# Patient Record
Sex: MALE | Race: BLACK OR AFRICAN AMERICAN | NOT HISPANIC OR LATINO | Employment: OTHER | URBAN - METROPOLITAN AREA
[De-identification: names, ages, dates, MRNs, and addresses within clinical notes are randomized per-mention and may not be internally consistent; named-entity substitution may affect disease eponyms.]

---

## 2017-03-22 ENCOUNTER — HISTORICAL (OUTPATIENT)
Dept: INTERNAL MEDICINE | Facility: CLINIC | Age: 62
End: 2017-03-22

## 2017-04-20 ENCOUNTER — HISTORICAL (OUTPATIENT)
Dept: CARDIOLOGY | Facility: CLINIC | Age: 62
End: 2017-04-20

## 2017-07-21 ENCOUNTER — HISTORICAL (OUTPATIENT)
Dept: CARDIOLOGY | Facility: CLINIC | Age: 62
End: 2017-07-21

## 2018-01-10 ENCOUNTER — HISTORICAL (OUTPATIENT)
Dept: CARDIOLOGY | Facility: CLINIC | Age: 63
End: 2018-01-10

## 2018-04-12 ENCOUNTER — HISTORICAL (OUTPATIENT)
Dept: INTERNAL MEDICINE | Facility: CLINIC | Age: 63
End: 2018-04-12

## 2018-04-12 LAB
ALBUMIN SERPL-MCNC: 3.6 GM/DL (ref 3.4–5)
ALBUMIN/GLOB SERPL: 1 RATIO (ref 1–2)
ALP SERPL-CCNC: 150 UNIT/L (ref 45–117)
ALT SERPL-CCNC: 72 UNIT/L (ref 12–78)
AST SERPL-CCNC: 28 UNIT/L (ref 15–37)
BILIRUB SERPL-MCNC: 0.7 MG/DL (ref 0.2–1)
BILIRUBIN DIRECT+TOT PNL SERPL-MCNC: 0.2 MG/DL
BILIRUBIN DIRECT+TOT PNL SERPL-MCNC: 0.5 MG/DL
BUN SERPL-MCNC: 17 MG/DL (ref 7–18)
CALCIUM SERPL-MCNC: 9.5 MG/DL (ref 8.5–10.1)
CHLORIDE SERPL-SCNC: 100 MMOL/L (ref 98–107)
CO2 SERPL-SCNC: 26 MMOL/L (ref 21–32)
CREAT SERPL-MCNC: 1.1 MG/DL (ref 0.6–1.3)
GLOBULIN SER-MCNC: 4.9 GM/ML (ref 2.3–3.5)
GLUCOSE SERPL-MCNC: 330 MG/DL (ref 74–106)
POTASSIUM SERPL-SCNC: 4 MMOL/L (ref 3.5–5.1)
PROT SERPL-MCNC: 8.5 GM/DL (ref 6.4–8.2)
SODIUM SERPL-SCNC: 135 MMOL/L (ref 136–145)

## 2018-05-21 ENCOUNTER — HISTORICAL (OUTPATIENT)
Dept: ADMINISTRATIVE | Facility: HOSPITAL | Age: 63
End: 2018-05-21

## 2018-06-12 ENCOUNTER — HISTORICAL (OUTPATIENT)
Dept: ADMINISTRATIVE | Facility: HOSPITAL | Age: 63
End: 2018-06-12

## 2018-06-25 ENCOUNTER — HISTORICAL (OUTPATIENT)
Dept: ADMINISTRATIVE | Facility: HOSPITAL | Age: 63
End: 2018-06-25

## 2018-06-25 LAB
ALBUMIN SERPL-MCNC: 3.7 GM/DL (ref 3.4–5)
ALBUMIN/GLOB SERPL: 1 RATIO (ref 1–2)
ALP SERPL-CCNC: 140 UNIT/L (ref 45–117)
ALT SERPL-CCNC: 75 UNIT/L (ref 12–78)
APTT PPP: 27.2 SECOND(S) (ref 23.3–37)
AST SERPL-CCNC: 44 UNIT/L (ref 15–37)
BILIRUB SERPL-MCNC: 1 MG/DL (ref 0.2–1)
BILIRUBIN DIRECT+TOT PNL SERPL-MCNC: 0.3 MG/DL
BILIRUBIN DIRECT+TOT PNL SERPL-MCNC: 0.7 MG/DL
BUN SERPL-MCNC: 20 MG/DL (ref 7–18)
CALCIUM SERPL-MCNC: 10 MG/DL (ref 8.5–10.1)
CHLORIDE SERPL-SCNC: 100 MMOL/L (ref 98–107)
CO2 SERPL-SCNC: 24 MMOL/L (ref 21–32)
CREAT SERPL-MCNC: 1.1 MG/DL (ref 0.6–1.3)
ERYTHROCYTE [DISTWIDTH] IN BLOOD BY AUTOMATED COUNT: 13.7 % (ref 11.5–14.5)
EST. AVERAGE GLUCOSE BLD GHB EST-MCNC: 295 MG/DL
GLOBULIN SER-MCNC: 4.7 GM/ML (ref 2.3–3.5)
GLUCOSE SERPL-MCNC: 309 MG/DL (ref 74–106)
HBA1C MFR BLD: 11.9 % (ref 4.2–6.3)
HCT VFR BLD AUTO: 47.7 % (ref 40–51)
HGB BLD-MCNC: 17.1 GM/DL (ref 13.5–17.5)
INR PPP: 1.07 (ref 0.9–1.2)
MCH RBC QN AUTO: 30.6 PG (ref 26–34)
MCHC RBC AUTO-ENTMCNC: 35.8 GM/DL (ref 31–37)
MCV RBC AUTO: 85.5 FL (ref 80–100)
PLATELET # BLD AUTO: 224 X10(3)/MCL (ref 130–400)
PMV BLD AUTO: 11.1 FL (ref 7.4–10.4)
POTASSIUM SERPL-SCNC: 4.3 MMOL/L (ref 3.5–5.1)
PROT SERPL-MCNC: 8.4 GM/DL (ref 6.4–8.2)
PROTHROMBIN TIME: 13.2 SECOND(S) (ref 11.9–14.4)
RBC # BLD AUTO: 5.58 X10(6)/MCL (ref 4.5–5.9)
SODIUM SERPL-SCNC: 133 MMOL/L (ref 136–145)
WBC # SPEC AUTO: 10.7 X10(3)/MCL (ref 4.5–11)

## 2018-06-26 ENCOUNTER — HISTORICAL (OUTPATIENT)
Dept: RADIOLOGY | Facility: HOSPITAL | Age: 63
End: 2018-06-26

## 2018-06-26 LAB
BUN SERPL-MCNC: 20 MG/DL (ref 7–18)
CREAT SERPL-MCNC: 1.2 MG/DL (ref 0.6–1.3)
EST. AVERAGE GLUCOSE BLD GHB EST-MCNC: 301 MG/DL
HBA1C MFR BLD: 12.1 % (ref 4.2–6.3)

## 2018-07-02 ENCOUNTER — HISTORICAL (OUTPATIENT)
Dept: SURGERY | Facility: HOSPITAL | Age: 63
End: 2018-07-02

## 2018-07-02 LAB — POTASSIUM SERPL-SCNC: 4 MMOL/L (ref 3.5–5.1)

## 2018-09-13 ENCOUNTER — HISTORICAL (OUTPATIENT)
Dept: INTERNAL MEDICINE | Facility: CLINIC | Age: 63
End: 2018-09-13

## 2018-09-22 ENCOUNTER — HISTORICAL (OUTPATIENT)
Dept: INTERNAL MEDICINE | Facility: CLINIC | Age: 63
End: 2018-09-22

## 2018-10-01 ENCOUNTER — HISTORICAL (OUTPATIENT)
Dept: INTERNAL MEDICINE | Facility: CLINIC | Age: 63
End: 2018-10-01

## 2018-10-31 ENCOUNTER — HISTORICAL (OUTPATIENT)
Dept: RADIOLOGY | Facility: HOSPITAL | Age: 63
End: 2018-10-31

## 2018-11-08 ENCOUNTER — HISTORICAL (OUTPATIENT)
Dept: RADIOLOGY | Facility: HOSPITAL | Age: 63
End: 2018-11-08

## 2018-11-23 ENCOUNTER — HISTORICAL (OUTPATIENT)
Dept: RADIOLOGY | Facility: HOSPITAL | Age: 63
End: 2018-11-23

## 2018-12-06 ENCOUNTER — HISTORICAL (OUTPATIENT)
Dept: RADIOLOGY | Facility: HOSPITAL | Age: 63
End: 2018-12-06

## 2019-01-11 ENCOUNTER — HISTORICAL (OUTPATIENT)
Dept: RADIOLOGY | Facility: HOSPITAL | Age: 64
End: 2019-01-11

## 2019-01-16 ENCOUNTER — HISTORICAL (OUTPATIENT)
Dept: RADIOLOGY | Facility: HOSPITAL | Age: 64
End: 2019-01-16

## 2019-04-25 ENCOUNTER — HISTORICAL (OUTPATIENT)
Dept: INTERNAL MEDICINE | Facility: CLINIC | Age: 64
End: 2019-04-25

## 2019-06-25 ENCOUNTER — HISTORICAL (OUTPATIENT)
Dept: ADMINISTRATIVE | Facility: HOSPITAL | Age: 64
End: 2019-06-25

## 2019-08-19 ENCOUNTER — HISTORICAL (OUTPATIENT)
Dept: RADIOLOGY | Facility: HOSPITAL | Age: 64
End: 2019-08-19

## 2019-08-19 ENCOUNTER — HISTORICAL (OUTPATIENT)
Dept: ADMINISTRATIVE | Facility: HOSPITAL | Age: 64
End: 2019-08-19

## 2019-10-22 ENCOUNTER — HISTORICAL (OUTPATIENT)
Dept: INTERNAL MEDICINE | Facility: CLINIC | Age: 64
End: 2019-10-22

## 2019-10-22 LAB — PSA: 0.6

## 2019-11-15 ENCOUNTER — HISTORICAL (OUTPATIENT)
Dept: INTERNAL MEDICINE | Facility: CLINIC | Age: 64
End: 2019-11-15

## 2020-01-24 ENCOUNTER — HISTORICAL (OUTPATIENT)
Dept: ADMINISTRATIVE | Facility: HOSPITAL | Age: 65
End: 2020-01-24

## 2020-04-27 ENCOUNTER — HISTORICAL (OUTPATIENT)
Dept: INTERNAL MEDICINE | Facility: CLINIC | Age: 65
End: 2020-04-27

## 2020-05-01 ENCOUNTER — HISTORICAL (OUTPATIENT)
Dept: INTERNAL MEDICINE | Facility: CLINIC | Age: 65
End: 2020-05-01

## 2020-09-02 ENCOUNTER — HISTORICAL (OUTPATIENT)
Dept: INTERNAL MEDICINE | Facility: CLINIC | Age: 65
End: 2020-09-02

## 2020-09-02 LAB — HIV 1+2 AB+HIV1 P24 AG SERPL QL IA: NONREACTIVE

## 2020-10-12 ENCOUNTER — HISTORICAL (OUTPATIENT)
Dept: INTERNAL MEDICINE | Facility: CLINIC | Age: 65
End: 2020-10-12

## 2020-11-11 ENCOUNTER — HISTORICAL (OUTPATIENT)
Dept: INTERNAL MEDICINE | Facility: CLINIC | Age: 65
End: 2020-11-11

## 2020-11-12 ENCOUNTER — HISTORICAL (OUTPATIENT)
Dept: ADMINISTRATIVE | Facility: HOSPITAL | Age: 65
End: 2020-11-12

## 2020-11-25 ENCOUNTER — HISTORICAL (OUTPATIENT)
Dept: RESPIRATORY THERAPY | Facility: HOSPITAL | Age: 65
End: 2020-11-25

## 2020-12-23 ENCOUNTER — HISTORICAL (OUTPATIENT)
Dept: INTERNAL MEDICINE | Facility: CLINIC | Age: 65
End: 2020-12-23

## 2021-02-01 ENCOUNTER — HISTORICAL (OUTPATIENT)
Dept: INTERNAL MEDICINE | Facility: CLINIC | Age: 66
End: 2021-02-01

## 2021-03-17 ENCOUNTER — HISTORICAL (OUTPATIENT)
Dept: INTERNAL MEDICINE | Facility: CLINIC | Age: 66
End: 2021-03-17

## 2021-06-15 ENCOUNTER — HISTORICAL (OUTPATIENT)
Dept: ADMINISTRATIVE | Facility: HOSPITAL | Age: 66
End: 2021-06-15

## 2021-10-18 ENCOUNTER — HISTORICAL (OUTPATIENT)
Dept: INTERNAL MEDICINE | Facility: CLINIC | Age: 66
End: 2021-10-18

## 2021-10-18 LAB — HCV AB SERPL QL IA: NEGATIVE

## 2021-10-29 ENCOUNTER — HISTORICAL (OUTPATIENT)
Dept: ADMINISTRATIVE | Facility: HOSPITAL | Age: 66
End: 2021-10-29

## 2021-11-02 LAB — HEMOCCULT STL QL IA: NEGATIVE

## 2022-01-03 ENCOUNTER — HISTORICAL (OUTPATIENT)
Dept: GASTROENTEROLOGY | Facility: CLINIC | Age: 67
End: 2022-01-03

## 2022-04-07 ENCOUNTER — HISTORICAL (OUTPATIENT)
Dept: ADMINISTRATIVE | Facility: HOSPITAL | Age: 67
End: 2022-04-07
Payer: MEDICARE

## 2022-04-24 VITALS
HEIGHT: 70 IN | SYSTOLIC BLOOD PRESSURE: 131 MMHG | BODY MASS INDEX: 37.53 KG/M2 | DIASTOLIC BLOOD PRESSURE: 77 MMHG | WEIGHT: 262.13 LBS | OXYGEN SATURATION: 98 %

## 2022-04-26 DIAGNOSIS — K76.0 HEPATIC STEATOSIS: Primary | ICD-10-CM

## 2022-05-02 NOTE — HISTORICAL [facility] CERNER
This is a historical note converted from [employer]. Formatting and pictures may have been removed.  Please reference [employer] for original formatting and attached multimedia. Chief Complaint  referred for hearing loss  History of Present Illness  63yM referred for hearing loss, also has issues with cerumen.  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  Hearing improves on the right with auto-insufflation  He is a current smoker. No neck masses  ?   7/2/18: Pt here for panendoscopy. No changes to health.? Has cardiology clearance.  ?   Review of Systems  Constitutional_negative  Eye_no vision changes  ENMT see HPI  Respiratory negative  Cardiovascular negative  Gastrointestinal negative  Hema/Lymph_negative  Endocrine negative  Immunologic negative  Musculoskeletal negative  Integumentary negative  Neurologic negative  All Other ROS_ negative  Physical Exam  ???Vitals & Measurements  ??T:?36.9? ?C (Oral)? HR:?78(Peripheral)? RR:?20? BP:?123/85? SpO2:?98%?  ??HT:?175.26?cm? WT:?112.4?kg?  General_NAD, normocephalic and atraumatic, normal voice  Eye_EOMI, PERRL  Ear Right TM With ? effusionl, Left TM normal, normal pinnas and EACs  Nose - Inferior turbinates normal, septum midline, mucosa moist  Oral cavity - Tongue normal, no mucosal lesions, good dentition  Oropharynx - no lesions noted  Neck- no lymphadenopathy, no thyromegaly  Respiratory- no increased work of breathing  Integumentary- no rashes, no skin lesions  Neurologic- CN II-XII intact  ?   Audio: Bilateral CHL, TYye As tymp on the right, no tymp done on the left  ?   Laryngoscopy - The patient was anesthetized with topical tetracaine and neosynephrine. The laryngoscope was passed through the nostril easily. The Nasopharynx was normal. The Base of tongue was normal. The supraglottis was normal. The piriforms were normal. The vocal cords were mobile bilaterally with no mucosal lesions. the tonsils are both large, however the left is  significantly larger than the right and extends to the level of the epiglottis. Airway is widely patent  Assessment/Plan  1.?Hearing loss  ??? ? effussion on the right?  ??CT IAC  Flonase BID  ?  2.?Impacted cerumen  ??? Cleared today  ?  FFL-concerning Left tonsil lesion, firm. Extends to the level of the epiglottis but non-obstructive, airway is widely patent  ?  CT neck with contrast-done  unable to hold plavix per cardiology due to drug eluting stent  To OR July 2nd for panendoscopy.  No changes to above H&P  RTC after surgery  ?  ? Problem List/Past Medical History  ??Ongoing  ??Anemia  ??CAD (coronary artery disease)  ??Diabetes  ??HLD (hyperlipidemia)  ??HTN (hypertension)  ??Syncope  ??Tobacco user  ??Tobacco user  ??Tobacco user  ?Historical  ??Acid reflux  Procedure/Surgical History  Dilation of Coronary Artery, One Artery with Drug-eluting Intraluminal Device, Percutaneous Approach (09/20/2017), Fluoroscopy of Left Heart using Low Osmolar Contrast (09/20/2017), Fluoroscopy of Multiple Coronary Arteries using Low Osmolar Contrast (09/20/2017), Measurement of Cardiac Sampling and Pressure, Left Heart, Percutaneous Approach (09/20/2017), Dilation of Coronary Artery, One Site with Drug-eluting Intraluminal Device, Percutaneous Approach (02/05/2016), Fluoroscopy of Left Heart using Low Osmolar Contrast (02/05/2016), Fluoroscopy of Multiple Coronary Arteries using Low Osmolar Contrast (02/05/2016), Measurement of Cardiac Sampling and Pressure, Left Heart, Percutaneous Approach (02/05/2016), Insertion of coronary artery stent, Insertion of coronary artery stent.  Medications  ??aspirin 81 mg oral Delayed Release (EC) tablet, 81 mg= 1 tab(s), Oral, Daily, 3 refills  ??atorvastatin 80 mg oral tablet, 80 mg= 1 tab(s), Oral, Daily, 2 refills  ??clopidogrel 75 mg oral tablet, 75 mg= 1 tab(s), Oral, Daily, 3 refills  ??glipiZIDE 10 mg oral tablet, 10 mg= 1 tab(s), Oral, BID, 6 refills  ??Glucometer, See Instructions,  1 refills  ??Glucometer Test Strips and Lancets, See Instructions, 3 refills  ??lisinopril 10 mg oral tablet, 10 mg= 1 tab(s), Oral, Daily, 2 refills  ??metFORMIN 1000 mg oral tablet, 1000 mg= 1 tab(s), Oral, BID, 6 refills  ??metoprolol tartrate 50 mg oral tab, 50 mg= 1 tab(s), Oral, BID, 2 refills  ??nitroglycerin 0.4 mg sublingual TAB, 0.4 mg= 1 tab(s), SL, q5min, PRN  ??omeprazole 40 mg oral DR capsule, 40 mg= 1 cap(s), Oral, Daily, 6 refills  ??ONE TOUCH ULTRA BLUE VERIO, See Instructions, 3 refills  ??ONE TOUCH ULTRA BLUE VERIO, See Instructions  Allergies  No Known Medication Allergies  Social History  ??Alcohol - Medium Risk, 02/04/2016  ???Current, Beer, 1-2 times per month, 06/12/2018  ???Current, Beer, 1-2 times per week, 02/04/2016  ??Employment/School  ???Retired, Operates hazardous equipment: No., 06/21/2016  ??Exercise  ???Self assessment: Fair condition., 06/21/2016  ??Home/Environment  ???Lives with Children. Living situation: Home/Independent. Alcohol abuse in household: No. Substance abuse in household: No. Smoker in household: Yes. Injuries/Abuse/Neglect in household: No. Feels unsafe at home: No. Family/Friends available for support: Yes. Concern for family members at home: No. Major illness in household: No. Financial concerns: No. TV/Computer concerns: No., 06/21/2016  ??Nutrition/Health  ???Regular, 03/01/2016  ??Substance Abuse - Denies Substance Abuse, 02/04/2016  ???Past, 03/01/2016  ??Tobacco - High Risk, 02/04/2016  ???Current every day smoker Use:. Cigarettes Type:. 15 per day. Started age 6 Years., 06/12/2018  ???Current every day smoker, 10 per day., 08/21/2017  ???Current every day smoker, Cigarettes, 03/20/2017  Family History  ??Heart failure.: Mother.  ??Hypertension.: Mother.  ??Renal failure syndrome.: Sister.  Immunizations  ?Vaccine ?Date ?Status   ?influenza virus vaccine, inactivated 12/01/2016 Given   ?pneumococcal 23-polyvalent vaccine 02/06/2016 Given   ?influenza virus  vaccine, inactivated 02/06/2016 Given   ?  Health Maintenance  Health Maintenance  ???Pending?(in the next year)  ??? ??OverDue  ??? ? ? ?Diabetes Maintenance-Urine Dipstick due??08/30/17??and every 1??year(s)  ??? ? ? ?Diabetes Maintenance-HgbA1c due??09/22/17??and every 6??month(s)  ??? ??Due?  ??? ? ? ?Alcohol Misuse Screening due??06/12/18??and every 1??year(s)  ??? ? ? ?Colorectal Screening due??06/12/18??Variable frequency  ??? ? ? ?Diabetes Maintenance-Eye Exam due??06/12/18??Variable frequency  ??? ? ? ?Diabetes Maintenance-Microalbumin due??06/12/18??Variable frequency  ??? ? ? ?Diabetes Maintenance-Foot Exam due??06/12/18??Variable frequency  ??? ? ? ?Hypertension Management-Education due??06/12/18??and every 1??year(s)  ??? ? ? ?Tetanus Vaccine due??06/12/18??and every 10??year(s)  ??? ? ? ?Zoster Vaccine due??06/12/18??and every 100??year(s)  ??? ??Due In Future?  ??? ? ? ?Influenza Vaccine not due until??10/02/18??and every 1??year(s)  ??? ? ? ?Hypertension Management-Blood Pressure not due until??12/12/18??and every 6??month(s)  ??? ? ? ?Diabetes Maintenance-Fasting Lipid Profile not due until??01/10/19??and every 1??year(s)  ??? ? ? ?Lipid Screening not due until??01/10/19??and every 1??year(s)  ??? ? ? ?Diabetes Maintenance-Serum Creatinine not due until??04/12/19??and every 1??year(s)  ??? ? ? ?Hypertension Management-BMP not due until??04/12/19??and every 1??year(s)  ??? ? ? ?Body Mass Index Check not due until??04/12/19??and every 1??year(s)  ??? ? ? ?Obesity Screening not due until??04/12/19??and every 1??year(s)  ??? ? ? ?Aspirin Therapy for CVD Prevention not due until??04/12/19??and every 1??year(s)  ???Satisfied?(in the past 1 year)  ??? ??Satisfied?  ??? ? ? ?Aspirin Therapy for CVD Prevention on??04/12/18.??Satisfied by Imtiaz Solano DO  ??? ? ? ?Blood Pressure Screening on??06/12/18.??Satisfied by Shayla Goldstein LPN  ??? ? ? ?Body Mass Index Check on??04/12/18.??Satisfied by  Cammy Freeman RN  ??? ? ? ?Depression Screening on??06/12/18.??Satisfied by Shayla Goldstein LPN  ??? ? ? ?Diabetes Maintenance-Fasting Lipid Profile on??01/10/18.??Satisfied by Sree Swanson Jr.  ??? ? ? ?Diabetes Maintenance-HgbA1c on??09/21/17.??Satisfied by Dora Judd  ??? ? ? ?Diabetes Maintenance-Serum Creatinine on??04/12/18.??Satisfied by Hannah Hopson  ??? ? ? ?Hypertension Management-BMP on??04/12/18.??Satisfied by Hannah Hopson  ??? ? ? ?Hypertension Management-Blood Pressure on??06/12/18.??Satisfied by Shayla Goldstein LPN  ??? ? ? ?Lipid Screening on??01/10/18.??Satisfied by Sree Swanson Jr.  ??? ? ? ?Obesity Screening on??04/12/18.??Satisfied by aCmmy Freeman RN  ??? ? ? ?Smoking Cessation on??06/12/18.??Satisfied by Shayla Goldstein LPN  ??? ? ? ?Tobacco Use Screening on??06/12/18.??Satisfied by Shayla Goldstein LPN  ?

## 2022-05-02 NOTE — HISTORICAL [facility] CERNER
This is a historical note converted from [employer]. Formatting and pictures may have been removed.  Please reference [employer] for original formatting and attached multimedia. Indication for Surgery  63yM with asymmetrical tonsils on exam L>R and CT with concern for tonsillar mass and a smoking history.? Pt also has a significant cardiac history and obtained cardiac clearance before the procedure.? However, due to recent drug eluting stent placement pt was unable to hold plavix.? This was discussed with patient and due to this plavix usage?a tonsillectomy would not be safe to perform at this time but a biopsy of the tonsil would still be done.? We also discussed this may not yield the results we need and could complete a tonsillectomy once plavix could be stopped.  Preoperative Diagnosis  tonsillar mass  Postoperative Diagnosis  tonsillar mass  Operation  direct laryngoscopy and biopsy, bronchoscopy, esophagoscopy  Surgeon(s)  Myrna Palencia MD (Resident)  Sari Maravilla MD (Resident)  Page Ferro MD (Resident)  James Alvarez MD (Staff)  Assistant  none  Anesthesia  General endotracheal anesthesia  Estimated Blood Loss  5mL  Urine Output  see anesthesia report  Findings  left greater than right tonsil, soft, no particular area more suspicious looking.  Specimen(s)  left tonsil  Complications  none  Technique  After proper informed consent was obtained, the patient was brought to the operating room and placed supine on the operating table. General endotracheal anesthesia was administered and the bed was turned 90 degrees.? The patient was draped and proper time-out was completed including correct patient, procedure, and site.? The patients head was covered and the maxillary dentition on the right was noted to be loose and there were missing teeth on the left maxilla.? Bilateral tonsils were palpated and?left tonsil was?enlarged compared to right and felt soft and nodular bilaterally.? A tooth guard was placed  and a wet gauze, then a Dedo laryngoscope was introduced to examine the oropharynx, hypopharynx, and larynx.? No particular areas of concern was noted.? The Dedo was suspended and a bronchoscope was passed through the trachea.? Lots of thick clear mucus was suctioned from the trachea/bronchi.? No lesions/masses were noted in the trachea or the subsegmental bronchi.? The Dedo laryngoscope was removed and tooth number 8 was noted to be looser.? A 4 anna madeleine with tooth guarde?was used to gently open the oral cavity just enough to visualize the left tonsil without putting strain on maxillary dentition.? This was difficult?due to the absence of dentition on the left and presence of dentition of the right.? A red rubber was utilized to retract the uvula.?? Using a cup biopsy forceps the left tonsil was biopsied and hemostasis was achieved with afrin soaked pledgets.? The anna madeleine and tooth guard were removed and an esophagoscope was passed.? The stomach, pyloris, and lower esophageal sphincter were visualized and no masses/lesions were noted.? Upon removal of the scope the esophagus was visualized and no masses/lesions were noted.? The tooth guard was placed and the Dedo was used to visualize the left tonsil and an additional biopsy was taken.? Again hemostasis was achieved with afrin soaked pledgets.? The tooth guard and dedo were removed.?Hemostasis was cofirmed.? Pt was turned back to anesthesia, awakened, and taken to PACU in stable condition. All counts were correct. Dr. Alvarez was readily available throughout the procedure.  ?  Sari Maravilla MD  Otolaryngology Resident [facility]

## 2022-05-10 ENCOUNTER — TELEPHONE (OUTPATIENT)
Dept: INTERNAL MEDICINE | Facility: CLINIC | Age: 67
End: 2022-05-10
Payer: MEDICARE

## 2022-05-10 DIAGNOSIS — I25.10 CORONARY ARTERY DISEASE, UNSPECIFIED VESSEL OR LESION TYPE, UNSPECIFIED WHETHER ANGINA PRESENT, UNSPECIFIED WHETHER NATIVE OR TRANSPLANTED HEART: Primary | ICD-10-CM

## 2022-05-10 NOTE — TELEPHONE ENCOUNTER
Per chart review, I do not see why patient should not be taking. He also has not had follow up with Cardiologist in quite some time and I have discussed with him importance of this. He needs to contact Cardio for appt; please provide him with contact info.   Thanks

## 2022-05-10 NOTE — TELEPHONE ENCOUNTER
----- Message from Nessa Raygoza LPN sent at 5/10/2022  1:24 PM CDT -----  Regarding: Rx. concerns  Placed call to [facility] and spoke Agnes,  and stated Sarah, home health nurse was calling to clarify if patient still needs to continue taking Plavix rx. Please review and advise  ----- Message -----  From: Viri Montanez MA  Sent: 5/10/2022  10:02 AM CDT  To: , #      ----- Message -----  From: Yoana Juarez  Sent: 5/6/2022  10:03 AM CDT  To: , [facility] and left message that she would like a call back.  She did not leave any other message other than the patients name and b/o/b.  Thank you, yoana

## 2022-05-11 RX ORDER — CLOPIDOGREL BISULFATE 75 MG/1
75 TABLET ORAL DAILY
Qty: 90 TABLET | Refills: 0 | Status: SHIPPED | OUTPATIENT
Start: 2022-05-11 | End: 2022-08-22 | Stop reason: SDUPTHER

## 2022-05-11 NOTE — TELEPHONE ENCOUNTER
Received a call back from Sarah at  [facility] , she stated she understands what Cammy is saying. She did speak to patient's daughter and she is supposed to be calling [facility]iology [facility] to schedule him an appointment. Sarah stated she called [facility]io [facility] to see if they could fill Mr. Addison's plavix and was informed they can not. Since the last time he was seen was 2020. She is asking if Cammy can send script to [facility] pharmacy .

## 2022-05-20 ENCOUNTER — TELEPHONE (OUTPATIENT)
Dept: INTERNAL MEDICINE | Facility: CLINIC | Age: 67
End: 2022-05-20
Payer: MEDICARE

## 2022-05-20 DIAGNOSIS — I25.10 CORONARY ARTERY DISEASE, UNSPECIFIED VESSEL OR LESION TYPE, UNSPECIFIED WHETHER ANGINA PRESENT, UNSPECIFIED WHETHER NATIVE OR TRANSPLANTED HEART: Primary | ICD-10-CM

## 2022-05-20 NOTE — TELEPHONE ENCOUNTER
Taken care of 5/10/22        ----- Message from Yoana Juarez sent at 5/6/2022 10:01 AM CDT -----  [facility]  called and left message that she would like a call back.  She did not leave any other message other than the patients name and b/o/b.  Thank you, yoana

## 2022-06-13 ENCOUNTER — OFFICE VISIT (OUTPATIENT)
Dept: CARDIOLOGY | Facility: CLINIC | Age: 67
End: 2022-06-13
Payer: MEDICARE

## 2022-06-13 VITALS
BODY MASS INDEX: 36.83 KG/M2 | HEIGHT: 70 IN | SYSTOLIC BLOOD PRESSURE: 130 MMHG | HEART RATE: 89 BPM | TEMPERATURE: 98 F | RESPIRATION RATE: 20 BRPM | DIASTOLIC BLOOD PRESSURE: 77 MMHG | WEIGHT: 257.25 LBS | OXYGEN SATURATION: 99 %

## 2022-06-13 DIAGNOSIS — I25.10 CORONARY ARTERY DISEASE, UNSPECIFIED VESSEL OR LESION TYPE, UNSPECIFIED WHETHER ANGINA PRESENT, UNSPECIFIED WHETHER NATIVE OR TRANSPLANTED HEART: Primary | ICD-10-CM

## 2022-06-13 DIAGNOSIS — E11.40 TYPE 2 DIABETES MELLITUS WITH DIABETIC NEUROPATHY, WITHOUT LONG-TERM CURRENT USE OF INSULIN: ICD-10-CM

## 2022-06-13 DIAGNOSIS — F17.200 SMOKING: ICD-10-CM

## 2022-06-13 DIAGNOSIS — M79.605 PAIN IN BOTH LOWER EXTREMITIES: ICD-10-CM

## 2022-06-13 DIAGNOSIS — M79.604 PAIN IN BOTH LOWER EXTREMITIES: ICD-10-CM

## 2022-06-13 DIAGNOSIS — E78.5 HYPERLIPIDEMIA LDL GOAL <70: ICD-10-CM

## 2022-06-13 DIAGNOSIS — I10 HYPERTENSION, UNSPECIFIED TYPE: ICD-10-CM

## 2022-06-13 PROCEDURE — 93005 ELECTROCARDIOGRAM TRACING: CPT

## 2022-06-13 PROCEDURE — 99214 OFFICE O/P EST MOD 30 MIN: CPT | Mod: PBBFAC | Performed by: INTERNAL MEDICINE

## 2022-06-13 RX ORDER — FLUTICASONE PROPIONATE 50 MCG
1 SPRAY, SUSPENSION (ML) NASAL
Status: ON HOLD | COMMUNITY
Start: 2022-02-09 | End: 2023-06-14 | Stop reason: HOSPADM

## 2022-06-13 RX ORDER — GABAPENTIN 100 MG/1
100 CAPSULE ORAL 3 TIMES DAILY
COMMUNITY
Start: 2022-05-18 | End: 2022-08-22

## 2022-06-13 RX ORDER — PIOGLITAZONEHYDROCHLORIDE 30 MG/1
30 TABLET ORAL DAILY
COMMUNITY
Start: 2022-03-08 | End: 2022-09-28

## 2022-06-13 RX ORDER — FAMOTIDINE 40 MG/1
40 TABLET, FILM COATED ORAL 2 TIMES DAILY
COMMUNITY
Start: 2022-05-18 | End: 2022-08-04 | Stop reason: SDUPTHER

## 2022-06-13 RX ORDER — LINAGLIPTIN 5 MG/1
5 TABLET, FILM COATED ORAL DAILY
COMMUNITY
Start: 2022-02-09 | End: 2022-06-24

## 2022-06-13 RX ORDER — METFORMIN HYDROCHLORIDE 1000 MG/1
1000 TABLET ORAL 2 TIMES DAILY
COMMUNITY
Start: 2022-02-09 | End: 2023-03-21

## 2022-06-13 RX ORDER — FLUTICASONE PROPIONATE AND SALMETEROL XINAFOATE 230; 21 UG/1; UG/1
1 AEROSOL, METERED RESPIRATORY (INHALATION)
COMMUNITY
End: 2022-08-22 | Stop reason: SDUPTHER

## 2022-06-13 RX ORDER — LISINOPRIL 10 MG/1
10 TABLET ORAL DAILY
COMMUNITY
Start: 2022-02-09 | End: 2022-08-22 | Stop reason: SDUPTHER

## 2022-06-13 RX ORDER — METOPROLOL TARTRATE 50 MG/1
50 TABLET ORAL 2 TIMES DAILY
COMMUNITY
Start: 2022-02-09 | End: 2022-08-22 | Stop reason: SDUPTHER

## 2022-06-13 RX ORDER — ATORVASTATIN CALCIUM 80 MG/1
80 TABLET, FILM COATED ORAL DAILY
COMMUNITY
Start: 2022-02-09 | End: 2022-08-22 | Stop reason: SDUPTHER

## 2022-06-13 RX ORDER — NAPROXEN SODIUM 220 MG/1
81 TABLET, FILM COATED ORAL DAILY
Status: ON HOLD | COMMUNITY
End: 2023-07-05 | Stop reason: SDUPTHER

## 2022-06-13 RX ORDER — CANAGLIFLOZIN 100 MG/1
100 TABLET, FILM COATED ORAL DAILY
COMMUNITY
Start: 2022-02-08 | End: 2022-06-24

## 2022-06-13 RX ORDER — OMEPRAZOLE 40 MG/1
40 CAPSULE, DELAYED RELEASE ORAL DAILY
COMMUNITY
Start: 2022-02-09 | End: 2022-08-22

## 2022-06-13 RX ORDER — ALBUTEROL SULFATE 90 UG/1
2 AEROSOL, METERED RESPIRATORY (INHALATION)
COMMUNITY
Start: 2022-02-09 | End: 2022-08-22 | Stop reason: SDUPTHER

## 2022-06-13 NOTE — PROGRESS NOTES
"Chief Complaint   Patient presents with    f/u last seen 2 years ago denies chest pain has sob     HPI  This is a 67-year-old male with a past medical history of CAD/NSTEMI 2/2016 s/p PCI of left circumflex, inferior wall STEMI s/p PTCA/BHAKTI of distal RCA in 9/2017, hypertension, hypercholesterolemia, diabetes, and chronic tobacco use presents to reestablish cardiovascular care. Last visit was 4/27/2020.   Today, he reports feeling well from a cardiovascular standpoint. He denies any chest pain but reports dyspnea on exertion when trying to do things fast. No limitation in ADLs but tries to do things slowly. He continues to smoke and has been cutting down, still smokes between 0.5-1 pack a day. Also occasionally drinks alcohol and smokes marijuana to "calm his nerves". He continues to have leg pain that he describes as 5 second sharp pain at rest. Also reports feeling "legs on fire" after walking.  No bleeding on aspirin.    Cardiac testing  Lexiscan stress test October 2018:  ECG portion of stress test is negative for ischemia by diagnostic criteria.  Negative for ischemia. Normal myocardial perfusion study on stress imaging. Normal LVEF 57%.  Recommendation:  Recommend medical management of coronary disease if clinically indicated.    SANIA testing November 2018:  Normal resting SANIA  Normal stress Doppler-study limited by patient's complaint of shortness of breath    Echocardiogram September 20, 2017:  Ejection fraction is visually estimated at 55%  Mild mitral regurgitation  No evidence of pericardial effusion    ROS  Constitutional: negative for fever,chills, sweats, weakness, fatigue, decreased activity   Eye: negative for blurry vision, vision change  ENMT: negative for sore throat, nasal congestion  Respiratory: negative for shortness of breath at rest, cough, wheezing  Cardiovascular:  dyspnea on exertion, negative for chest pain, orthopnea, PND, lower extremity edema, palpitations, " claudication  Gastrointestinal: negative for nausea, vomiting, abdominal pain, constipation, diarrhea, blood in stool  Genitourinary: negative for hematuria, nocturia, dysuria  Endocrine: negative for abnormal thirst, temperature  Musculoskeletal: negative for back pain, joint pain  Integumentary: negative for abnormal mole  Neurologic: negative for weakness, numbness, headaches, shooting pain  Hematology: negative for easy bruising, easy bleeding  Allergy: negative for watery eyes, sneezing  Psychiatric: negative for loss of interest, anxiety, stress    Physical Exam Vitals & Measurements    Vitals:    06/13/22 0906   BP: 130/77   Pulse: 89   Resp: 20   Temp: 98.4 °F (36.9 °C)         General: alert and oriented/no acute distress  Eye: EOMI/normal conjunctiva  HENT: normocephalic/moist oral mucosa  Neck: supple/nontender/no carotid bruit/no JVD  Respiratory: lungs CTA/nonlabored respirations/BS equal/symmetrical expansion/no chest wall tenderness  Cardiovascular: normal rate/normal rhythm/no murmur/normal peripheral perfusion/no edema  Gastrointestinal: soft/nontender/nondistended  Musculoskeletal: normal ROM/normal strength  Integumentary: warm/dry/pink/intact  Neurologic: alert/oriented/normal sensory/no focal deficits  Psychiatric: cooperative/appropriate mood and affect/normal judgment       Current Outpatient Medications:     albuterol (PROVENTIL/VENTOLIN HFA) 90 mcg/actuation inhaler, Inhale 2 puffs into the lungs as needed., Disp: , Rfl:     aspirin 81 MG Chew, Take 81 mg by mouth once daily., Disp: , Rfl:     atorvastatin (LIPITOR) 80 MG tablet, Take 80 mg by mouth once daily., Disp: , Rfl:     clopidogreL (PLAVIX) 75 mg tablet, Take 1 tablet (75 mg total) by mouth once daily., Disp: 90 tablet, Rfl: 0    famotidine (PEPCID) 40 MG tablet, Take 40 mg by mouth 2 (two) times daily., Disp: , Rfl:     fluticasone propionate (FLONASE) 50 mcg/actuation nasal spray, 1 spray by Each Nostril route as needed.,  Disp: , Rfl:     fluticasone-salmeterol 230-21 mcg/dose (ADVAIR HFA) 230-21 mcg/actuation HFAA inhaler, Inhale 1 puff into the lungs as needed. Controller, Disp: , Rfl:     gabapentin (NEURONTIN) 100 MG capsule, Take 100 mg by mouth 3 (three) times daily., Disp: , Rfl:     INVOKANA 100 mg Tab tablet, Take 100 mg by mouth once daily., Disp: , Rfl:     lisinopriL 10 MG tablet, Take 10 mg by mouth once daily., Disp: , Rfl:     metFORMIN (GLUCOPHAGE) 1000 MG tablet, Take 1,000 mg by mouth 2 (two) times daily., Disp: , Rfl:     metoprolol tartrate (LOPRESSOR) 50 MG tablet, Take 50 mg by mouth 2 (two) times daily., Disp: , Rfl:     omeprazole (PRILOSEC) 40 MG capsule, Take 40 mg by mouth once daily., Disp: , Rfl:     pioglitazone (ACTOS) 30 MG tablet, Take 30 mg by mouth once daily., Disp: , Rfl:     TRADJENTA 5 mg Tab tablet, Take 5 mg by mouth once daily., Disp: , Rfl:       Assessment/Plan  CAD (coronary artery disease)  NSTEMI s/p PCI left circumflex in 2/2016  STEMI s/p PTCA/BHAKTI distal RCA on 9/2017   Lexiscan Stress Test October 2018 - negative for ischemia  Denies any CP, heaviness, tightness, dizziness, and syncope. Reports stable BRYAN  Continue baby aspirin, plavix, lisinopril, metoprolol, and atorvastatin   Counseled on lifestyle modifications with diet, exercise, and smoking cessation      Left leg pain  Reports intermittent pain with ambulation. Also has sharp shooting pain at rest that appears to be related to neuropathy  Normal resting SANIA 2018  Repeat SANIA      HTN (hypertension)  Controlled 130/77  continue current medical management  Counseled on a low sodium, low cholesterol diet    HLD (hyperlipidemia)  LDL at goal - 50 2/2022  Continue Atorvastatin 80mg by mouth daily  Counseled patient on low-cholesterol, low-fat diet, and encourage exercise as tolerated.      Diabetes  Uncontrolled - last A1c 10.2 -(labs in Feb 2022)  Medication adjustments made recently  Stressed the importance of good  diabetes control with strict ADA, low cholesterol diet  Management per PCP    Tobacco Abuse  Continues to smoke approximately half a pack to one pack a day. Patient reports he is not ready to quit at this time but will try to cut down further  Counseled on smoking cessation and will revisit at next visit.    Obesity  Discussed and emphasized the importance of weight loss for overall health including cardiovascular health and musculoskeletal health. Discussed trying to lose weight by more portion control and and only eating high-fat foods occasionally. Discussed the importance of slow weight gain in order to maintain the weight loss. Explained how diabetes, hypertension, hyperlipidemia, joint and back pain all improve with weight loss. Also encouraged patient to stay as active as possible.        SANIA   Follow up in Cardiology Clinic in 4 months  Follow up with PCP as directed

## 2022-06-20 ENCOUNTER — PROCEDURE VISIT (OUTPATIENT)
Dept: INFECTIOUS DISEASES | Facility: CLINIC | Age: 67
End: 2022-06-20
Payer: MEDICARE

## 2022-06-20 DIAGNOSIS — K76.0 HEPATIC STEATOSIS: Primary | ICD-10-CM

## 2022-06-20 DIAGNOSIS — K76.0 NAFLD (NONALCOHOLIC FATTY LIVER DISEASE): ICD-10-CM

## 2022-06-20 PROCEDURE — 99212 OFFICE O/P EST SF 10 MIN: CPT | Mod: PBBFAC

## 2022-06-20 PROCEDURE — 91200 LIVER ELASTOGRAPHY: CPT | Mod: PBBFAC | Performed by: INTERNAL MEDICINE

## 2022-06-20 NOTE — PROGRESS NOTES
Patient here for FibroScan visit. Reports NPO X3 hours and denies any implanted electronic devices. Position patient for the procedure to expose the right rib cage. Explained procedure to the patient. FibroScan exam complete, was tolerated without any problems.

## 2022-06-24 ENCOUNTER — OFFICE VISIT (OUTPATIENT)
Dept: ENDOCRINOLOGY | Facility: CLINIC | Age: 67
End: 2022-06-24
Payer: MEDICARE

## 2022-06-24 VITALS
WEIGHT: 255.94 LBS | HEART RATE: 112 BPM | TEMPERATURE: 99 F | RESPIRATION RATE: 16 BRPM | DIASTOLIC BLOOD PRESSURE: 79 MMHG | HEIGHT: 71 IN | BODY MASS INDEX: 35.83 KG/M2 | SYSTOLIC BLOOD PRESSURE: 136 MMHG

## 2022-06-24 DIAGNOSIS — R00.0 TACHYCARDIA: ICD-10-CM

## 2022-06-24 LAB — HBA1C MFR BLD: 9.2 %

## 2022-06-24 PROCEDURE — 83036 HEMOGLOBIN GLYCOSYLATED A1C: CPT | Mod: PBBFAC

## 2022-06-24 PROCEDURE — 99214 OFFICE O/P EST MOD 30 MIN: CPT | Mod: PBBFAC

## 2022-06-24 RX ORDER — DULAGLUTIDE 0.75 MG/.5ML
INJECTION, SOLUTION SUBCUTANEOUS
COMMUNITY
Start: 2022-06-08 | End: 2022-06-24

## 2022-06-24 RX ORDER — LANCETS 33 GAUGE
EACH MISCELLANEOUS
Status: ON HOLD | COMMUNITY
Start: 2022-02-09 | End: 2023-06-14 | Stop reason: HOSPADM

## 2022-06-24 RX ORDER — INSULIN DEGLUDEC 200 U/ML
44 INJECTION, SOLUTION SUBCUTANEOUS DAILY
Qty: 7 PEN | Refills: 1 | Status: SHIPPED | OUTPATIENT
Start: 2022-06-24 | End: 2022-09-28

## 2022-06-24 RX ORDER — DULAGLUTIDE 1.5 MG/.5ML
1.5 INJECTION, SOLUTION SUBCUTANEOUS
Qty: 4 PEN | Refills: 5 | Status: SHIPPED | OUTPATIENT
Start: 2022-06-24 | End: 2022-09-28

## 2022-06-24 RX ORDER — GLIPIZIDE 10 MG/1
10 TABLET ORAL 2 TIMES DAILY
Status: ON HOLD | COMMUNITY
Start: 2022-02-09 | End: 2023-06-14 | Stop reason: HOSPADM

## 2022-06-24 RX ORDER — BLOOD-GLUCOSE METER
EACH MISCELLANEOUS
Status: ON HOLD | COMMUNITY
Start: 2022-01-04 | End: 2023-05-16 | Stop reason: HOSPADM

## 2022-06-24 RX ORDER — INSULIN GLARGINE 100 [IU]/ML
36 INJECTION, SOLUTION SUBCUTANEOUS
COMMUNITY
Start: 2022-03-08 | End: 2022-06-24

## 2022-06-24 RX ORDER — INSULIN DEGLUDEC 200 U/ML
INJECTION, SOLUTION SUBCUTANEOUS
COMMUNITY
Start: 2022-03-08 | End: 2022-06-24

## 2022-06-24 NOTE — PROGRESS NOTES
[facility]    Subjective:        Mr Addison is a 67 year old gentleman with PMH of HTN, HLD, CAD (s/p PCI x 2 with LCx & RCA stents in 2016/2017), Prior STEMI, DM Type 2 who presented to the [facility] for a routine follow up. He presents to clinic today for follow-up of chronic medical conditions.  Patient has not been compliant with taking his blood glucose levels because he is scared of needles.  Patient gets his blood glucose checked when his home health company comes once a week on Mondays.  Patient says that with regards to his insulin injections that most the day sees unable to get injections twice a day however he is able to get injections once a day when his daughter comes.  Patient says he has not been as compliant as he should on his diet, patient says that he drinks soda is occasionally, and that he eats bread and rice.  Patient denies any chest pain, palpitation, shortness of breath.  He does have dyspnea on exertion which has been chronic.  Patient currently smokes half a pack cigarettes a day which has been cut down from his 2-3 packs cigarettes in the past.  Patient denies any lower extremity edema.  Denies any constipation or diarrhea.    Review of Systems:  10 point ROS negative except for HPI    Objective:   Vital Signs:  Vitals:    06/24/22 0853   BP: 136/79   Pulse: (!) 112   Resp: 16   Temp: 98.6 °F (37 °C)        Body mass index is 35.83 kg/m².     General:  Well developed, well nourished, no acute respiratory distress  Head: Normocephalic, atraumatic  Eyes: PERRL, EOMI, anicteric sclera  Throat: No posterior pharyngeal erythema or exudate, no tonsillar exudate  Neck: supple, normal ROM, no JVD  CVS:  Patient is tachycardic but sinus rhythm., S1 and S2 normal, no murmurs, no added heart sounds, rubs, gallops, regular peripheral pulses, and no peripheral edema  Resp:  Lungs clear to auscultation bilaterally, no wheezes, rales, or rhonci  GI:  Abdomen soft, non-tender,  non-distended, normoactive bowel sounds  MSK:  Full range of motion, no obvious deformities   Skin:  No rashes, ulcers, erythema  Neuro:  Alert and oriented x3, No focal neuro deficits, CNII-XII grossly intact  Psych:  Appropriate mood and affect         Laboratory:  Lab Results   Component Value Date    WBC 10.7 06/25/2018    HGB 17.1 06/25/2018    HCT 47.7 06/25/2018     06/25/2018    MCV 85.5 06/25/2018    RDW 13.7 06/25/2018     Lab Results   Component Value Date    HGBA1C 12.1 (H) 06/26/2018     (H) 06/26/2018    CREATININE 1.20 06/26/2018    Lab Results   Component Value Date     (L) 06/25/2018    K 4.0 07/02/2018    CO2 24 06/25/2018    BUN 20 (H) 06/26/2018    CREATININE 1.20 06/26/2018    CALCIUM 10.0 06/25/2018     No results found for: TSH, [id number], Z0YQWLZ, B9SPXWV, THYROIDAB       Anemia: No results found for: IRON, TIBC, FERRITIN, [id number], FOLATE    Current Medications:  Current Outpatient Medications   Medication Instructions    albuterol (PROVENTIL/VENTOLIN HFA) 90 mcg/actuation inhaler 2 puffs, Inhalation, As needed (PRN)    aspirin 81 mg, Oral, Daily    atorvastatin (LIPITOR) 80 mg, Oral, Daily    canagliflozin (INVOKANA) 300 mg, Oral, Daily    clopidogreL (PLAVIX) 75 mg, Oral, Daily    EASY TOUCH TWIST LANCETS 33 gauge Misc USE ONE TWICE DAILY    famotidine (PEPCID) 40 mg, Oral, 2 times daily    fluticasone propionate (FLONASE) 50 mcg/actuation nasal spray 1 spray, Each Nostril, As needed (PRN)    fluticasone-salmeterol 230-21 mcg/dose (ADVAIR HFA) 230-21 mcg/actuation HFAA inhaler 1 puff, Inhalation, As needed (PRN), Controller    gabapentin (NEURONTIN) 100 mg, Oral, 3 times daily    glipiZIDE (GLUCOTROL) 20 mg, Oral, 2 times daily    lisinopriL 10 mg, Oral, Daily    metFORMIN (GLUCOPHAGE) 1,000 mg, Oral, 2 times daily    metoprolol tartrate (LOPRESSOR) 50 mg, Oral, 2 times daily    omeprazole (PRILOSEC) 40 mg, Oral, Daily    pioglitazone (ACTOS) 30 mg,  Oral, Daily    TRESIBA FLEXTOUCH U-200 44 Units, Subcutaneous, Daily    TRUE METRIX GLUCOSE METER Misc USE ONE TWICE DAILY    TRULICITY 1.5 mg, Subcutaneous, Every 7 days        Assessment and Plan:        Health Maintenance   Topic Date Due    Hepatitis C Screening  Never done    Lipid Panel  Never done    TETANUS VACCINE  Never done    High Dose Statin  06/13/2023    Abdominal Aortic Aneurysm Screening  Completed      Uncontrolled type 2 diabetes mellitus:  -Hemoglobin A1c on 02/2022 was 10.2 today it is 9.2.  -Patient is currently on metformin 1000 b.i.d., glipizide 20 b.i.d., Invokana 100 mg, Linagliptin 5 mg, Trulicity 0.75 qweek and glargine 36 units b.i.d..  -Discontinue linagliptin, increased Invokana to 300 mg day, increase Trulicity to 1.5 mg qweek, changed glargine 36 bid to tresbia 45 units daily.  -Continue metformin 1000 b.i.d., glipizide 20 b.i.d.  -I have informed patient that it is important that he maintains a carb free diet    Coronary artery disease:  -Continue atorvastatin 80    Return to clinic in three months        Tyler Moran MD

## 2022-08-03 DIAGNOSIS — I25.10 CORONARY ARTERY DISEASE, UNSPECIFIED VESSEL OR LESION TYPE, UNSPECIFIED WHETHER ANGINA PRESENT, UNSPECIFIED WHETHER NATIVE OR TRANSPLANTED HEART: ICD-10-CM

## 2022-08-03 DIAGNOSIS — Z12.5 PROSTATE CANCER SCREENING: ICD-10-CM

## 2022-08-03 DIAGNOSIS — Z79.4 TYPE 2 DIABETES MELLITUS WITHOUT COMPLICATION, WITH LONG-TERM CURRENT USE OF INSULIN: Primary | ICD-10-CM

## 2022-08-03 DIAGNOSIS — Z11.9 SCREENING EXAMINATION FOR INFECTIOUS DISEASE: ICD-10-CM

## 2022-08-03 DIAGNOSIS — E11.9 TYPE 2 DIABETES MELLITUS WITHOUT COMPLICATION, WITH LONG-TERM CURRENT USE OF INSULIN: Primary | ICD-10-CM

## 2022-08-03 RX ORDER — DULAGLUTIDE 0.75 MG/.5ML
INJECTION, SOLUTION SUBCUTANEOUS
COMMUNITY
Start: 2022-07-19 | End: 2022-09-28

## 2022-08-04 RX ORDER — FAMOTIDINE 40 MG/1
40 TABLET, FILM COATED ORAL 2 TIMES DAILY
Qty: 180 TABLET | Refills: 3 | Status: ON HOLD | OUTPATIENT
Start: 2022-08-04 | End: 2023-07-05 | Stop reason: SDUPTHER

## 2022-08-04 NOTE — TELEPHONE ENCOUNTER
Clinical staff: Prescription for famotidine approved and sent to pharmacy.     Clerical staff: Please contact patient to schedule follow up visit. Lab orders placed as he will need to complete fasting blood work before his appointment.     Thank you

## 2022-08-15 ENCOUNTER — HOSPITAL ENCOUNTER (EMERGENCY)
Facility: HOSPITAL | Age: 67
Discharge: HOME | End: 2022-08-15
Attending: FAMILY MEDICINE
Payer: MEDICARE

## 2022-08-15 VITALS
HEART RATE: 92 BPM | DIASTOLIC BLOOD PRESSURE: 94 MMHG | RESPIRATION RATE: 18 BRPM | HEIGHT: 71 IN | WEIGHT: 252.44 LBS | OXYGEN SATURATION: 97 % | SYSTOLIC BLOOD PRESSURE: 148 MMHG | BODY MASS INDEX: 35.34 KG/M2 | TEMPERATURE: 97 F

## 2022-08-15 DIAGNOSIS — R07.89 CHEST PAIN, ATYPICAL: Primary | ICD-10-CM

## 2022-08-15 DIAGNOSIS — R07.9 CHEST PAIN: ICD-10-CM

## 2022-08-15 LAB
ALBUMIN SERPL-MCNC: 3.5 GM/DL (ref 3.4–4.8)
ALBUMIN/GLOB SERPL: 0.8 RATIO (ref 1.1–2)
ALP SERPL-CCNC: 102 UNIT/L (ref 40–150)
ALT SERPL-CCNC: 32 UNIT/L (ref 0–55)
AST SERPL-CCNC: 23 UNIT/L (ref 5–34)
BASOPHILS # BLD AUTO: 0.1 X10(3)/MCL (ref 0–0.2)
BASOPHILS NFR BLD AUTO: 1 %
BILIRUBIN DIRECT+TOT PNL SERPL-MCNC: 0.5 MG/DL
BUN SERPL-MCNC: 23.2 MG/DL (ref 8.4–25.7)
CALCIUM SERPL-MCNC: 9.9 MG/DL (ref 8.8–10)
CHLORIDE SERPL-SCNC: 106 MMOL/L (ref 98–107)
CK MB SERPL-MCNC: 3.8 NG/ML
CK SERPL-CCNC: 125 U/L (ref 30–200)
CO2 SERPL-SCNC: 24 MMOL/L (ref 23–31)
CREAT SERPL-MCNC: 1.06 MG/DL (ref 0.73–1.18)
EOSINOPHIL # BLD AUTO: 0.35 X10(3)/MCL (ref 0–0.9)
EOSINOPHIL NFR BLD AUTO: 3.5 %
ERYTHROCYTE [DISTWIDTH] IN BLOOD BY AUTOMATED COUNT: 16.5 % (ref 11.5–17)
GFR SERPLBLD CREATININE-BSD FMLA CKD-EPI: >60 MLS/MIN/1.73/M2
GLOBULIN SER-MCNC: 4.3 GM/DL (ref 2.4–3.5)
GLUCOSE SERPL-MCNC: 190 MG/DL (ref 82–115)
HCT VFR BLD AUTO: 45.2 % (ref 42–52)
HGB BLD-MCNC: 15.9 GM/DL (ref 14–18)
IMM GRANULOCYTES # BLD AUTO: 0.05 X10(3)/MCL (ref 0–0.04)
IMM GRANULOCYTES NFR BLD AUTO: 0.5 %
LYMPHOCYTES # BLD AUTO: 2.97 X10(3)/MCL (ref 0.6–4.6)
LYMPHOCYTES NFR BLD AUTO: 29.8 %
MCH RBC QN AUTO: 27.6 PG (ref 27–31)
MCHC RBC AUTO-ENTMCNC: 35.2 MG/DL (ref 33–36)
MCV RBC AUTO: 78.3 FL (ref 80–94)
MONOCYTES # BLD AUTO: 0.99 X10(3)/MCL (ref 0.1–1.3)
MONOCYTES NFR BLD AUTO: 9.9 %
NEUTROPHILS # BLD AUTO: 5.5 X10(3)/MCL (ref 2.1–9.2)
NEUTROPHILS NFR BLD AUTO: 55.3 %
NRBC BLD AUTO-RTO: 0 %
PLATELET # BLD AUTO: 251 X10(3)/MCL (ref 130–400)
PMV BLD AUTO: 10.2 FL (ref 7.4–10.4)
POTASSIUM SERPL-SCNC: 4.7 MMOL/L (ref 3.5–5.1)
PROT SERPL-MCNC: 7.8 GM/DL (ref 5.8–7.6)
RBC # BLD AUTO: 5.77 X10(6)/MCL (ref 4.7–6.1)
SODIUM SERPL-SCNC: 140 MMOL/L (ref 136–145)
TROPONIN I SERPL-MCNC: 0.01 NG/ML (ref 0–0.04)
TROPONIN I SERPL-MCNC: 0.02 NG/ML (ref 0–0.04)
WBC # SPEC AUTO: 10 X10(3)/MCL (ref 4.5–11.5)

## 2022-08-15 PROCEDURE — 80053 COMPREHEN METABOLIC PANEL: CPT | Performed by: FAMILY MEDICINE

## 2022-08-15 PROCEDURE — 36415 COLL VENOUS BLD VENIPUNCTURE: CPT | Performed by: FAMILY MEDICINE

## 2022-08-15 PROCEDURE — 63600175 PHARM REV CODE 636 W HCPCS: Performed by: FAMILY MEDICINE

## 2022-08-15 PROCEDURE — 84484 ASSAY OF TROPONIN QUANT: CPT | Performed by: FAMILY MEDICINE

## 2022-08-15 PROCEDURE — 82550 ASSAY OF CK (CPK): CPT | Performed by: FAMILY MEDICINE

## 2022-08-15 PROCEDURE — 85025 COMPLETE CBC W/AUTO DIFF WBC: CPT | Performed by: FAMILY MEDICINE

## 2022-08-15 PROCEDURE — 93005 ELECTROCARDIOGRAM TRACING: CPT

## 2022-08-15 PROCEDURE — 96374 THER/PROPH/DIAG INJ IV PUSH: CPT

## 2022-08-15 PROCEDURE — 99285 EMERGENCY DEPT VISIT HI MDM: CPT | Mod: 25

## 2022-08-15 PROCEDURE — 82553 CREATINE MB FRACTION: CPT | Performed by: FAMILY MEDICINE

## 2022-08-15 RX ORDER — KETOROLAC TROMETHAMINE 30 MG/ML
15 INJECTION, SOLUTION INTRAMUSCULAR; INTRAVENOUS
Status: COMPLETED | OUTPATIENT
Start: 2022-08-15 | End: 2022-08-15

## 2022-08-15 RX ADMIN — KETOROLAC TROMETHAMINE 15 MG: 30 INJECTION, SOLUTION INTRAMUSCULAR; INTRAVENOUS at 05:08

## 2022-08-15 NOTE — ED PROVIDER NOTES
"Encounter Date: 8/15/2022       History     Chief Complaint   Patient presents with    Chest Pain     C/o chest pain x3 days. Also reports SOB . Hx of stent placement.     Pt presents with chest pain.  Pt reports" chest pain starts on right side underneath my right arm and goes across my chest "  Pt reports " it hurts when I touch it and it is a burning type of pain". Pt  Denies sob, ha, fever, chills, dizziness, weakness, abdominal pain, N/V/D.      PMH:    CAD/NSTEMI 2/2016 s/p PCI of left circumflex   inferior wall STEMI s/p PTCA/BHAKTI of distal RCA in 9/2017,   hypertension,   hypercholesterolemia,   diabetes,   chronic tobacco use    The history is provided by the patient. No  was used.   Chest Pain  Illness onset: 3 days ago. Chest pain occurs intermittently. The chest pain is unchanged. Associated with: palpation. At its most intense, the chest pain is at 5/10. The chest pain is currently at 3/10. The quality of the pain is described as burning. Radiates to: " radiates across chest "  Pertinent negatives for primary symptoms include no fever, no fatigue, no syncope, no shortness of breath, no cough, no wheezing, no palpitations, no abdominal pain, no nausea, no vomiting, no dizziness and no altered mental status.   Pertinent negatives for associated symptoms include no claudication, no diaphoresis, no lower extremity edema, no near-syncope, no numbness, no orthopnea, no paroxysmal nocturnal dyspnea and no weakness. He tried nothing for the symptoms. Risk factors include being elderly.   His past medical history is significant for CAD, hyperlipidemia and hypertension.   Procedure history is positive for cardiac catheterization, echocardiogram and coronary CTA.     Review of patient's allergies indicates:  No Known Allergies  Past Medical History:   Diagnosis Date    Coronary artery disease     Diabetes mellitus     Hypertension      Past Surgical History:   Procedure Laterality Date    " CORONARY ANGIOPLASTY       History reviewed. No pertinent family history.  Social History     Tobacco Use    Smoking status: Current Every Day Smoker     Packs/day: 0.50     Types: Cigarettes    Smokeless tobacco: Never Used   Substance Use Topics    Alcohol use: Yes     Comment: beer 2x/month    Drug use: Yes     Types: Marijuana     Comment: prn     Review of Systems   Constitutional: Negative for diaphoresis, fatigue and fever.   HENT: Negative for congestion.    Eyes: Negative for visual disturbance.   Respiratory: Negative for cough, shortness of breath and wheezing.    Cardiovascular: Positive for chest pain. Negative for palpitations, orthopnea, claudication, syncope and near-syncope.   Gastrointestinal: Negative for abdominal pain, nausea and vomiting.   Musculoskeletal: Negative for back pain and neck stiffness.   Neurological: Negative for dizziness, weakness and numbness.       Physical Exam     Initial Vitals   BP Pulse Resp Temp SpO2   08/15/22 1454 08/15/22 1340 08/15/22 1340 08/15/22 1340 08/15/22 1340   (!) 148/95 103 16 97.2 °F (36.2 °C) 99 %      MAP       --                Physical Exam    Nursing note and vitals reviewed.  Constitutional: He appears well-developed and well-nourished. No distress.   HENT:   Head: Normocephalic and atraumatic.   Eyes: Conjunctivae and EOM are normal. Pupils are equal, round, and reactive to light.   Cardiovascular: Normal rate and regular rhythm.   Pulmonary/Chest: Breath sounds normal.   Tenderness to palpation on right anterior chest wall.    Abdominal: Abdomen is soft. Bowel sounds are normal. There is no abdominal tenderness. There is no rebound and no guarding.   Musculoskeletal:         General: Normal range of motion.     Neurological: He is alert and oriented to person, place, and time. He has normal strength.   Skin: Skin is warm and dry. Capillary refill takes less than 2 seconds.   Psychiatric: He has a normal mood and affect. His behavior is normal.  Judgment and thought content normal.         ED Course   Procedures  Labs Reviewed   COMPREHENSIVE METABOLIC PANEL - Abnormal; Notable for the following components:       Result Value    Glucose Level 190 (*)     Protein Total 7.8 (*)     Globulin 4.3 (*)     Albumin/Globulin Ratio 0.8 (*)     All other components within normal limits   CBC WITH DIFFERENTIAL - Abnormal; Notable for the following components:    MCV 78.3 (*)     IG# 0.05 (*)     All other components within normal limits   CK - Normal   CK-MB - Normal   TROPONIN I - Normal   TROPONIN I - Normal   CBC W/ AUTO DIFFERENTIAL    Narrative:     The following orders were created for panel order CBC Auto Differential.  Procedure                               Abnormality         Status                     ---------                               -----------         ------                     CBC with Differential[[id number]]        Abnormal            Final result                 Please view results for these tests on the individual orders.   EXTRA TUBES    Narrative:     The following orders were created for panel order EXTRA TUBES.  Procedure                               Abnormality         Status                     ---------                               -----------         ------                     Red Top Hold[[id number]]                                                                  Please view results for these tests on the individual orders.   RED TOP HOLD     EKG Readings: (Independently Interpreted)   Rhythm: Sinus Tachycardia. Heart Rate: 101. Ectopy: No Ectopy. ST Segments: Normal ST Segments. T Waves: Normal.     ECG Results          EKG 12-lead (In process)  Result time 08/15/22 17:17:27    In process by Interface, Lab In [facility] (08/15/22 17:17:27)                 Narrative:    Test Reason : R07.9,    Vent. Rate : 101 BPM     Atrial Rate : 101 BPM     P-R Int : 152 ms          QRS Dur : 078 ms      QT Int : 352 ms       P-R-T Axes : 057 002 071  degrees     QTc Int : 456 ms    Sinus tachycardia  Otherwise normal ECG  When compared with ECG of 13-JUN-2022 09:25,  No significant change was found    Referred By: [email or web address]   SELF           Confirmed By:                             Imaging Results          X-Ray Chest PA And Lateral (Final result)  Result time 08/15/22 16:08:44    Final result by Alex Schmidt MD (08/15/22 16:08:44)                 Impression:      Minimal blunting of the right costophrenic angle with associated pleural thickening along side the lateral chest wall similar to previous exam.    Otherwise no active pulmonary disease      Electronically signed by: Alex Schmidt  Date:    08/15/2022  Time:    16:08             Narrative:    EXAMINATION:  XR CHEST PA AND LATERAL    CLINICAL HISTORY:  , Chest pain, unspecified.    COMPARISON:  Tess 15, 2021    FINDINGS:  No alveolar consolidation, effusion, or pneumothorax is seen.   The thoracic aorta is normal  cardiac silhouette, central pulmonary vessels and mediastinum are normal in size and are grossly unremarkable. There is some pleural thickening along side the lateral chest wall on the right with some blunting of the right costophrenic angle similar to what was seen on the examination of Tess 15, 2021.    No focal consolidative changes                                 Medications   ketorolac injection 15 mg (15 mg Intravenous Given 8/15/22 1720)     Medical Decision Making:   Initial Assessment:   Pt presents with chest pain, onset 3 days ago     ED Management:  Pt reassessed multiple times in ED.  Second set of troponin is negative.  Pt reports he is feeling better with treatment in ED.     This patient presents with chest pain that is very unlikely angina or acute coronary syndrome. The emergency department evaluation has not identified any cause for suspicion that this chest pain has a cardiac etiology. Based on their history, EKG (which showed no evidence of ischemia or infarction)  and imaging, in addition to the patient's physical exam, I see no evidence at this time for a malignant etiology for the patient's chest pain. There is no acute evidence for pulmonary embolus, acute myocardial infarction, pneumothorax, Boerhaeve syndrome, cardiac tamponade, thoracic artery dissection, or any other emergent cardiac, pulmonary or aortic pathology. Given the low pre-test probability for cardiac etiology of chest pain and the absence of any sign of ischemia or infarction, discharge for outpatient follow-up and further evaluation is reasonable.    I have explained to the patient that even though a cardiac problem is very unlikely, follow-up and further testing is required to reduce further the already small uncertainty that exists. Other life-threatening diagnoses have been considered. The patient understands the need to return immediately if their symptoms worsen or they develop any new symptoms, and not to engage in any significant exertional activity until follow-up is obtained.    The patient is resting comfortably and is in no acute distress.  John Addison states that symptoms have improved after treatment within ER. Discussed test results, shared treatment plan, specific conditions for return, and importance of follow up with patient and family. The patient understands and agrees with the plan as discussed. Answered all patient questions at this time.  John Addison has remained hemodynamically stable throughout the ED course and is appropriate for discharge home.                  ED Course as of 08/15/22 2152   Mon Aug 15, 2022   1822 Troponin I [AK]      ED Course User Index  [AK] Daya Forbes MD             Clinical Impression:   Final diagnoses:  [R07.9] Chest pain  [R07.89] Chest pain, atypical (Primary)          ED Disposition Condition    Discharge Stable        ED Prescriptions     None        Follow-up Information     Follow up With Specialties Details Why Contact Info    [facility]  [place] - Emergency Dept Emergency Medicine  As needed, If symptoms worsen [address] [facility] [id number] [phone]    Cammy Christianson NP Nurse Practitioner   [address] [facility] [address]  [phone]             Daya Frobes MD  08/15/22 3549

## 2022-08-15 NOTE — ED NOTES
"Assumed care of pt at this time. Pt presents with c/o right sided rib pain that radiates across chest x 2 days. Pt reports pain as "burning" sensation, states pain is worse with movement of right arm. Pt denies weakness, denies SOB, denies n/v, denies palpitations. Hx of MI, cardiac stents, DM, HTN. Cardiac monitor applied to pt, call bell placed within reach. Will continue to monitor.  "

## 2022-08-22 ENCOUNTER — TELEPHONE (OUTPATIENT)
Dept: INTERNAL MEDICINE | Facility: CLINIC | Age: 67
End: 2022-08-22

## 2022-08-22 ENCOUNTER — OFFICE VISIT (OUTPATIENT)
Dept: INTERNAL MEDICINE | Facility: CLINIC | Age: 67
End: 2022-08-22
Payer: MEDICARE

## 2022-08-22 ENCOUNTER — CLINICAL SUPPORT (OUTPATIENT)
Dept: INTERNAL MEDICINE | Facility: CLINIC | Age: 67
End: 2022-08-22
Attending: NURSE PRACTITIONER
Payer: MEDICARE

## 2022-08-22 VITALS
WEIGHT: 257 LBS | BODY MASS INDEX: 35.98 KG/M2 | TEMPERATURE: 98 F | RESPIRATION RATE: 20 BRPM | DIASTOLIC BLOOD PRESSURE: 84 MMHG | HEART RATE: 92 BPM | HEIGHT: 71 IN | SYSTOLIC BLOOD PRESSURE: 135 MMHG

## 2022-08-22 DIAGNOSIS — F17.219 CIGARETTE NICOTINE DEPENDENCE WITH NICOTINE-INDUCED DISORDER: ICD-10-CM

## 2022-08-22 DIAGNOSIS — Z12.11 COLON CANCER SCREENING: ICD-10-CM

## 2022-08-22 DIAGNOSIS — Z12.5 PROSTATE CANCER SCREENING: ICD-10-CM

## 2022-08-22 DIAGNOSIS — E78.5 HYPERLIPIDEMIA LDL GOAL <70: ICD-10-CM

## 2022-08-22 DIAGNOSIS — I25.10 CORONARY ARTERY DISEASE, UNSPECIFIED VESSEL OR LESION TYPE, UNSPECIFIED WHETHER ANGINA PRESENT, UNSPECIFIED WHETHER NATIVE OR TRANSPLANTED HEART: ICD-10-CM

## 2022-08-22 DIAGNOSIS — E11.40 TYPE 2 DIABETES MELLITUS WITH DIABETIC NEUROPATHY, WITHOUT LONG-TERM CURRENT USE OF INSULIN: ICD-10-CM

## 2022-08-22 DIAGNOSIS — J44.9 CHRONIC OBSTRUCTIVE PULMONARY DISEASE, UNSPECIFIED COPD TYPE: Primary | ICD-10-CM

## 2022-08-22 DIAGNOSIS — I10 HYPERTENSION, UNSPECIFIED TYPE: ICD-10-CM

## 2022-08-22 DIAGNOSIS — R07.89 ATYPICAL CHEST PAIN: ICD-10-CM

## 2022-08-22 PROBLEM — Q16.5 TEGMEN DEFECT OF BASE OF SKULL: Status: ACTIVE | Noted: 2022-08-22

## 2022-08-22 PROBLEM — F17.200 SMOKING: Status: RESOLVED | Noted: 2022-06-13 | Resolved: 2022-08-22

## 2022-08-22 PROBLEM — Z78.9 CURRENT DRINKER OF ALCOHOL: Status: ACTIVE | Noted: 2022-08-22

## 2022-08-22 PROBLEM — R19.00 ABDOMINAL SWELLING: Status: ACTIVE | Noted: 2022-08-22

## 2022-08-22 PROBLEM — R59.0 INTRA-ABDOMINAL LYMPHADENOPATHY: Status: ACTIVE | Noted: 2022-08-22

## 2022-08-22 PROBLEM — R79.89 ABNORMAL LIVER FUNCTION TESTS: Status: ACTIVE | Noted: 2022-08-22

## 2022-08-22 PROBLEM — D64.9 ANEMIA: Status: ACTIVE | Noted: 2022-08-22

## 2022-08-22 PROBLEM — R91.8 ABNORMAL FINDINGS ON DIAGNOSTIC IMAGING OF LUNG: Status: ACTIVE | Noted: 2022-08-22

## 2022-08-22 PROBLEM — H10.45 CHRONIC ALLERGIC CONJUNCTIVITIS: Status: ACTIVE | Noted: 2022-08-22

## 2022-08-22 PROCEDURE — 92228 IMG RTA DETC/MNTR DS PHY/QHP: CPT | Mod: 59,PBBFAC

## 2022-08-22 PROCEDURE — 99214 OFFICE O/P EST MOD 30 MIN: CPT | Mod: S$PBB,,, | Performed by: NURSE PRACTITIONER

## 2022-08-22 PROCEDURE — 99214 PR OFFICE/OUTPT VISIT, EST, LEVL IV, 30-39 MIN: ICD-10-PCS | Mod: S$PBB,,, | Performed by: NURSE PRACTITIONER

## 2022-08-22 PROCEDURE — 99214 OFFICE O/P EST MOD 30 MIN: CPT | Mod: PBBFAC | Performed by: NURSE PRACTITIONER

## 2022-08-22 RX ORDER — LISINOPRIL 10 MG/1
10 TABLET ORAL DAILY
Qty: 90 TABLET | Refills: 3 | Status: ON HOLD | OUTPATIENT
Start: 2022-08-22 | End: 2023-06-14 | Stop reason: HOSPADM

## 2022-08-22 RX ORDER — CROMOLYN SODIUM 40 MG/ML
1 SOLUTION/ DROPS OPHTHALMIC
Status: ON HOLD | COMMUNITY
Start: 2022-04-26 | End: 2023-07-05 | Stop reason: HOSPADM

## 2022-08-22 RX ORDER — ATORVASTATIN CALCIUM 80 MG/1
80 TABLET, FILM COATED ORAL DAILY
Qty: 90 TABLET | Refills: 3 | Status: ON HOLD | OUTPATIENT
Start: 2022-08-22 | End: 2023-07-05 | Stop reason: SDUPTHER

## 2022-08-22 RX ORDER — LINAGLIPTIN 5 MG/1
5 TABLET, FILM COATED ORAL
COMMUNITY
Start: 2022-04-26 | End: 2022-09-28

## 2022-08-22 RX ORDER — CLOPIDOGREL BISULFATE 75 MG/1
75 TABLET ORAL DAILY
Qty: 90 TABLET | Refills: 3 | Status: ON HOLD | OUTPATIENT
Start: 2022-08-22 | End: 2023-07-05 | Stop reason: SDUPTHER

## 2022-08-22 RX ORDER — METOPROLOL TARTRATE 50 MG/1
50 TABLET ORAL 2 TIMES DAILY
Qty: 180 TABLET | Refills: 3 | Status: ON HOLD | OUTPATIENT
Start: 2022-08-22 | End: 2023-06-14 | Stop reason: HOSPADM

## 2022-08-22 RX ORDER — FLUTICASONE PROPIONATE AND SALMETEROL XINAFOATE 230; 21 UG/1; UG/1
1 AEROSOL, METERED RESPIRATORY (INHALATION) 2 TIMES DAILY
Qty: 12 G | Refills: 11 | Status: ON HOLD | OUTPATIENT
Start: 2022-08-22 | End: 2023-07-05 | Stop reason: HOSPADM

## 2022-08-22 RX ORDER — INSULIN GLARGINE 100 [IU]/ML
INJECTION, SOLUTION SUBCUTANEOUS
COMMUNITY
Start: 2022-03-08 | End: 2022-09-28

## 2022-08-22 RX ORDER — ALBUTEROL SULFATE 90 UG/1
2 AEROSOL, METERED RESPIRATORY (INHALATION)
Qty: 18 G | Refills: 11 | Status: SHIPPED | OUTPATIENT
Start: 2022-08-22

## 2022-08-22 RX ORDER — NITROFURANTOIN 25; 75 MG/1; MG/1
100 CAPSULE ORAL
COMMUNITY
Start: 2022-04-26 | End: 2022-08-22

## 2022-08-22 NOTE — PROGRESS NOTES
John Addison is a 67 y.o. male here for a diabetic eye screening with non-dilated fundus photos per Cammy Christianson NP.    Patient cooperative?: Yes  Small pupils?: No  Last eye exam: unknown    For exam results, see Encounter Report.

## 2022-08-22 NOTE — ASSESSMENT & PLAN NOTE
8/15/22 ER note reviewed. He reports symptoms resolved. He is followed by [facility] and advised on return of symptoms patient may contact Cardiologist provider for sooner appointment or ER precautions

## 2022-08-22 NOTE — ASSESSMENT & PLAN NOTE
Deferred to [facility]  A1c 9.2% June 24, 2022   CBG checks  Hypoglycemia: none  Microalbumin/creatinine ratio   Educated on ADA diet:  1. Avoid/ decrease high carbohydrate foods (rice, pasta, bread, candy, sweets, carbonated beverages)  Educated on health benefits of at least 5 days/ week of 30 minutes moderate intensity exercise (brisk walking) and 2 or more days/ week of muscle strength activities  Avoid alcohol or tobacco use if applicable  Eye exam: 9/10/2020 fundus photos no DR, pvd- return in 1 year; previously ordered and not completed; ordered 8/22/22  Foot exam: 8/22/22  Continue ASA, Plavix and statin as prescribed.  Keep appointment and follow up with [facility] as scheduled

## 2022-08-22 NOTE — ASSESSMENT & PLAN NOTE
Avoid tobacco/ alcohol.  Regular exercise as tolerated.  Continue medications as prescribed, anticoagulation as prescribed, keep f/u with Cardiologist.  ER precautions for CP, SOB, palpitations, dizziness, syncope, weakness.

## 2022-08-22 NOTE — ASSESSMENT & PLAN NOTE
Cbc,cmp, LDH prior to f/u -standing LDL 50, Trig 82, HLD 35, Total 101 February 2022   Avoid tobacco/ alcohol  Follow low fat/low cholesterol diet such as avoid/ decrease hurtado, sausage, fried foods, cookies, cakes, chips, cheese, whole milk, butter, mayonnaise. Add olive oil, avocados, lean meats, fresh fruits/ vegetables, heart healthy nuts to diet.  Educated on health benefits of exercise 5 days/ week of at least 30 minutes moderate intensity exercise (brisk walking) and 2 days/ week of muscle strength activities  Daily ASA 81 mg for CV prevention  Continue current medication regimen

## 2022-08-22 NOTE — TELEPHONE ENCOUNTER
Please contact patient and let him know diabetic eye results were normal. It is recommended to recheck in year for screening.     Thank you

## 2022-08-22 NOTE — ASSESSMENT & PLAN NOTE
/84  Follow low sodium diet, < 2 gm/day (avoid high salty foods such as processed meats/ sausage/hurtado/ sandwich meat, chips, pickles, cheese, crackers and soft drinks/ electrolyte replacement drinks).  Avoid tobacco/ alcohol use  Educated on health benefits of at least 5 days/ week of 30 minutes moderate intensity exercise (brisk walking) and 2 or more days/ week of muscle strength activities  Daily ASA 81 mg for CV prevention  Continue current medication regimen

## 2022-08-22 NOTE — PROGRESS NOTES
Acmmy L Sammy, NP   [facility]  [address] [address]      PATIENT NAME: John Addison  : 1955  DATE: 22  MRN: [id number]      Billing Provider: Cammy Christianson NP  Level of Service: SD OFFICE/OUTPT VISIT, EST, LEVL IV, 30-39 MIN  Patient PCP Information     Provider PCP Type    Cammy Christianson NP General          Reason for Visit / Chief Complaint: Er follow up       History of Present Illness / Problem Focused Workflow     John Addison presents to the clinic with Er follow up     66 yo AAM for routine follow up presenting unaccompanied. PMH includes HTN, HLD, CAD/NSTEMI (2016) s/p PCI of left circumflex, inferior wall STEMI s/p PTCA/BHAKTI of distal RCA 2017, type 2 DM, elevated LFTs, lymphadenopathy, tobacco abuse, and morbid obesity. He did not bring medications for visit. Has home health to check blood sugars. He is under care of [facility]. /84. He refuses colonoscopy however willing to complete stool test. Otherwise, denies any other concerns/ complaints, CP, SOB, HA, dizziness, abdominal pain, poor appetite, n/v/d, bloody stools.     Other providers:  [facility]  [facility]  [facility] Pulmonology- last visit 21  [facility]   Dr. Romero/ Dr. Juarez Podiatry  [facility]      Review of Systems     Review of Systems   Constitutional: Negative for appetite change, chills, fatigue, fever and unexpected weight change.   HENT: Negative for congestion, ear pain, hearing loss, sinus pressure, sore throat and tinnitus.    Respiratory: Negative for cough, chest tightness, shortness of breath and wheezing.    Cardiovascular: Negative for chest pain, palpitations and leg swelling.   Gastrointestinal: Negative for abdominal distention, abdominal pain, blood in stool, constipation, diarrhea, nausea and vomiting.   Genitourinary: Negative for dysuria and hematuria.   Musculoskeletal: Negative for arthralgias and  myalgias.   Skin: Negative for pallor.   Allergic/Immunologic: Negative for environmental allergies.   Neurological: Negative for dizziness, syncope, weakness, numbness and headaches.   Hematological: Negative for adenopathy. Does not bruise/bleed easily.   Psychiatric/Behavioral: Negative for agitation, behavioral problems, confusion, hallucinations, sleep disturbance and suicidal ideas. The patient is not nervous/anxious.        Medical / Social / Family History     Past Medical History:   Diagnosis Date    Coronary artery disease     Diabetes mellitus     Hypertension        Past Surgical History:   Procedure Laterality Date    CORONARY ANGIOPLASTY      TONSILLECTOMY  07/2018       Social History  Mr. Lopez  reports that he has been smoking cigarettes. He has been smoking about 0.50 packs per day. He has never used smokeless tobacco. He reports current alcohol use. He reports current drug use. Drug: Marijuana.    Family History  Mr. Lopez's family history includes Coronary artery disease in his mother; Heart attack in his mother; Heart failure in his mother; Hypertension in his mother.    Medications and Allergies     Medications  No outpatient medications have been marked as taking for the 8/22/22 encounter (Office Visit) with Cammy Christianosn NP.       Allergies  Review of patient's allergies indicates:  No Known Allergies    Physical Examination     Vitals:    08/22/22 0820   BP: 135/84   Pulse: 92   Resp: 20   Temp: 98.3 °F (36.8 °C)     Physical Exam  Constitutional:       General: He is not in acute distress.     Appearance: Normal appearance. He is not ill-appearing.   HENT:      Head: Normocephalic.      Right Ear: Tympanic membrane, ear canal and external ear normal.      Left Ear: Tympanic membrane, ear canal and external ear normal.      Nose: Nose normal.      Mouth/Throat:      Mouth: Mucous membranes are moist.   Eyes:      Extraocular Movements: Extraocular movements intact.       Conjunctiva/sclera: Conjunctivae normal.      Pupils: Pupils are equal, round, and reactive to light.   Neck:      Vascular: No carotid bruit.   Cardiovascular:      Rate and Rhythm: Normal rate and regular rhythm.      Pulses: Normal pulses.           Dorsalis pedis pulses are 2+ on the right side and 2+ on the left side.        Posterior tibial pulses are 2+ on the right side and 2+ on the left side.      Heart sounds: Normal heart sounds. No murmur heard.  Pulmonary:      Effort: Pulmonary effort is normal. No respiratory distress.      Breath sounds: Normal breath sounds.   Abdominal:      General: Bowel sounds are normal. There is no distension.      Palpations: Abdomen is soft. There is no mass.      Tenderness: There is no abdominal tenderness. There is no guarding.      Hernia: No hernia is present.      Comments: rounded   Musculoskeletal:         General: Normal range of motion.      Cervical back: Normal range of motion and neck supple. No tenderness.      Right lower leg: No edema.      Left lower leg: No edema.      Right foot: No deformity or bunion.      Left foot: No deformity or bunion.   Feet:      Right foot:      Protective Sensation: 5 sites tested. 5 sites sensed.      Skin integrity: Skin integrity normal. No ulcer, blister, skin breakdown, erythema, warmth, callus, dry skin or fissure.      Toenail Condition: Right toenails are abnormally thick and long.      Left foot:      Protective Sensation: 5 sites tested. 5 sites sensed.      Skin integrity: Skin integrity normal. No ulcer, blister, skin breakdown, erythema, warmth, callus, dry skin or fissure.      Toenail Condition: Left toenails are abnormally thick and long.   Lymphadenopathy:      Cervical: No cervical adenopathy.   Skin:     General: Skin is warm and dry.      Capillary Refill: Capillary refill takes less than 2 seconds.   Neurological:      Mental Status: He is alert and oriented to person, place, and time.      Motor: No  weakness.      Coordination: Coordination normal.      Gait: Gait normal.   Psychiatric:         Mood and Affect: Mood normal.         Behavior: Behavior normal.         Thought Content: Thought content normal.         Judgment: Judgment normal.           Results     Lab Results   Component Value Date    WBC 10.0 08/15/2022    RBC 5.77 08/15/2022    HGB 15.9 08/15/2022    HCT 45.2 08/15/2022    MCV 78.3 (L) 08/15/2022    MCH 27.6 08/15/2022    MCHC 35.2 08/15/2022    RDW 16.5 08/15/2022     08/15/2022    MPV 10.2 08/15/2022     Sodium Level   Date Value Ref Range Status   08/15/2022 140 136 - 145 mmol/L Final     Potassium Level   Date Value Ref Range Status   08/15/2022 4.7 3.5 - 5.1 mmol/L Final     Carbon Dioxide   Date Value Ref Range Status   08/15/2022 24 23 - 31 mmol/L Final     Blood Urea Nitrogen   Date Value Ref Range Status   08/15/2022 23.2 8.4 - 25.7 mg/dL Final     Creatinine   Date Value Ref Range Status   08/15/2022 1.06 0.73 - 1.18 mg/dL Final     Calcium Level Total   Date Value Ref Range Status   08/15/2022 9.9 8.8 - 10.0 mg/dL Final     Albumin Level   Date Value Ref Range Status   08/15/2022 3.5 3.4 - 4.8 gm/dL Final     Bilirubin Total   Date Value Ref Range Status   08/15/2022 0.5 <=1.5 mg/dL Final     Alkaline Phosphatase   Date Value Ref Range Status   08/15/2022 102 40 - 150 unit/L Final     Aspartate Aminotransferase   Date Value Ref Range Status   08/15/2022 23 5 - 34 unit/L Final     Alanine Aminotransferase   Date Value Ref Range Status   08/15/2022 32 0 - 55 unit/L Final     Estimated GFR-Non    Date Value Ref Range Status   06/26/2018 65 (L) >>=90 mL/min Final     No results found for: CHOL  No results found for: HDL  No results found for: LDLCALC  No results found for: TRIG  No results found for: CHOLHDL  No results found for: TSH  No results found for: PHUR, SPECGRAV, PROTEINUA, GLUCUA, KETONESU, OCCULTUA, NITRITE, LEUKOCYTESUR  Lab Results   Component Value  Date    HGBA1C 9.2 06/24/2022    HGBA1C 12.1 (H) 06/26/2018    HGBA1C 11.9 (H) 06/25/2018     No results found for: MARTINEZ CARLISLE   No results found for this or any previous visit.         Assessment and Plan (including Health Maintenance)     Plan:         Health Maintenance Due   Topic Date Due    Hepatitis C Screening  Never done    Lipid Panel  Never done    Diabetes Urine Screening  Never done    TETANUS VACCINE  Never done    Colorectal Cancer Screening  Never done    COVID-19 Vaccine (3 - Booster for Pfizer series) 01/31/2022       Problem List Items Addressed This Visit        Pulmonary    Chronic obstructive pulmonary disease - Primary    Current Assessment & Plan     Stable. Continue with inhalers as prescribed. Smoking cessation encouraged.           Relevant Medications    albuterol (PROVENTIL/VENTOLIN HFA) 90 mcg/actuation inhaler    fluticasone-salmeterol 230-21 mcg/dose (ADVAIR HFA) 230-21 mcg/actuation HFAA inhaler       Cardiac/Vascular    Coronary artery disease    Current Assessment & Plan     Avoid tobacco/ alcohol.  Regular exercise as tolerated.  Continue medications as prescribed, anticoagulation as prescribed, keep f/u with Cardiologist.  ER precautions for CP, SOB, palpitations, dizziness, syncope, weakness.              Relevant Medications    atorvastatin (LIPITOR) 80 MG tablet    clopidogreL (PLAVIX) 75 mg tablet    metoprolol tartrate (LOPRESSOR) 50 MG tablet    Hyperlipidemia LDL goal <70    Current Assessment & Plan     LDL 50, Trig 82, HLD 35, Total 101 February 2022   Avoid tobacco/ alcohol  Follow low fat/low cholesterol diet such as avoid/ decrease hurtado, sausage, fried foods, cookies, cakes, chips, cheese, whole milk, butter, mayonnaise. Add olive oil, avocados, lean meats, fresh fruits/ vegetables, heart healthy nuts to diet.  Educated on health benefits of exercise 5 days/ week of at least 30 minutes moderate intensity exercise (brisk walking) and 2 days/ week of  muscle strength activities  Daily ASA 81 mg for CV prevention  Continue current medication regimen             Relevant Medications    atorvastatin (LIPITOR) 80 MG tablet    Hypertension    Current Assessment & Plan     /84  Follow low sodium diet, < 2 gm/day (avoid high salty foods such as processed meats/ sausage/hurtado/ sandwich meat, chips, pickles, cheese, crackers and soft drinks/ electrolyte replacement drinks).  Avoid tobacco/ alcohol use  Educated on health benefits of at least 5 days/ week of 30 minutes moderate intensity exercise (brisk walking) and 2 or more days/ week of muscle strength activities  Daily ASA 81 mg for CV prevention  Continue current medication regimen             Relevant Medications    atorvastatin (LIPITOR) 80 MG tablet    lisinopriL 10 MG tablet    metoprolol tartrate (LOPRESSOR) 50 MG tablet       Endocrine    Type 2 diabetes mellitus with diabetic neuropathy, without long-term current use of insulin    Current Assessment & Plan     Deferred to [facility]  A1c 9.2% June 24, 2022  HH CBG checks  Hypoglycemia: none  Microalbumin/creatinine ratio   Educated on ADA diet:  1. Avoid/ decrease high carbohydrate foods (rice, pasta, bread, candy, sweets, carbonated beverages)  Educated on health benefits of at least 5 days/ week of 30 minutes moderate intensity exercise (brisk walking) and 2 or more days/ week of muscle strength activities  Avoid alcohol or tobacco use if applicable  Eye exam: 9/10/2020 fundus photos no DR, pvd- return in 1 year; previously ordered and not completed; ordered 8/22/22  Foot exam: 8/22/22  Continue ASA, Plavix and statin as prescribed.  Keep appointment and follow up with [facility] as scheduled              Relevant Medications    insulin glargine (LANTUS) 100 unit/mL injection    linaGLIPtin (TRADJENTA) 5 mg Tab tablet    lisinopriL 10 MG tablet    Other Relevant Orders    Diabetic Eye Screening Photo (Completed)       Other     Atypical chest pain    Current Assessment & Plan     8/15/22 ER note reviewed. He reports symptoms resolved. He is followed by [facility] and advised on return of symptoms patient may contact Cardiologist provider for sooner appointment or ER precautions           Cigarette nicotine dependence with nicotine-induced disorder    Current Assessment & Plan     0.5 ppd  Discussed for at least 3 minutes importance of smoking cessation and health benefits, including adverse effects to patient's health and organs.  Encouraged cessation.               Other Visit Diagnoses     Colon cancer screening        Relevant Orders    Cologuard Screening (Multitarget Stool DNA)          Health Maintenance Topics with due status: Not Due       Topic Last Completion Date    Influenza Vaccine 10/21/2021    High Dose Statin 06/24/2022    Hemoglobin A1c 06/24/2022    Foot Exam 08/22/2022    Eye Exam 08/22/2022       Future Appointments   Date Time Provider Department Center   10/5/2022  8:20 AM [facility]IDENTS, [facility] ENDOCRINOLOGY [facility] ENDOCR [place]   10/13/2022 10:00 AM Filomena Sutton MD [facility] CARD [place]   4/26/2023  1:15 PM Cammy Christianson NP [facility] INTMED [place]   4/27/2023  8:00 AM KYREE Lugo [facility] GASTRO [place]            Signature:  Cammy Christianson NP  [facility]  [address] [address]    Date of encounter: 8/22/22

## 2022-09-26 ENCOUNTER — DOCUMENTATION ONLY (OUTPATIENT)
Dept: ADMINISTRATIVE | Facility: HOSPITAL | Age: 67
End: 2022-09-26
Payer: MEDICARE

## 2022-09-28 ENCOUNTER — OFFICE VISIT (OUTPATIENT)
Dept: ENDOCRINOLOGY | Facility: CLINIC | Age: 67
End: 2022-09-28
Payer: MEDICARE

## 2022-09-28 VITALS
RESPIRATION RATE: 16 BRPM | BODY MASS INDEX: 36.05 KG/M2 | TEMPERATURE: 98 F | HEIGHT: 71 IN | WEIGHT: 257.5 LBS | DIASTOLIC BLOOD PRESSURE: 80 MMHG | SYSTOLIC BLOOD PRESSURE: 118 MMHG | HEART RATE: 105 BPM

## 2022-09-28 LAB — HBA1C MFR BLD: 12.2 %

## 2022-09-28 PROCEDURE — 99215 OFFICE O/P EST HI 40 MIN: CPT | Mod: PBBFAC

## 2022-09-28 PROCEDURE — 83036 HEMOGLOBIN GLYCOSYLATED A1C: CPT | Mod: PBBFAC

## 2022-09-28 RX ORDER — INSULIN DEGLUDEC 200 U/ML
55 INJECTION, SOLUTION SUBCUTANEOUS DAILY
Qty: 9 PEN | Refills: 2 | Status: SHIPPED | OUTPATIENT
Start: 2022-09-28 | End: 2023-03-27

## 2022-09-28 RX ORDER — DULAGLUTIDE 1.5 MG/.5ML
3 INJECTION, SOLUTION SUBCUTANEOUS
Qty: 8 PEN | Refills: 5 | Status: SHIPPED | OUTPATIENT
Start: 2022-09-28 | End: 2023-03-27

## 2022-09-28 NOTE — PROGRESS NOTES
[facility]  CLINIC NOTE    Patient Name: John Addison  YOB: 1955  Chief Complaint: Diabetes     PRESENTING HISTORY   History of Present Illness:  Mr. John Addison is a 67 y.o. male w/ PMH HTN, HLD, CAD (s/p PCI x 2 with LCx & RCA stents in 2016/2017), Prior STEMI, DM Type 2 who presented to the [facility] for a routine follow up.    Pt POC A1c increased from 9.2 to 12.2 today. Pt does not check CBGs daily as he does not like to stick himself. CBG checked once weekly via home health with blood sugars in 200s. Pt states that he is mostly sedentary due to leg and foot pains. Discussed diet at length and he does not follow diabetic diet. He previously cut out starches but has started eating them again. He also drinks about a 12 pack of beer once a week and does not take diabetic medications on those days. His daughter and sister sets up his medications for him daily so he is not sure of all the medications that he is taking; however, does endorse compliance overall.     Review of Systems:  12 point review of symptoms negative unless otherwise stated above    MEDICATIONS:     Current Outpatient Medications   Medication Instructions    albuterol (PROVENTIL/VENTOLIN HFA) 90 mcg/actuation inhaler 2 puffs, Inhalation, As needed (PRN)    aspirin 81 mg, Oral, Daily    atorvastatin (LIPITOR) 80 mg, Oral, Daily    canagliflozin (INVOKANA) 300 mg, Oral, Daily    clopidogreL (PLAVIX) 75 mg, Oral, Daily    cromolyn (OPTICROM) 4 % ophthalmic solution 1 drop    EASY TOUCH TWIST LANCETS 33 gauge Misc USE ONE TWICE DAILY    famotidine (PEPCID) 40 mg, Oral, 2 times daily    fluticasone propionate (FLONASE) 50 mcg/actuation nasal spray 1 spray, Each Nostril, As needed (PRN)    fluticasone-salmeterol 230-21 mcg/dose (ADVAIR HFA) 230-21 mcg/actuation HFAA inhaler 1 puff, Inhalation, 2 times daily, Controller    glipiZIDE (GLUCOTROL) 10 mg, Oral, 2 times daily    lisinopriL 10 mg, Oral, Daily  "   metFORMIN (GLUCOPHAGE) 1,000 mg, Oral, 2 times daily    metoprolol tartrate (LOPRESSOR) 50 mg, Oral, 2 times daily    TRESIBA FLEXTOUCH U-200 56 Units, Subcutaneous, Daily    TRUE METRIX GLUCOSE METER Misc USE ONE TWICE DAILY    TRULICITY 3 mg, Subcutaneous, Every 7 days        OBJECTIVE:   Vital Signs:  Vitals:    09/28/22 0850   BP: 118/80   Pulse: 105   Resp: 16   Temp: 97.8 °F (36.6 °C)   TempSrc: Oral   Weight: 116.8 kg (257 lb 8 oz)   Height: 5' 10.8" (1.798 m)        Physical Exam:  General: Awake, alert, & oriented to person, place & time. No acute distress  Psychiatric: Mood and affect normal  HEENT: Normocephalic, atraumatic. Face symmetric.  Cardiovascular: Regular rate & rhythm. Normal S1 & S2 w/out murmurs, rubs or gallops.  No JVD appreciated.  2+ pulses throughout.  Pulmonary: Bilateral symmetric chest rise. Non-labored, no wheezes, rhonchi or crackles are appreciated.  Abdominal:  Soft, nontender, nondistended. Bowel sounds present  Extremities: No clubbing, cyanosis or edema.  Skin:  Exposed skin is warm & dry.  Neuro:   Patient moves all extremities equally. Sensation intact bilateraly.      ASSESSMENT & PLAN:     Uncontrolled type 2 diabetes mellitus:  -Hemoglobin A1c of 12.2 today from 9.2 (6/2022)  -Patient is currently on metformin 1000 b.i.d., glipizide 10mg b.i.d., Invokana 300 mg, Trulicity 1.5 qweek and Tresiba 44 units daily  -Updated pt MAR.   -Continue glipizide 10mg BID, metofromin 1000mg BID, and Invokana 300mg   -Increase Trulicity to 3 qWeekly  -Increase Tresiba to 55 units daily.  -Wound care referral sent for diabetic foot care/overgrown toenails.   -Discussed goals of care with pt and he has agreed to diet modifications cutting down on starches as well as moving around more. Also spoke with him about bringing list of medications to next visit. Pt agreed to this.       RTC in 4 months.     Eligio Donald MD  PGY-3, Internal Medicine      "

## 2022-10-10 ENCOUNTER — HOSPITAL ENCOUNTER (OUTPATIENT)
Dept: RADIOLOGY | Facility: HOSPITAL | Age: 67
Discharge: HOME | End: 2022-10-10
Attending: INTERNAL MEDICINE
Payer: MEDICARE

## 2022-10-10 ENCOUNTER — HOSPITAL ENCOUNTER (OUTPATIENT)
Dept: WOUND CARE | Facility: HOSPITAL | Age: 67
Discharge: HOME | End: 2022-10-10
Attending: INTERNAL MEDICINE
Payer: MEDICARE

## 2022-10-10 VITALS — HEART RATE: 56 BPM | SYSTOLIC BLOOD PRESSURE: 142 MMHG | DIASTOLIC BLOOD PRESSURE: 86 MMHG | RESPIRATION RATE: 20 BRPM

## 2022-10-10 DIAGNOSIS — B35.1 ONYCHOMYCOSIS OF MULTIPLE TOENAILS WITH TYPE 2 DIABETES MELLITUS: Primary | ICD-10-CM

## 2022-10-10 DIAGNOSIS — L60.2 OVERGROWN TOENAILS: ICD-10-CM

## 2022-10-10 DIAGNOSIS — L60.3 DYSTROPHIC NAIL: ICD-10-CM

## 2022-10-10 DIAGNOSIS — M79.604 PAIN IN BOTH LOWER EXTREMITIES: ICD-10-CM

## 2022-10-10 DIAGNOSIS — M79.605 PAIN IN BOTH LOWER EXTREMITIES: ICD-10-CM

## 2022-10-10 DIAGNOSIS — S91.209A AVULSION OF TOENAIL OF LEFT FOOT: ICD-10-CM

## 2022-10-10 DIAGNOSIS — L85.3 XEROSIS OF SKIN: ICD-10-CM

## 2022-10-10 DIAGNOSIS — Z72.0 TOBACCO ABUSE: ICD-10-CM

## 2022-10-10 DIAGNOSIS — E11.69 ONYCHOMYCOSIS OF MULTIPLE TOENAILS WITH TYPE 2 DIABETES MELLITUS: Primary | ICD-10-CM

## 2022-10-10 PROCEDURE — 99213 OFFICE O/P EST LOW 20 MIN: CPT | Mod: 25,,, | Performed by: NURSE PRACTITIONER

## 2022-10-10 PROCEDURE — 11730 AVULSION NAIL PLATE SIMPLE 1: CPT

## 2022-10-10 PROCEDURE — 11721 DEBRIDE NAIL 6 OR MORE: CPT | Mod: 59

## 2022-10-10 PROCEDURE — 99211 OFF/OP EST MAY X REQ PHY/QHP: CPT | Mod: 25

## 2022-10-10 PROCEDURE — 93925 LOWER EXTREMITY STUDY: CPT

## 2022-10-10 PROCEDURE — 99213 PR OFFICE/OUTPT VISIT, EST, LEVL III, 20-29 MIN: ICD-10-PCS | Mod: 25,,, | Performed by: NURSE PRACTITIONER

## 2022-10-10 RX ORDER — AMMONIUM LACTATE 12 G/100G
LOTION TOPICAL
Qty: 225 G | Refills: 2 | Status: ON HOLD | OUTPATIENT
Start: 2022-10-10 | End: 2023-06-14 | Stop reason: HOSPADM

## 2022-10-10 NOTE — PROGRESS NOTES
Subjective:       Patient ID: John Addison is a 67 y.o. male.    Chief Complaint: DFC    66 y/o male presents to wound care clinic for DFC a referral from his PCP. Medical History: DM, CAD, HTN, HLD, Pain to bilateral lower exts, and Tobacco user.     US of Bilateral Lower Ext 10/10/22:    The right lower extremity demonstrated mild multilevel plaque and multiphasic waveforms at all levels with the exception of the distal PTA.  The SANIA on the right correlates with a moderate obstruction.  The right lower extremity demonstrated moderate arterial flow reduction.  The left lower extremity demonstrated a greater than 70% stenosis at the level of the proximal SFA with an associated loss of multiphasic waveforms.  The SANIA on the left correlates with a moderately severe obstruction.  The left lower extremity demonstrated moderately severe arterial flow reduction.    Today's Visit 10/10/22:   Trimmed all ten dystrophic toenails using podiatry clippers, filed with [place] board, debulked with Daniela. During toenail clipper left 5th toe nail avulsion with minimal bleeding. Denies pain.  Bilateral feet dry, cracked, pt voiced he does not wash feet because he can not reach. Instructed the importance of checking feet daily and washing keep between toes dry. He will have his sister assist with application of Lac Hydrin and Vaseline. Will have him return in 2 weeks to reevaluate toenails. Instructed to removed dressing to left foot 5th toe in 24 hours, and to report any issue with toenails or discoloration. Verbalized understanding of all teachings.     Lab Results   Component Value Date/Time    WBC 10.0 08/15/2022 04:26 PM    RBC 5.77 08/15/2022 04:26 PM    HGB 15.9 08/15/2022 04:26 PM    HCT 45.2 08/15/2022 04:26 PM    MCV 78.3 (L) 08/15/2022 04:26 PM    MCH 27.6 08/15/2022 04:26 PM    CREATININE 1.06 08/15/2022 04:26 PM    HGBA1C 12.2 09/28/2022 08:54 AM     Review of Systems   All other systems reviewed and are  negative.        Objective:        Physical Exam  Vitals reviewed.   Cardiovascular:      Rate and Rhythm: Normal rate.      Pulses:           Dorsalis pedis pulses are detected w/ Doppler on the right side and detected w/ Doppler on the left side.        Posterior tibial pulses are detected w/ Doppler on the right side and detected w/ Doppler on the left side.   Musculoskeletal:      Cervical back: Normal range of motion.        Feet:    Feet:      Right foot:      Skin integrity: Dry skin present.      Toenail Condition: Right toenails are abnormally thick and long. Fungal disease present.     Left foot:      Skin integrity: Dry skin present.      Toenail Condition: Left toenails are abnormally thick and long. Fungal disease present.     Comments: Bilateral dystrophic toenails.     Xerosis of bilateral feet.  Skin:     General: Skin is warm and dry.      Capillary Refill: Capillary refill takes less than 2 seconds.   Neurological:      Mental Status: He is alert.            Assessment:         ICD-10-CM ICD-9-CM   1. Onychomycosis of multiple toenails with type 2 diabetes mellitus  E11.69 250.80    B35.1 110.1   2. Xerosis of skin  L85.3 706.8   3. Overgrown toenails  L60.2 703.8   4. Dystrophic nail  L60.3 703.8   5. Avulsion of toenail of left foot  S91.209A 893.0   6. Uncontrolled type 2 diabetes mellitus with nephropathy  [id number] 250.42     583.81   7. Pain in both lower extremities  M79.604 729.5    M79.605    8. Tobacco abuse  Z72.0 305.1         Plan:   Tissue pathology and/or culture taken:  [] Yes [x] No   Sharp debridement performed:   [] Yes [x] No   Labs ordered this visit:   [] Yes [x] No   Imaging ordered this visit:   [] Yes [x] No         1. Onychomycosis of multiple toenails with type 2 diabetes mellitus     Trimmed all ten. Tolerated well.       2. Xerosis of skin     Prescribed Lac Hytrin and Vaseline    3. Overgrown toenails     Trimmed all ten toenails and 5th toe avulsion   4. Dystrophic nail      Trimmed all ten toenails.   5. Avulsion of toenail of left foot    Performed using nail trimmed 5th toenails removed without any pain.     6.     Uncontrolled type 2 diabetes mellitus with nephropathy             Co-factor in development of dystrophic nails and xerosis of skin.        7. Pain in both lower extremities     Had US done today.      8. Tobacco abuse     Instructed on smoking cessation and dangers of smoking.               Follow up in about 2 weeks (around 10/24/2022) for Follow up to reevaluate left foot 5th toe..

## 2022-10-11 LAB
LEFT ANT TIBIAL SYS PSV: 11 CM/S
LEFT CFA PSV: 70 CM/S
LEFT DORSALIS PEDIS PSV: 12 CM/S
LEFT POPLITEAL PSV: 27 CM/S
LEFT POST TIBIAL SYS PSV: 18 CM/S
LEFT PROFUNDA SYS PSV: 173 CM/S
LEFT SUPER FEMORAL DIST SYS PSV: 36 CM/S
LEFT SUPER FEMORAL MID SYS PSV: 15 CM/S
LEFT SUPER FEMORAL PROX SYS PSV: 469 CM/S
OHS CV LEFT LOWER EXTREMITY ABI (NO CALC): 0.61
OHS CV RIGHT ABI LOWER EXTREMITY (NO CALC): 0.89
RIGHT ANT TIBIAL SYS PSV: 25 CM/S
RIGHT CFA PSV: 123 CM/S
RIGHT DORSALIS PEDIS PSV: 22 CM/S
RIGHT POPLITEAL PSV: 49 CM/S
RIGHT POST TIBIAL SYS PSV: 19 CM/S
RIGHT PROFUNDA SYS PSV: 101 CM/S
RIGHT SUPER FEMORAL DIST SYS PSV: 58 CM/S
RIGHT SUPER FEMORAL MID SYS PSV: 97 CM/S
RIGHT SUPER FEMORAL PROX SYS PSV: 67 CM/S

## 2022-10-28 NOTE — PROCEDURES
Fibroscan Procedure     Name: John Addison  Date of Procedure :   Interpreting Physician: Julee Alcazar MD, MPH  Diagnosis: NAFLD    Probe: XL    Fibroscan readin.1 kPa    Fibrosis: F 0-1     CAP readin dB/m    Steatosis: S3      Miscellaneous:

## 2023-02-08 ENCOUNTER — PATIENT MESSAGE (OUTPATIENT)
Dept: ADMINISTRATIVE | Facility: HOSPITAL | Age: 68
End: 2023-02-08
Payer: MEDICARE

## 2023-05-05 ENCOUNTER — TELEPHONE (OUTPATIENT)
Dept: INTERNAL MEDICINE | Facility: CLINIC | Age: 68
End: 2023-05-05
Payer: MEDICARE

## 2023-05-05 NOTE — TELEPHONE ENCOUNTER
Bhargavi from [facility] stating that when she went visit the pt he informed her that he has a cough for four days and cough type symptoms. She also stated that he was wheezing and has crackling to the L lower lobe is worse than R, asking if something can be called into his pharmacy

## 2023-05-10 ENCOUNTER — ANESTHESIA (OUTPATIENT)
Dept: SURGERY | Facility: HOSPITAL | Age: 68
DRG: 854 | End: 2023-05-10
Payer: MEDICARE

## 2023-05-10 ENCOUNTER — HOSPITAL ENCOUNTER (INPATIENT)
Facility: HOSPITAL | Age: 68
LOS: 6 days | Discharge: NURSING, REHAB OR LONG-TERM CARE | DRG: 854 | End: 2023-05-16
Attending: EMERGENCY MEDICINE | Admitting: STUDENT IN AN ORGANIZED HEALTH CARE EDUCATION/TRAINING PROGRAM
Payer: MEDICARE

## 2023-05-10 ENCOUNTER — ANESTHESIA EVENT (OUTPATIENT)
Dept: SURGERY | Facility: HOSPITAL | Age: 68
DRG: 854 | End: 2023-05-10
Payer: MEDICARE

## 2023-05-10 DIAGNOSIS — E11.40 TYPE 2 DIABETES MELLITUS WITH DIABETIC NEUROPATHY, WITHOUT LONG-TERM CURRENT USE OF INSULIN: ICD-10-CM

## 2023-05-10 DIAGNOSIS — A41.9 SEPSIS: ICD-10-CM

## 2023-05-10 DIAGNOSIS — F10.10 ETOH ABUSE: ICD-10-CM

## 2023-05-10 DIAGNOSIS — N49.3 FOURNIER'S GANGRENE: Primary | ICD-10-CM

## 2023-05-10 LAB
ABORH RETYPE: NORMAL
ALBUMIN SERPL-MCNC: 1.9 G/DL (ref 3.4–4.8)
ALBUMIN SERPL-MCNC: 2.4 G/DL (ref 3.4–4.8)
ALBUMIN/GLOB SERPL: 0.4 RATIO (ref 1.1–2)
ALBUMIN/GLOB SERPL: 0.4 RATIO (ref 1.1–2)
ALP SERPL-CCNC: 133 UNIT/L (ref 40–150)
ALP SERPL-CCNC: 86 UNIT/L (ref 40–150)
ALT SERPL-CCNC: 21 UNIT/L (ref 0–55)
ALT SERPL-CCNC: 25 UNIT/L (ref 0–55)
AST SERPL-CCNC: 23 UNIT/L (ref 5–34)
AST SERPL-CCNC: 26 UNIT/L (ref 5–34)
BASOPHILS # BLD AUTO: 0.09 X10(3)/MCL
BASOPHILS NFR BLD AUTO: 0.4 %
BILIRUBIN DIRECT+TOT PNL SERPL-MCNC: 0.8 MG/DL
BILIRUBIN DIRECT+TOT PNL SERPL-MCNC: 1.1 MG/DL
BNP BLD-MCNC: 23.9 PG/ML
BUN SERPL-MCNC: 31.2 MG/DL (ref 8.4–25.7)
BUN SERPL-MCNC: 32.6 MG/DL (ref 8.4–25.7)
CALCIUM SERPL-MCNC: 10.3 MG/DL (ref 8.8–10)
CALCIUM SERPL-MCNC: 9 MG/DL (ref 8.8–10)
CHLORIDE SERPL-SCNC: 100 MMOL/L (ref 98–107)
CHLORIDE SERPL-SCNC: 105 MMOL/L (ref 98–107)
CO2 SERPL-SCNC: 18 MMOL/L (ref 23–31)
CO2 SERPL-SCNC: 22 MMOL/L (ref 23–31)
CREAT SERPL-MCNC: 1.2 MG/DL (ref 0.73–1.18)
CREAT SERPL-MCNC: 1.68 MG/DL (ref 0.73–1.18)
EOSINOPHIL # BLD AUTO: 0.03 X10(3)/MCL (ref 0–0.9)
EOSINOPHIL NFR BLD AUTO: 0.1 %
ERYTHROCYTE [DISTWIDTH] IN BLOOD BY AUTOMATED COUNT: 15.9 % (ref 11.5–17)
ERYTHROCYTE [DISTWIDTH] IN BLOOD BY AUTOMATED COUNT: 16 % (ref 11.5–17)
EST. AVERAGE GLUCOSE BLD GHB EST-MCNC: 231.7 MG/DL
GFR SERPLBLD CREATININE-BSD FMLA CKD-EPI: 44 MLS/MIN/1.73/M2
GFR SERPLBLD CREATININE-BSD FMLA CKD-EPI: >60 MLS/MIN/1.73/M2
GLOBULIN SER-MCNC: 4.9 GM/DL (ref 2.4–3.5)
GLOBULIN SER-MCNC: 6 GM/DL (ref 2.4–3.5)
GLUCOSE SERPL-MCNC: 174 MG/DL (ref 82–115)
GLUCOSE SERPL-MCNC: 295 MG/DL (ref 82–115)
GROUP & RH: NORMAL
HBA1C MFR BLD: 9.7 %
HCT VFR BLD AUTO: 35.2 % (ref 42–52)
HCT VFR BLD AUTO: 41.5 % (ref 42–52)
HGB BLD-MCNC: 12.1 G/DL (ref 14–18)
HGB BLD-MCNC: 14.1 G/DL (ref 14–18)
IMM GRANULOCYTES # BLD AUTO: 0.25 X10(3)/MCL (ref 0–0.04)
IMM GRANULOCYTES NFR BLD AUTO: 1 %
INDIRECT COOMBS GEL: NORMAL
LACTATE SERPL-SCNC: 1.1 MMOL/L (ref 0.5–2.2)
LACTATE SERPL-SCNC: 2.2 MMOL/L (ref 0.5–2.2)
LYMPHOCYTES # BLD AUTO: 1.92 X10(3)/MCL (ref 0.6–4.6)
LYMPHOCYTES NFR BLD AUTO: 7.8 %
MCH RBC QN AUTO: 26.8 PG (ref 27–31)
MCH RBC QN AUTO: 26.9 PG (ref 27–31)
MCHC RBC AUTO-ENTMCNC: 34 G/DL (ref 33–36)
MCHC RBC AUTO-ENTMCNC: 34.4 G/DL (ref 33–36)
MCV RBC AUTO: 78.4 FL (ref 80–94)
MCV RBC AUTO: 78.9 FL (ref 80–94)
MONOCYTES # BLD AUTO: 1.72 X10(3)/MCL (ref 0.1–1.3)
MONOCYTES NFR BLD AUTO: 6.9 %
NEUTROPHILS # BLD AUTO: 20.75 X10(3)/MCL (ref 2.1–9.2)
NEUTROPHILS NFR BLD AUTO: 83.8 %
NRBC BLD AUTO-RTO: 0 %
NRBC BLD AUTO-RTO: 0 %
PLATELET # BLD AUTO: 231 X10(3)/MCL (ref 130–400)
PLATELET # BLD AUTO: 284 X10(3)/MCL (ref 130–400)
PMV BLD AUTO: 10 FL (ref 7.4–10.4)
PMV BLD AUTO: 10.8 FL (ref 7.4–10.4)
POCT GLUCOSE: 270 MG/DL (ref 70–110)
POCT GLUCOSE: 324 MG/DL (ref 70–110)
POTASSIUM SERPL-SCNC: 3.8 MMOL/L (ref 3.5–5.1)
POTASSIUM SERPL-SCNC: 4.1 MMOL/L (ref 3.5–5.1)
PROT SERPL-MCNC: 6.8 GM/DL (ref 5.8–7.6)
PROT SERPL-MCNC: 8.4 GM/DL (ref 5.8–7.6)
RBC # BLD AUTO: 4.49 X10(6)/MCL (ref 4.7–6.1)
RBC # BLD AUTO: 5.26 X10(6)/MCL (ref 4.7–6.1)
SODIUM SERPL-SCNC: 133 MMOL/L (ref 136–145)
SODIUM SERPL-SCNC: 136 MMOL/L (ref 136–145)
SPECIMEN OUTDATE: NORMAL
WBC # SPEC AUTO: 24.76 X10(3)/MCL (ref 4.5–11.5)
WBC # SPEC AUTO: 27.11 X10(3)/MCL (ref 4.5–11.5)

## 2023-05-10 PROCEDURE — 36000706: Performed by: STUDENT IN AN ORGANIZED HEALTH CARE EDUCATION/TRAINING PROGRAM

## 2023-05-10 PROCEDURE — 63600175 PHARM REV CODE 636 W HCPCS

## 2023-05-10 PROCEDURE — 63600175 PHARM REV CODE 636 W HCPCS: Performed by: SPECIALIST

## 2023-05-10 PROCEDURE — 25000242 PHARM REV CODE 250 ALT 637 W/ HCPCS

## 2023-05-10 PROCEDURE — 25000003 PHARM REV CODE 250: Performed by: STUDENT IN AN ORGANIZED HEALTH CARE EDUCATION/TRAINING PROGRAM

## 2023-05-10 PROCEDURE — 82962 GLUCOSE BLOOD TEST: CPT

## 2023-05-10 PROCEDURE — 63600175 PHARM REV CODE 636 W HCPCS: Performed by: STUDENT IN AN ORGANIZED HEALTH CARE EDUCATION/TRAINING PROGRAM

## 2023-05-10 PROCEDURE — 87088 URINE BACTERIA CULTURE: CPT | Performed by: EMERGENCY MEDICINE

## 2023-05-10 PROCEDURE — 93005 ELECTROCARDIOGRAM TRACING: CPT

## 2023-05-10 PROCEDURE — 37000008 HC ANESTHESIA 1ST 15 MINUTES: Performed by: STUDENT IN AN ORGANIZED HEALTH CARE EDUCATION/TRAINING PROGRAM

## 2023-05-10 PROCEDURE — 25000003 PHARM REV CODE 250: Performed by: NURSE ANESTHETIST, CERTIFIED REGISTERED

## 2023-05-10 PROCEDURE — 87075 CULTR BACTERIA EXCEPT BLOOD: CPT | Performed by: STUDENT IN AN ORGANIZED HEALTH CARE EDUCATION/TRAINING PROGRAM

## 2023-05-10 PROCEDURE — D9220A PRA ANESTHESIA: ICD-10-PCS | Mod: ANES,,, | Performed by: SPECIALIST

## 2023-05-10 PROCEDURE — 37000009 HC ANESTHESIA EA ADD 15 MINS: Performed by: STUDENT IN AN ORGANIZED HEALTH CARE EDUCATION/TRAINING PROGRAM

## 2023-05-10 PROCEDURE — 36620 ARTERIAL: ICD-10-PCS | Mod: 59,,, | Performed by: SPECIALIST

## 2023-05-10 PROCEDURE — 83880 ASSAY OF NATRIURETIC PEPTIDE: CPT | Performed by: EMERGENCY MEDICINE

## 2023-05-10 PROCEDURE — 99285 EMERGENCY DEPT VISIT HI MDM: CPT | Mod: 25

## 2023-05-10 PROCEDURE — 87070 CULTURE OTHR SPECIMN AEROBIC: CPT | Performed by: STUDENT IN AN ORGANIZED HEALTH CARE EDUCATION/TRAINING PROGRAM

## 2023-05-10 PROCEDURE — 80053 COMPREHEN METABOLIC PANEL: CPT | Performed by: STUDENT IN AN ORGANIZED HEALTH CARE EDUCATION/TRAINING PROGRAM

## 2023-05-10 PROCEDURE — 83036 HEMOGLOBIN GLYCOSYLATED A1C: CPT | Performed by: STUDENT IN AN ORGANIZED HEALTH CARE EDUCATION/TRAINING PROGRAM

## 2023-05-10 PROCEDURE — 87205 SMEAR GRAM STAIN: CPT | Performed by: STUDENT IN AN ORGANIZED HEALTH CARE EDUCATION/TRAINING PROGRAM

## 2023-05-10 PROCEDURE — 85027 COMPLETE CBC AUTOMATED: CPT | Performed by: STUDENT IN AN ORGANIZED HEALTH CARE EDUCATION/TRAINING PROGRAM

## 2023-05-10 PROCEDURE — 96360 HYDRATION IV INFUSION INIT: CPT

## 2023-05-10 PROCEDURE — 86920 COMPATIBILITY TEST SPIN: CPT | Performed by: STUDENT IN AN ORGANIZED HEALTH CARE EDUCATION/TRAINING PROGRAM

## 2023-05-10 PROCEDURE — 85025 COMPLETE CBC W/AUTO DIFF WBC: CPT | Performed by: EMERGENCY MEDICINE

## 2023-05-10 PROCEDURE — 25000003 PHARM REV CODE 250

## 2023-05-10 PROCEDURE — D9220A PRA ANESTHESIA: ICD-10-PCS | Mod: CRNA,,, | Performed by: NURSE ANESTHETIST, CERTIFIED REGISTERED

## 2023-05-10 PROCEDURE — 81001 URINALYSIS AUTO W/SCOPE: CPT | Performed by: EMERGENCY MEDICINE

## 2023-05-10 PROCEDURE — D9220A PRA ANESTHESIA: Mod: ANES,,, | Performed by: SPECIALIST

## 2023-05-10 PROCEDURE — 63600175 PHARM REV CODE 636 W HCPCS: Performed by: EMERGENCY MEDICINE

## 2023-05-10 PROCEDURE — 71000033 HC RECOVERY, INTIAL HOUR: Performed by: STUDENT IN AN ORGANIZED HEALTH CARE EDUCATION/TRAINING PROGRAM

## 2023-05-10 PROCEDURE — 63600175 PHARM REV CODE 636 W HCPCS: Performed by: NURSE ANESTHETIST, CERTIFIED REGISTERED

## 2023-05-10 PROCEDURE — 36000707: Performed by: STUDENT IN AN ORGANIZED HEALTH CARE EDUCATION/TRAINING PROGRAM

## 2023-05-10 PROCEDURE — 25000242 PHARM REV CODE 250 ALT 637 W/ HCPCS: Performed by: NURSE ANESTHETIST, CERTIFIED REGISTERED

## 2023-05-10 PROCEDURE — 87040 BLOOD CULTURE FOR BACTERIA: CPT | Performed by: EMERGENCY MEDICINE

## 2023-05-10 PROCEDURE — 80053 COMPREHEN METABOLIC PANEL: CPT | Performed by: EMERGENCY MEDICINE

## 2023-05-10 PROCEDURE — 20000000 HC ICU ROOM

## 2023-05-10 PROCEDURE — 25000003 PHARM REV CODE 250: Performed by: EMERGENCY MEDICINE

## 2023-05-10 PROCEDURE — 96372 THER/PROPH/DIAG INJ SC/IM: CPT

## 2023-05-10 PROCEDURE — D9220A PRA ANESTHESIA: Mod: CRNA,,, | Performed by: NURSE ANESTHETIST, CERTIFIED REGISTERED

## 2023-05-10 PROCEDURE — 83605 ASSAY OF LACTIC ACID: CPT | Performed by: EMERGENCY MEDICINE

## 2023-05-10 PROCEDURE — 88305 TISSUE EXAM BY PATHOLOGIST: CPT | Mod: TC | Performed by: STUDENT IN AN ORGANIZED HEALTH CARE EDUCATION/TRAINING PROGRAM

## 2023-05-10 PROCEDURE — 86900 BLOOD TYPING SEROLOGIC ABO: CPT | Performed by: STUDENT IN AN ORGANIZED HEALTH CARE EDUCATION/TRAINING PROGRAM

## 2023-05-10 PROCEDURE — 36620 INSERTION CATHETER ARTERY: CPT | Mod: 59,,, | Performed by: SPECIALIST

## 2023-05-10 RX ORDER — PROCHLORPERAZINE EDISYLATE 5 MG/ML
5 INJECTION INTRAMUSCULAR; INTRAVENOUS ONCE AS NEEDED
Status: DISCONTINUED | OUTPATIENT
Start: 2023-05-10 | End: 2023-05-10 | Stop reason: HOSPADM

## 2023-05-10 RX ORDER — INSULIN ASPART 100 [IU]/ML
4-12 INJECTION, SOLUTION INTRAVENOUS; SUBCUTANEOUS
Status: DISCONTINUED | OUTPATIENT
Start: 2023-05-10 | End: 2023-05-10 | Stop reason: HOSPADM

## 2023-05-10 RX ORDER — LEVALBUTEROL INHALATION SOLUTION 1.25 MG/3ML
SOLUTION RESPIRATORY (INHALATION)
Status: COMPLETED
Start: 2023-05-10 | End: 2023-05-10

## 2023-05-10 RX ORDER — ROCURONIUM BROMIDE 10 MG/ML
INJECTION, SOLUTION INTRAVENOUS
Status: DISCONTINUED | OUTPATIENT
Start: 2023-05-10 | End: 2023-05-10

## 2023-05-10 RX ORDER — METOPROLOL TARTRATE 50 MG/1
50 TABLET ORAL 2 TIMES DAILY
Status: DISCONTINUED | OUTPATIENT
Start: 2023-05-10 | End: 2023-05-10

## 2023-05-10 RX ORDER — ACETAMINOPHEN 500 MG
1000 TABLET ORAL EVERY 8 HOURS PRN
Status: DISCONTINUED | OUTPATIENT
Start: 2023-05-10 | End: 2023-05-16 | Stop reason: HOSPADM

## 2023-05-10 RX ORDER — MEPERIDINE HYDROCHLORIDE 25 MG/ML
12.5 INJECTION INTRAMUSCULAR; INTRAVENOUS; SUBCUTANEOUS ONCE
Status: DISCONTINUED | OUTPATIENT
Start: 2023-05-10 | End: 2023-05-10 | Stop reason: HOSPADM

## 2023-05-10 RX ORDER — ONDANSETRON 4 MG/1
8 TABLET, ORALLY DISINTEGRATING ORAL EVERY 8 HOURS PRN
Status: CANCELLED | OUTPATIENT
Start: 2023-05-10

## 2023-05-10 RX ORDER — SODIUM CHLORIDE 9 MG/ML
INJECTION, SOLUTION INTRAVENOUS CONTINUOUS
Status: DISCONTINUED | OUTPATIENT
Start: 2023-05-10 | End: 2023-05-10

## 2023-05-10 RX ORDER — HYDROCODONE BITARTRATE AND ACETAMINOPHEN 500; 5 MG/1; MG/1
TABLET ORAL
Status: DISCONTINUED | OUTPATIENT
Start: 2023-05-10 | End: 2023-05-16 | Stop reason: HOSPADM

## 2023-05-10 RX ORDER — SODIUM CHLORIDE, SODIUM LACTATE, POTASSIUM CHLORIDE, CALCIUM CHLORIDE 600; 310; 30; 20 MG/100ML; MG/100ML; MG/100ML; MG/100ML
INJECTION, SOLUTION INTRAVENOUS CONTINUOUS
Status: DISCONTINUED | OUTPATIENT
Start: 2023-05-10 | End: 2023-05-12

## 2023-05-10 RX ORDER — HEPARIN SODIUM 5000 [USP'U]/ML
INJECTION, SOLUTION INTRAVENOUS; SUBCUTANEOUS
Status: COMPLETED
Start: 2023-05-10 | End: 2023-05-10

## 2023-05-10 RX ORDER — LIDOCAINE HYDROCHLORIDE 10 MG/ML
1 INJECTION, SOLUTION EPIDURAL; INFILTRATION; INTRACAUDAL; PERINEURAL ONCE AS NEEDED
Status: DISCONTINUED | OUTPATIENT
Start: 2023-05-10 | End: 2023-05-16 | Stop reason: HOSPADM

## 2023-05-10 RX ORDER — INSULIN ASPART 100 [IU]/ML
INJECTION, SOLUTION INTRAVENOUS; SUBCUTANEOUS
Status: DISPENSED
Start: 2023-05-10 | End: 2023-05-11

## 2023-05-10 RX ORDER — HYDROMORPHONE HYDROCHLORIDE 1 MG/ML
1 INJECTION, SOLUTION INTRAMUSCULAR; INTRAVENOUS; SUBCUTANEOUS
Status: DISCONTINUED | OUTPATIENT
Start: 2023-05-10 | End: 2023-05-16 | Stop reason: HOSPADM

## 2023-05-10 RX ORDER — SUCCINYLCHOLINE CHLORIDE 20 MG/ML
INJECTION INTRAMUSCULAR; INTRAVENOUS
Status: DISCONTINUED | OUTPATIENT
Start: 2023-05-10 | End: 2023-05-10

## 2023-05-10 RX ORDER — SODIUM CHLORIDE, SODIUM LACTATE, POTASSIUM CHLORIDE, CALCIUM CHLORIDE 600; 310; 30; 20 MG/100ML; MG/100ML; MG/100ML; MG/100ML
1000 INJECTION, SOLUTION INTRAVENOUS CONTINUOUS
Status: DISCONTINUED | OUTPATIENT
Start: 2023-05-10 | End: 2023-05-10

## 2023-05-10 RX ORDER — OXYCODONE HYDROCHLORIDE 5 MG/1
5 TABLET ORAL EVERY 6 HOURS PRN
Status: DISCONTINUED | OUTPATIENT
Start: 2023-05-10 | End: 2023-05-16 | Stop reason: HOSPADM

## 2023-05-10 RX ORDER — MUPIROCIN 20 MG/G
OINTMENT TOPICAL 2 TIMES DAILY
Status: COMPLETED | OUTPATIENT
Start: 2023-05-10 | End: 2023-05-15

## 2023-05-10 RX ORDER — SODIUM CITRATE AND CITRIC ACID MONOHYDRATE 334; 500 MG/5ML; MG/5ML
30 SOLUTION ORAL ONCE
Status: COMPLETED | OUTPATIENT
Start: 2023-05-10 | End: 2023-05-10

## 2023-05-10 RX ORDER — ONDANSETRON 2 MG/ML
4 INJECTION INTRAMUSCULAR; INTRAVENOUS ONCE
Status: DISCONTINUED | OUTPATIENT
Start: 2023-05-10 | End: 2023-05-10 | Stop reason: HOSPADM

## 2023-05-10 RX ORDER — ACETAMINOPHEN 160 MG/5ML
650 SOLUTION ORAL EVERY 6 HOURS PRN
Status: DISCONTINUED | OUTPATIENT
Start: 2023-05-10 | End: 2023-05-16 | Stop reason: HOSPADM

## 2023-05-10 RX ORDER — LIDOCAINE HYDROCHLORIDE 20 MG/ML
INJECTION INTRAVENOUS
Status: DISCONTINUED | OUTPATIENT
Start: 2023-05-10 | End: 2023-05-10

## 2023-05-10 RX ORDER — ACETAMINOPHEN 325 MG/1
650 TABLET ORAL EVERY 8 HOURS PRN
Status: CANCELLED | OUTPATIENT
Start: 2023-05-10

## 2023-05-10 RX ORDER — FAMOTIDINE 10 MG/ML
INJECTION INTRAVENOUS
Status: COMPLETED
Start: 2023-05-10 | End: 2023-05-10

## 2023-05-10 RX ORDER — MIDAZOLAM HYDROCHLORIDE 1 MG/ML
2 INJECTION INTRAMUSCULAR; INTRAVENOUS ONCE AS NEEDED
Status: DISCONTINUED | OUTPATIENT
Start: 2023-05-10 | End: 2023-05-10 | Stop reason: HOSPADM

## 2023-05-10 RX ORDER — ACETAMINOPHEN 325 MG/1
650 TABLET ORAL EVERY 8 HOURS PRN
Status: DISCONTINUED | OUTPATIENT
Start: 2023-05-10 | End: 2023-05-10

## 2023-05-10 RX ORDER — FAMOTIDINE 10 MG/ML
20 INJECTION INTRAVENOUS ONCE
Status: COMPLETED | OUTPATIENT
Start: 2023-05-10 | End: 2023-05-10

## 2023-05-10 RX ORDER — SODIUM CHLORIDE, SODIUM LACTATE, POTASSIUM CHLORIDE, CALCIUM CHLORIDE 600; 310; 30; 20 MG/100ML; MG/100ML; MG/100ML; MG/100ML
INJECTION, SOLUTION INTRAVENOUS CONTINUOUS
Status: DISCONTINUED | OUTPATIENT
Start: 2023-05-10 | End: 2023-05-10

## 2023-05-10 RX ORDER — HEPARIN SODIUM 5000 [USP'U]/ML
5000 INJECTION, SOLUTION INTRAVENOUS; SUBCUTANEOUS EVERY 8 HOURS
Status: DISCONTINUED | OUTPATIENT
Start: 2023-05-10 | End: 2023-05-12

## 2023-05-10 RX ORDER — ONDANSETRON 2 MG/ML
INJECTION INTRAMUSCULAR; INTRAVENOUS
Status: DISCONTINUED | OUTPATIENT
Start: 2023-05-10 | End: 2023-05-10

## 2023-05-10 RX ORDER — GLUCAGON 1 MG
1 KIT INJECTION
Status: DISCONTINUED | OUTPATIENT
Start: 2023-05-10 | End: 2023-05-16 | Stop reason: HOSPADM

## 2023-05-10 RX ORDER — INSULIN ASPART 100 [IU]/ML
0-15 INJECTION, SOLUTION INTRAVENOUS; SUBCUTANEOUS EVERY 6 HOURS PRN
Status: DISCONTINUED | OUTPATIENT
Start: 2023-05-10 | End: 2023-05-15

## 2023-05-10 RX ORDER — ALBUTEROL SULFATE 90 UG/1
AEROSOL, METERED RESPIRATORY (INHALATION)
Status: DISCONTINUED | OUTPATIENT
Start: 2023-05-10 | End: 2023-05-10

## 2023-05-10 RX ORDER — OXYCODONE AND ACETAMINOPHEN 5; 325 MG/1; MG/1
2 TABLET ORAL ONCE
Status: DISCONTINUED | OUTPATIENT
Start: 2023-05-10 | End: 2023-05-10 | Stop reason: HOSPADM

## 2023-05-10 RX ORDER — HYDROMORPHONE HYDROCHLORIDE 1 MG/ML
0.2 INJECTION, SOLUTION INTRAMUSCULAR; INTRAVENOUS; SUBCUTANEOUS EVERY 5 MIN PRN
Status: DISCONTINUED | OUTPATIENT
Start: 2023-05-10 | End: 2023-05-10 | Stop reason: HOSPADM

## 2023-05-10 RX ORDER — HYDROMORPHONE HYDROCHLORIDE 1 MG/ML
1 INJECTION, SOLUTION INTRAMUSCULAR; INTRAVENOUS; SUBCUTANEOUS EVERY 4 HOURS PRN
Status: DISCONTINUED | OUTPATIENT
Start: 2023-05-10 | End: 2023-05-10

## 2023-05-10 RX ORDER — SODIUM CHLORIDE 0.9 % (FLUSH) 0.9 %
10 SYRINGE (ML) INJECTION
Status: DISCONTINUED | OUTPATIENT
Start: 2023-05-10 | End: 2023-05-16 | Stop reason: HOSPADM

## 2023-05-10 RX ORDER — INSULIN ASPART 100 [IU]/ML
INJECTION, SOLUTION INTRAVENOUS; SUBCUTANEOUS
Status: COMPLETED
Start: 2023-05-10 | End: 2023-05-10

## 2023-05-10 RX ORDER — TALC
6 POWDER (GRAM) TOPICAL NIGHTLY PRN
Status: DISCONTINUED | OUTPATIENT
Start: 2023-05-10 | End: 2023-05-16 | Stop reason: HOSPADM

## 2023-05-10 RX ORDER — PROPOFOL 10 MG/ML
VIAL (ML) INTRAVENOUS
Status: DISCONTINUED | OUTPATIENT
Start: 2023-05-10 | End: 2023-05-10

## 2023-05-10 RX ORDER — TALC
6 POWDER (GRAM) TOPICAL NIGHTLY PRN
Status: CANCELLED | OUTPATIENT
Start: 2023-05-10

## 2023-05-10 RX ORDER — SODIUM CITRATE AND CITRIC ACID MONOHYDRATE 334; 500 MG/5ML; MG/5ML
SOLUTION ORAL
Status: COMPLETED
Start: 2023-05-10 | End: 2023-05-10

## 2023-05-10 RX ORDER — DIPHENHYDRAMINE HYDROCHLORIDE 50 MG/ML
25 INJECTION INTRAMUSCULAR; INTRAVENOUS ONCE AS NEEDED
Status: DISCONTINUED | OUTPATIENT
Start: 2023-05-10 | End: 2023-05-10 | Stop reason: HOSPADM

## 2023-05-10 RX ORDER — ONDANSETRON 4 MG/1
8 TABLET, ORALLY DISINTEGRATING ORAL EVERY 8 HOURS PRN
Status: DISCONTINUED | OUTPATIENT
Start: 2023-05-10 | End: 2023-05-16 | Stop reason: HOSPADM

## 2023-05-10 RX ORDER — HYDROMORPHONE HYDROCHLORIDE 1 MG/ML
0.5 INJECTION, SOLUTION INTRAMUSCULAR; INTRAVENOUS; SUBCUTANEOUS EVERY 5 MIN PRN
Status: DISCONTINUED | OUTPATIENT
Start: 2023-05-10 | End: 2023-05-10 | Stop reason: HOSPADM

## 2023-05-10 RX ORDER — FENTANYL CITRATE 50 UG/ML
INJECTION, SOLUTION INTRAMUSCULAR; INTRAVENOUS
Status: DISCONTINUED | OUTPATIENT
Start: 2023-05-10 | End: 2023-05-10

## 2023-05-10 RX ORDER — LIDOCAINE HYDROCHLORIDE 10 MG/ML
1 INJECTION, SOLUTION EPIDURAL; INFILTRATION; INTRACAUDAL; PERINEURAL ONCE AS NEEDED
Status: CANCELLED | OUTPATIENT
Start: 2023-05-10 | End: 2034-10-06

## 2023-05-10 RX ORDER — HYDROMORPHONE HYDROCHLORIDE 1 MG/ML
INJECTION, SOLUTION INTRAMUSCULAR; INTRAVENOUS; SUBCUTANEOUS
Status: COMPLETED
Start: 2023-05-10 | End: 2023-05-10

## 2023-05-10 RX ORDER — FENTANYL CITRATE 50 UG/ML
100 INJECTION, SOLUTION INTRAMUSCULAR; INTRAVENOUS
Status: ACTIVE | OUTPATIENT
Start: 2023-05-10 | End: 2023-05-11

## 2023-05-10 RX ORDER — SODIUM CHLORIDE 0.9 % (FLUSH) 0.9 %
10 SYRINGE (ML) INJECTION
Status: CANCELLED | OUTPATIENT
Start: 2023-05-10

## 2023-05-10 RX ORDER — DIAZEPAM 5 MG/1
10 TABLET ORAL
Status: DISCONTINUED | OUTPATIENT
Start: 2023-05-10 | End: 2023-05-10 | Stop reason: HOSPADM

## 2023-05-10 RX ORDER — CLINDAMYCIN PHOSPHATE 900 MG/50ML
900 INJECTION, SOLUTION INTRAVENOUS
Status: DISCONTINUED | OUTPATIENT
Start: 2023-05-10 | End: 2023-05-13

## 2023-05-10 RX ADMIN — ROCURONIUM BROMIDE 45 MG: 10 INJECTION INTRAVENOUS at 03:05

## 2023-05-10 RX ADMIN — LEVALBUTEROL HYDROCHLORIDE 1.25 MG: 1.25 SOLUTION RESPIRATORY (INHALATION) at 04:05

## 2023-05-10 RX ADMIN — HEPARIN SODIUM 5000 UNITS: 5000 INJECTION, SOLUTION INTRAVENOUS; SUBCUTANEOUS at 03:05

## 2023-05-10 RX ADMIN — LIDOCAINE HYDROCHLORIDE 50 MG: 20 INJECTION INTRAVENOUS at 03:05

## 2023-05-10 RX ADMIN — FENTANYL CITRATE 50 MCG: 50 INJECTION INTRAMUSCULAR; INTRAVENOUS at 03:05

## 2023-05-10 RX ADMIN — ALBUTEROL SULFATE 4 PUFF: 90 AEROSOL, METERED RESPIRATORY (INHALATION) at 04:05

## 2023-05-10 RX ADMIN — MUPIROCIN: 20 OINTMENT TOPICAL at 09:05

## 2023-05-10 RX ADMIN — SODIUM CHLORIDE, POTASSIUM CHLORIDE, SODIUM LACTATE AND CALCIUM CHLORIDE: 600; 310; 30; 20 INJECTION, SOLUTION INTRAVENOUS at 03:05

## 2023-05-10 RX ADMIN — SUCCINYLCHOLINE CHLORIDE 160 MG: 20 INJECTION, SOLUTION INTRAMUSCULAR; INTRAVENOUS at 03:05

## 2023-05-10 RX ADMIN — HYDROMORPHONE HYDROCHLORIDE 0.2 MG: 1 INJECTION, SOLUTION INTRAMUSCULAR; INTRAVENOUS; SUBCUTANEOUS at 05:05

## 2023-05-10 RX ADMIN — SODIUM CHLORIDE, POTASSIUM CHLORIDE, SODIUM LACTATE AND CALCIUM CHLORIDE: 600; 310; 30; 20 INJECTION, SOLUTION INTRAVENOUS at 07:05

## 2023-05-10 RX ADMIN — ONDANSETRON 4 MG: 2 INJECTION INTRAMUSCULAR; INTRAVENOUS at 04:05

## 2023-05-10 RX ADMIN — LEVALBUTEROL HYDROCHLORIDE 1.25 MG: 1.25 SOLUTION RESPIRATORY (INHALATION) at 03:05

## 2023-05-10 RX ADMIN — CLINDAMYCIN IN 5 PERCENT DEXTROSE 900 MG: 18 INJECTION, SOLUTION INTRAVENOUS at 03:05

## 2023-05-10 RX ADMIN — SODIUM CITRATE AND CITRIC ACID MONOHYDRATE 30 ML: 500; 334 SOLUTION ORAL at 03:05

## 2023-05-10 RX ADMIN — INSULIN ASPART 8 UNITS: 100 INJECTION, SOLUTION INTRAVENOUS; SUBCUTANEOUS at 03:05

## 2023-05-10 RX ADMIN — SODIUM CHLORIDE, POTASSIUM CHLORIDE, SODIUM LACTATE AND CALCIUM CHLORIDE 3537 ML: 600; 310; 30; 20 INJECTION, SOLUTION INTRAVENOUS at 02:05

## 2023-05-10 RX ADMIN — INSULIN ASPART 6 UNITS: 100 INJECTION, SOLUTION INTRAVENOUS; SUBCUTANEOUS at 05:05

## 2023-05-10 RX ADMIN — FENTANYL CITRATE 50 MCG: 50 INJECTION INTRAMUSCULAR; INTRAVENOUS at 04:05

## 2023-05-10 RX ADMIN — ROCURONIUM BROMIDE 5 MG: 10 INJECTION INTRAVENOUS at 03:05

## 2023-05-10 RX ADMIN — CEFEPIME 2 G: 2 INJECTION, POWDER, FOR SOLUTION INTRAVENOUS at 03:05

## 2023-05-10 RX ADMIN — PROPOFOL 130 MG: 10 INJECTION, EMULSION INTRAVENOUS at 03:05

## 2023-05-10 RX ADMIN — SODIUM CITRATE AND CITRIC ACID MONOHYDRATE 30 ML: 334; 500 SOLUTION ORAL at 03:05

## 2023-05-10 RX ADMIN — PHENYLEPHRINE HYDROCHLORIDE 10 MCG/MIN: 10 INJECTION INTRAVENOUS at 04:05

## 2023-05-10 RX ADMIN — FAMOTIDINE 20 MG: 10 INJECTION, SOLUTION INTRAVENOUS at 03:05

## 2023-05-10 RX ADMIN — SUGAMMADEX 400 MG: 100 INJECTION, SOLUTION INTRAVENOUS at 04:05

## 2023-05-10 RX ADMIN — CLINDAMYCIN IN 5 PERCENT DEXTROSE 900 MG: 18 INJECTION, SOLUTION INTRAVENOUS at 10:05

## 2023-05-10 RX ADMIN — FAMOTIDINE 20 MG: 10 INJECTION INTRAVENOUS at 03:05

## 2023-05-10 RX ADMIN — HEPARIN SODIUM 5000 UNITS: 5000 INJECTION, SOLUTION INTRAVENOUS; SUBCUTANEOUS at 10:05

## 2023-05-10 RX ADMIN — VANCOMYCIN HYDROCHLORIDE 2000 MG: 1 INJECTION, POWDER, LYOPHILIZED, FOR SOLUTION INTRAVENOUS at 04:05

## 2023-05-10 NOTE — ED PROVIDER NOTES
"ED PROVIDER NOTE  5/10/2023    CHIEF COMPLAINT:   Chief Complaint   Patient presents with    Testicle Pain     Patient in via ems after home health notified them of "swollen and possible ruptured testicles". Home health notes open wound and "oozing". HR 130s. /62. Patient non compliant with medications.  per ems.       HISTORY OF PRESENT ILLNESS:   John Addison is a 68 y.o. male who presents with chief complaint Scrotal pain. Onset was about 2-3 days ago when he noticed pain and swelling to his scrotum that he states is just gotten rapidly worse, stating "I just came out of no where and did not give me any time." Pain is constant, severe, aggravated with sitting and touching the area.  Associated symptoms of lower abdominal pain.  States it has been having some foul-smelling drainage coming from his scrotum.  States he has had a little bit of diarrhea.  Denies fever or vomiting    The history is provided by the patient.       REVIEW OF SYSTEMS: as noted in the HPI.  NURSING NOTES REVIEWED      PAST MEDICAL/SURGICAL HISTORY:   Past Medical History:   Diagnosis Date    Coronary artery disease     Diabetes mellitus     Hypertension       Past Surgical History:   Procedure Laterality Date    CORONARY ANGIOPLASTY      TONSILLECTOMY  07/2018       FAMILY HISTORY:   Family History   Problem Relation Age of Onset    Hypertension Mother     Heart failure Mother     Heart attack Mother     Coronary artery disease Mother     Cancer Father     Cancer Sister        SOCIAL HISTORY:   Social History     Tobacco Use    Smoking status: Every Day     Packs/day: 0.50     Types: Cigarettes    Smokeless tobacco: Never   Substance Use Topics    Alcohol use: Yes     Comment: beer 2x/month    Drug use: Yes     Types: Marijuana       ALLERGIES: Review of patient's allergies indicates:  No Known Allergies    PHYSICAL EXAM:  Initial Vitals   BP Pulse Resp Temp SpO2   05/10/23 1337 05/10/23 1337 05/10/23 1337 05/10/23 1438 " 05/10/23 1337   106/62 (!) 130 (!) 21 98.4 °F (36.9 °C) 95 %      MAP       --                Physical Exam    Nursing note and vitals reviewed.  Constitutional: He appears well-developed and well-nourished. He appears ill. He appears distressed.   HENT:   Head: Normocephalic and atraumatic.   Nose: Nose normal.   Mouth/Throat: Oropharynx is clear and moist and mucous membranes are normal.   Eyes: Conjunctivae and EOM are normal. Pupils are equal, round, and reactive to light.   Neck: Neck supple. No tracheal deviation present.   Cardiovascular:  Regular rhythm, normal heart sounds, intact distal pulses and normal pulses.   Tachycardia present.         Pulmonary/Chest: Effort normal and breath sounds normal. No respiratory distress.   Abdominal: Abdomen is soft. There is abdominal tenderness in the suprapubic area. There is no rebound and no guarding.   Genitourinary: Right testis shows tenderness. Left testis shows tenderness. Uncircumcised.    Genitourinary Comments: Scrotum is erythematous and edematous with areas of necrosis, opening to the left side of the scrotum with a purulent drainage.     Musculoskeletal:         General: Normal range of motion.      Cervical back: Neck supple.     Neurological: He is alert and oriented to person, place, and time. GCS score is 15.   CN II-XII intact. Moves all extremities. No gross sensory or motor deficits.   Skin: Skin is warm, dry and intact.   Psychiatric: He has a normal mood and affect. His speech is normal and behavior is normal. Judgment and thought content normal. Cognition and memory are normal.       RESULTS:  Labs Reviewed   COMPREHENSIVE METABOLIC PANEL - Abnormal; Notable for the following components:       Result Value    Sodium Level 133 (*)     Carbon Dioxide 18 (*)     Glucose Level 295 (*)     Blood Urea Nitrogen 32.6 (*)     Creatinine 1.68 (*)     Calcium Level Total 10.3 (*)     Protein Total 8.4 (*)     Albumin Level 2.4 (*)     Globulin 6.0 (*)      Albumin/Globulin Ratio 0.4 (*)     All other components within normal limits   CBC WITH DIFFERENTIAL - Abnormal; Notable for the following components:    WBC 24.76 (*)     Hct 41.5 (*)     MCV 78.9 (*)     MCH 26.8 (*)     MPV 10.8 (*)     Neut # 20.75 (*)     Mono # 1.72 (*)     IG# 0.25 (*)     All other components within normal limits   HEMOGLOBIN A1C - Abnormal; Notable for the following components:    Hemoglobin A1c 9.7 (*)     All other components within normal limits   POCT GLUCOSE - Abnormal; Notable for the following components:    POCT Glucose 270 (*)     All other components within normal limits   POCT GLUCOSE - Abnormal; Notable for the following components:    POCT Glucose 324 (*)     All other components within normal limits   LACTIC ACID, PLASMA - Normal   BLOOD CULTURE [facility]   BLOOD CULTURE [facility]   CBC W/ AUTO DIFFERENTIAL    Narrative:     The following orders were created for panel order CBC auto differential.  Procedure                               Abnormality         Status                     ---------                               -----------         ------                     CBC with Differential[[id number]]        Abnormal            Final result                 Please view results for these tests on the individual orders.   URINALYSIS, REFLEX TO URINE CULTURE   EXTRA TUBES    Narrative:     The following orders were created for panel order EXTRA TUBES.  Procedure                               Abnormality         Status                     ---------                               -----------         ------                     Light Blue Top Hold[[id number]]                              In process                 Red Top Hold[[id number]]                                     In process                 Red Top Hold[[id number]]                                     In process                 Gold Top Hold[[id number]]                                    In process                 Pink Top Hold[[id number]]                                     In process                   Please view results for these tests on the individual orders.   LIGHT BLUE TOP HOLD   RED TOP HOLD   RED TOP HOLD   GOLD TOP HOLD   PINK TOP HOLD   POCT GLUCOSE MONITORING CONTINUOUS     Imaging Results    None         PROCEDURES:  Procedures    ECG:  EKG Readings: (Independently Interpreted)   Initial Reading: No STEMI. Rhythm: Sinus Tachycardia. Heart Rate: 118. Conduction: Normal. Axis: Normal.     ED COURSE AND MEDICAL DECISION MAKING:  Medications   fentaNYL injection 100 mcg (has no administration in time range)   LIDOcaine (PF) 10 mg/ml (1%) injection 10 mg (has no administration in time range)   sodium chloride 0.9% flush 10 mL (has no administration in time range)   ondansetron disintegrating tablet 8 mg (has no administration in time range)   melatonin tablet 6 mg (has no administration in time range)   sodium chloride 0.9% bolus 1,000 mL 1,000 mL (has no administration in time range)   heparin (porcine) injection 5,000 Units (5,000 Units Subcutaneous Given 5/10/23 2243)   clindamycin in D5W 900 mg/50 mL IVPB 900 mg (0 mg Intravenous Stopped 5/10/23 2344)   ceFEPIme (MAXIPIME) 2 g in dextrose 5 % in water (D5W) 5 % 50 mL IVPB (MB+) (2 g Intravenous New Bag 5/10/23 1554)   vancomycin - pharmacy to dose (has no administration in time range)   acetaminophen 160 mg/5 mL (5 mL) liquid (ADULTS) 649.6 mg (has no administration in time range)   oxyCODONE immediate release tablet 5 mg (has no administration in time range)   oxyCODONE immediate release tablet 5 mg (has no administration in time range)   0.9%  NaCl infusion (for blood administration) (has no administration in time range)   dextrose 10% bolus 125 mL 125 mL (has no administration in time range)   dextrose 10% bolus 250 mL 250 mL (has no administration in time range)   glucagon (human recombinant) injection 1 mg (has no administration in time range)   dextrose 10% bolus 125 mL 125 mL (has no  administration in time range)   dextrose 10% bolus 250 mL 250 mL (has no administration in time range)   insulin aspart U-100 injection 0-15 Units (8 Units Subcutaneous Given 5/10/23 1552)   lactated ringers infusion ( Intravenous New Bag 5/10/23 1914)   acetaminophen tablet 1,000 mg (has no administration in time range)   HYDROmorphone injection 1 mg (has no administration in time range)   mupirocin 2 % ointment ( Nasal Given 5/10/23 2111)   vancomycin (VANCOCIN) 1,500 mg in sodium chloride 0.9% 250 mL IVPB (has no administration in time range)   insulin aspart U-100 (NovoLOG) 100 unit/mL injection (  Canceled Entry 5/10/23 1945)   lactated ringers bolus 3,537 mL (0 mLs Intravenous Stopped 5/10/23 1511)   vancomycin (VANCOCIN) 2,000 mg in sodium chloride 0.9% 500 mL IVPB (2,000 mg Intravenous New Bag 5/10/23 1603)   sodium citrate-citric acid 500-334 mg/5 ml solution 30 mL (30 mLs Oral Given 5/10/23 1530)   famotidine (PF) injection 20 mg ( Intravenous Canceled Entry 5/10/23 1600)   levalbuterol (XOPENEX) 1.25 mg/3 mL nebulizer solution (1.25 mg continuous inhalation Given 5/10/23 1559)   insulin aspart U-100 (NovoLOG) 100 unit/mL injection (  Override pull for Anesthesia 5/10/23 1627)   levalbuterol (XOPENEX) 1.25 mg/3 mL nebulizer solution (1.25 mg continuous inhalation Given 5/10/23 1658)           Medical Decision Making  68-year-old male who presents with scrotal pain that he states is rapidly progressed over the past couple of days.  He has diffuse edema and erythema to the scrotum and peritoneum with some areas of necrosis to the scrotum, there is open wound to the left side of the scrotum with purulent drainage.  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  Patient is hypotensive and tachycardic upon rooming in the emergency department so we will give 30 milliliter/kg fluid bolus as well.    Surgery has seen and evaluated the patient and will  take him to the OR for surgical debridement.  I have spoken with the patient and/or caregivers. I have explained the patient's condition, diagnoses and treatment plan based on the information available to me at this time. I have answered the patient's and/or caregiver's questions and addressed any concerns. The patient and/or caregivers have as good an understanding of the patient's diagnosis, condition and treatment plan as can be expected at this point. The patient has been stabilized within the capability of the emergency department. The patient will be transported for further care and management or will be moved to an observation or inpatient service. I have communicated with the staff or medical practitioner taking over this patient's care.    I have personally provided 31 minutes of critical care time exclusive of time spent on separately billable procedures. Time includes review of laboratory data, radiology results, discussion with consultants, and monitoring for potential decompensation. Interventions were performed as documented above.     Amount and/or Complexity of Data Reviewed  Labs: ordered. Decision-making details documented in ED Course.     Details: CBC shows a leukocytosis of 24.7, lactic acid is normal.  CMP shows creatinine 1.68 which was 1.06 back in August, bicarb 18, normal LFTs, glucose 295.  ECG/medicine tests: ordered and independent interpretation performed. Decision-making details documented in ED Course.    Risk  Decision regarding hospitalization.  Emergency major surgery.    Critical Care  Total time providing critical care: 31 minutes      CLINICAL IMPRESSION:  1. Yesi's gangrene    2. Sepsis        DISPOSITION:   ED Disposition Condition    Admit Stable                    Manoj Jean DO  05/10/23 1050

## 2023-05-10 NOTE — ANESTHESIA PREPROCEDURE EVALUATION
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      NPO solids x 3 hours     History of CAD s/p PCI x 2 (LAD, RCA 2016, 2017), HLD, HTN, DM2, ETOH abuse, Smoker , COPD    Noted Neg Nuclear Stress in Dec 2016 EF 51%   --------------------------------------------------------------------------------  FINDINGS:  No alveolar consolidation, effusion, or pneumothorax is seen.   The thoracic aorta is normal  cardiac silhouette, central pulmonary vessels and mediastinum are normal in size and are grossly unremarkable. There is some pleural thickening along side the lateral chest wall on the right with some blunting of the right costophrenic angle similar to what was seen on the examination of Tess 15, 2021.     No focal consolidative changes     Impression:     Minimal blunting of the right costophrenic angle with associated pleural thickening along side the lateral chest wall similar to previous exam.     Otherwise no active pulmonary disease        Electronically signed by: Alex Schmidt  Date:                                            08/15/2022  Time:                                           16:08    -------------------------------------------------------------------------------------------  Vitals:    05/10/23 1337 05/10/23 1348 05/10/23 1438   BP: 106/62 (!) 87/64    BP Location: Right arm     Patient Position: Lying     Pulse: (!) 130 (!) 124    Resp: (!) 21 (!) 22    Temp:   36.9 °C (98.4 °F)   SpO2: 95% 98%    Weight: 117.9 kg (260 lb)     Height: 5' 10" (1.778 m)         Active Ambulatory Problems     Diagnosis Date Noted    Type 2 diabetes mellitus with diabetic neuropathy, without long-term current use of insulin 06/13/2022    Coronary artery disease 06/13/2022    Tobacco abuse 06/13/2022    Hypertension 06/13/2022    Hyperlipidemia " LDL goal <70 06/13/2022    Pain in both lower extremities 06/13/2022    Uncontrolled type 2 diabetes mellitus with nephropathy 06/24/2022    Abdominal swelling 08/22/2022    Abnormal findings on diagnostic imaging of lung 08/22/2022    Abnormal liver function tests 08/22/2022    Anemia 08/22/2022    Chronic allergic conjunctivitis 08/22/2022    Chronic obstructive pulmonary disease 08/22/2022    Current drinker of alcohol 08/22/2022    Intra-abdominal lymphadenopathy 08/22/2022    Tegmen defect of base of skull 08/22/2022    Cigarette nicotine dependence with nicotine-induced disorder 08/22/2022    Atypical chest pain 08/22/2022    Xerosis of skin 10/10/2022    Dystrophic nail 10/10/2022    Onychomycosis of multiple toenails with type 2 diabetes mellitus 10/10/2022    Avulsion of toenail of left foot 10/10/2022     Resolved Ambulatory Problems     Diagnosis Date Noted    No Resolved Ambulatory Problems     Past Medical History:   Diagnosis Date    Diabetes mellitus      Vent. Rate : 118 BPM     Atrial Rate : 118 BPM      P-R Int : 134 ms          QRS Dur : 076 ms       QT Int : 338 ms       P-R-T Axes : 062 080 024 degrees      QTc Int : 473 ms     Sinus tachycardia   Otherwise normal ECG   When compared with ECG of 15-AUG-2022 13:39,   Nonspecific T wave abnormality no longer evident in Lateral leads     Referred By: [email or web address]    SELF           Confirmed By:       Specimen Collected: 05/10/23 14:01 Last Resulted: 05/10/23 14:06          Lab Results   Component Value Date    WBC 10.0 08/15/2022    HGB 15.9 08/15/2022    HCT 45.2 08/15/2022     08/15/2022    ALT 32 08/15/2022    AST 23 08/15/2022     08/15/2022    K 4.7 08/15/2022    CREATININE 1.06 08/15/2022    BUN 23.2 08/15/2022    CO2 24 08/15/2022    INR 1.07 06/25/2018    HGBA1C 12.1 (H) 06/26/2018     Lab Results   Component Value Date    WBC 24.76 (H) 05/10/2023    HGB 14.1 05/10/2023    HCT 41.5 (L) 05/10/2023      05/10/2023    ALT 25 05/10/2023    AST 26 05/10/2023     (L) 05/10/2023    K 4.1 05/10/2023    CREATININE 1.68 (H) 05/10/2023    BUN 32.6 (H) 05/10/2023    CO2 18 (L) 05/10/2023    INR 1.07 06/25/2018    HGBA1C 12.1 (H) 06/26/2018     Pre-op Assessment    I have reviewed the Patient Summary Reports.     I have reviewed the Nursing Notes. I have reviewed the NPO Status.   I have reviewed the Medications.     Review of Systems  Anesthesia Hx:  No problems with previous Anesthesia  History of prior surgery of interest to airway management or planning: Denies Family Hx of Anesthesia complications.   Denies Personal Hx of Anesthesia complications.   Hematology/Oncology:  Hematology Normal   Oncology Normal     EENT/Dental:EENT/Dental Normal   Cardiovascular:  Cardiovascular Normal     Pulmonary:  Pulmonary Normal    Renal/:  Renal/ Normal     Hepatic/GI:  Hepatic/GI Normal    Musculoskeletal:  Musculoskeletal Normal    Neurological:  Neurology Normal    Endocrine:  Endocrine Normal    Dermatological:  Skin Normal    Psych:  Psychiatric Normal              Anesthesia Plan  Type of Anesthesia, risks & benefits discussed:    Anesthesia Type: Gen ETT  Intra-op Monitoring Plan: Standard ASA Monitors  Post Op Pain Control Plan: multimodal analgesia and IV/PO Opioids PRN  Induction:  IV  Airway Plan: Direct  Informed Consent: Informed consent signed with the Patient and all parties understand the risks and agree with anesthesia plan.  All questions answered. Patient consented to blood products? No  ASA Score: 4 Emergent  Day of Surgery Review of History & Physical: H&P Update referred to the surgeon/provider.    Ready For Surgery From Anesthesia Perspective.     .

## 2023-05-10 NOTE — H&P
[facility]   H&P  General Surgery    Patient Name: John Addison  YOB: 1955  Date of Admission: 5/10/2023  Date: 05/10/2023 2:38 PM  Reason for Consult: Groin cellulitis with concern for yasmin's   HD#0  POD#Day of Surgery    PRESENTING HISTORY     Chief Complaint/Reason for Admission: Groin pain with drainage from scrotum    History of Present Illness:  68 year old male with past medical history of HTN, HLD, uncontrolled diabetes mellitus type 2, multiple previous MI s/p PCI w/ stenting, tobacco abuse, and CAD on plavix presents to the ED with diffuse groin pain and drainage from his penis and scrotum for the past 2 days. The patient reports intense pain that begins at the superior aspect of his groin and extends to the inferior pole of his scrotum. He reports that this began spontaneously and that he has had no known trauma or known laceration. He reports no pain in his abdomen, and that he has had no nausea, vomiting, or fever. The patient reports one other occurrence of this type of problem 2 years ago, and that he did not seek medical intervention. The problem self healed the previous occasion. 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    Review of Systems:  12 point ROS negative except as stated in HPI    PAST HISTORY:   Past medical history:  Past Medical History:   Diagnosis Date    Coronary artery disease     Diabetes mellitus     Hypertension        Past surgical history:  Past Surgical History:   Procedure Laterality Date    CORONARY ANGIOPLASTY      TONSILLECTOMY  07/2018       Family history:  Family History   Problem Relation Age of Onset    Hypertension Mother     Heart failure Mother     Heart attack Mother     Coronary artery disease Mother     Cancer Father     Cancer Sister        Social history:  Social History     Socioeconomic History    Marital status: Single   Tobacco Use    Smoking status: Every Day     Packs/day: 0.50     Types: Cigarettes     Smokeless tobacco: Never   Substance and Sexual Activity    Alcohol use: Yes     Comment: beer 2x/month    Drug use: Yes     Types: Marijuana    Sexual activity: Not Currently     Social Determinants of Health     Physical Activity: Inactive    Days of Exercise per Week: 0 days    Minutes of Exercise per Session: 0 min     Social History     Tobacco Use   Smoking Status Every Day    Packs/day: 0.50    Types: Cigarettes   Smokeless Tobacco Never         MEDICATIONS & ALLERGIES:   Allergies: Review of patient's allergies indicates:  No Known Allergies    No current facility-administered medications on file prior to encounter.     Current Outpatient Medications on File Prior to Encounter   Medication Sig    albuterol (PROVENTIL/VENTOLIN HFA) 90 mcg/actuation inhaler Inhale 2 puffs into the lungs as needed for Wheezing or Shortness of Breath.    ammonium lactate (LAC-HYDRIN) 12 % lotion Apply topically as needed for Dry Skin. Apply to feet exception between toes, thin then of Vaseline.    aspirin 81 MG Chew Take 81 mg by mouth once daily.    atorvastatin (LIPITOR) 80 MG tablet Take 1 tablet (80 mg total) by mouth once daily.    clopidogreL (PLAVIX) 75 mg tablet Take 1 tablet (75 mg total) by mouth once daily.    cromolyn (OPTICROM) 4 % ophthalmic solution 1 drop.    EASY TOUCH TWIST LANCETS 33 gauge Misc USE ONE TWICE DAILY    famotidine (PEPCID) 40 MG tablet Take 1 tablet (40 mg total) by mouth 2 (two) times daily.    fluticasone propionate (FLONASE) 50 mcg/actuation nasal spray 1 spray by Each Nostril route as needed.    fluticasone-salmeterol 230-21 mcg/dose (ADVAIR HFA) 230-21 mcg/actuation HFAA inhaler Inhale 1 puff into the lungs 2 (two) times a day. Controller    glipiZIDE (GLUCOTROL) 10 MG tablet Take 10 mg by mouth 2 (two) times daily.    lisinopriL 10 MG tablet Take 1 tablet (10 mg total) by mouth once daily.    metFORMIN (GLUCOPHAGE) 1000 MG tablet Take 1 tablet (1,000 mg total) by mouth 2 (two) times  daily with meals.    metoprolol tartrate (LOPRESSOR) 50 MG tablet Take 1 tablet (50 mg total) by mouth 2 (two) times daily.    omeprazole (PRILOSEC) 40 MG capsule Take 1 capsule (40 mg total) by mouth once daily.    TRUE METRIX GLUCOSE METER Misc USE ONE TWICE DAILY       Scheduled Meds:   heparin (porcine)        ceFEPime (MAXIPIME) IVPB  2 g Intravenous Q12H    clindamycin in D5W  900 mg Intravenous Q8H    famotidine (PF)        fentaNYL  100 mcg Intravenous ED 1 Time    heparin (porcine)  5,000 Units Subcutaneous Q8H    lactated ringers  30 mL/kg Intravenous ED 1 Time    levalbuterol        meperidine  12.5 mg Intravenous Once    ondansetron  4 mg Intravenous Once    oxyCODONE-acetaminophen  2 tablet Oral Once    sodium chloride 0.9%  1,000 mL Intravenous Once    sodium citrate-citric acid 500-334 mg/5 ml        vancomycin (VANCOCIN) IVPB  2,000 mg Intravenous Once       Continuous Infusions:   sodium chloride 0.9%         PRN Meds:    OBJECTIVE:     Vital Signs:  Temp:  [98.2 °F (36.8 °C)-98.4 °F (36.9 °C)] 98.2 °F (36.8 °C)  Pulse:  [112-130] 112  Resp:  [19-22] 19  SpO2:  [95 %-98 %] 97 %  BP: ()/(62-85) 117/85  Body mass index is 37.31 kg/m².     No intake/output data recorded.  No intake/output data recorded.    Physical Exam:  General:  Morbidly obese, appears uncomfortable  HEENT:  Normocephalic, atraumatic, PERRL, EOMI, clear sclera, ears normal, neck supple, throat clear without erythema or exudates  CVS:  RRR  Resp:  Normal work of breathing on RA, equal rise and fall of the chest  GI: Abdomen soft, non-tender, non-distended, no masses, no guarding, no rebound  :  skin excoriation in left inguinal crease; diffuse erythema and edema of scrotum, significant TTP; areas of skin breakdown throughout scrotum; left aspect of posterior scrotum with 5x5 cm area of necrotic tissue with dish-water appearing drainage  MSK:  No muscle atrophy, cyanosis, peripheral edema, moving all extremities  spontaneously  Vascular: *2+ radial pulses bilaterally*. All extremities WWP  Skin:  No rashes, ulcers, erythema  Neuro:  CNII-XII grossly intact, alert and oriented to person, place, and time    Laboratory:  No results for input(s): WBC, HGB, HCT, PLT, PTT, INR in the last 72 hours.  No results for input(s): NA, K, CL, CO2, BUN, CREATININE, GLU, CALCIUM, MG, PHOS, PROT, ALBUMIN, BILITOT, AST, ALKPHOS, ALT in the last 72 hours.  Troponin:  No results for input(s): TROPONINI in the last 72 hours.  CBC:  No results for input(s): WBC, RBC, HGB, HCT, PLT, MCV, MCH, MCHC in the last 72 hours.  CMP:  No results for input(s): GLU, CALCIUM, ALBUMIN, PROT, NA, K, CO2, CL, BUN, CREATININE, ALKPHOS, ALT, AST, BILITOT in the last 72 hours.  Lactic Acid:  No results for input(s): LACTATE in the last 72 hours.  Etoh:  No results for input(s): ALCOHOLMEDIC in the last 72 hours.  Drug Screen:  No results for input(s): PCDSCOMETHA, COCAINEMETAB, OPIATESCREEN, BARBITURATES, AMPHETAMINES, MARIJUANATHC, PCDSOPHENCYN, CREATRANDUR, TOXINFO in the last 72 hours.    ABG:  No results for input(s): PH, PCO2, PO2, HCO3, BE, POCSATURATED in the last 72 hours.    Diagnostic Results:  No results found in the last 24 hours.  X-Ray Chest AP Portable    (Results Pending)       Microbiology:  Microbiology Results (last 7 days)       Procedure Component Value Units Date/Time    Blood culture #1 **CANNOT BE ORDERED STAT** [[id number]] Collected: 05/10/23 1448    Order Status: Sent Specimen: Blood from Arm, Right Updated: 05/10/23 1450    Blood culture #2 **CANNOT BE ORDERED STAT** [[id number]] Collected: 05/10/23 1448    Order Status: Sent Specimen: Blood from Hand, Right Updated: 05/10/23 1450    Blood culture x two cultures. Draw prior to antibiotics. [[id number]] Collected: 05/10/23 1440    Order Status: Canceled Specimen: Blood from Arm, Right     Blood culture x two cultures. Draw prior to antibiotics. 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    Plan:  -Admit to general surgery service  -To OR emergently for debridement of scrotum and perineum  -Labs pending  -Broad spectrum antibiotics  -Fluid boluses  -Lovenox for DVT ppx  -Hold plavix  -Dispo pending OR findings  -Patient requests to be full code, reports he would not want prolonged intubation if it comes to that    Lloyd Everett, MS3  5/10/2023  2:38 PM     PGY1 Attestation   I have seen and examined the patient with the medical student above. I agree with the above documentation and the note was edited as necessary.    César Al MD   [employer] General Surgery

## 2023-05-10 NOTE — ANESTHESIA PROCEDURE NOTES
Intubation    Date/Time: 5/10/2023 3:43 PM  Performed by: Anamika Bliss CRNA  Authorized by: Anamika Bliss CRNA     Intubation:     Induction:  Rapid sequence induction    Intubated:  Postinduction    Mask Ventilation:  Not attempted    Attempts:  1    Attempted By:  CRNA    Method of Intubation:  Direct    Blade:  Glidescope 4    Laryngeal View Grade: Grade I - full view of cords      Difficult Airway Encountered?: No      Complications:  None    Airway Device:  Oral endotracheal tube    Airway Device Size:  7.5    Style/Cuff Inflation:  Cuffed (inflated to minimal occlusive pressure)    Inflation Amount (mL):  8    Tube secured:  23    Secured at:  The lips    Placement Verified By:  Capnometry    Complicating Factors:  None    Findings Post-Intubation:  BS equal bilateral and atraumatic/condition of teeth unchanged

## 2023-05-10 NOTE — OP NOTE
[facility] - Periop Services  General Surgery  Operative Note    SUMMARY     Date of Procedure: 5/10/2023     Procedure: Procedure(s) (LRB):  DEBRIDEMENT, WOUND (N/A)       Surgeon(s) and Role:  Smith Lay MD - Primary  Imtiaz Nicole MD - Resident - Assisting  Sarah Rosas MD - Resident - Assisting   César Al MD - Resident - Assisting    Pre-Operative Diagnosis: Necrotizing soft tissue infection of perineum     Post-Operative Diagnosis: Necrotizing soft tissue infection of perineum     Anesthesia: General    Operative Findings (including complications, if any): Extensive necrotizing soft tissue infection involving entire scrotum and into the perineum. Both testicles noted to be viable at time of operation. Debridement carried back to healthy bleeding tissue. No evidence of tracking into bilateral inguinal canals or towards anus/rectum. Left medial thigh subcutaneous pocket created to house testicle for viability during wound healing.     Description of Technical Procedures:   The patient was brought to the operating room, placed supine on the operating table and general anesthesia was induced. Once adequately anesthetized, attempts at stubbs catheter placement were made. There as significant smegma encountered within the foreskin of the uncircumcised penis and contracture of the foreskin that made retraction very difficult. Blunt dissection using a rom clamp was performed in order to break adhesions within the foreskin and the debris was cleaned using sterile betadine 4x4s until the meatus was identified. A coudé stubbs catheter was placed once the meatus was identified with successful return of yellow urine and the stubbs was placed to gravity.     The perineum including the penis, scrotum, and bilateral groins was then prepped and draped in the usual sterile fashion. A timeout was performed confirming the correct patient, procedure, and cely-operative care and all team members were in  agreement.     Incision was made on the left lateral scrotum in the area of eschar. Incision was carried down into the scrotum with return of murky purulent fluid. Cultures x2 were taken of the fluid and passed off. Aggressive debridement of the scrotal tissue was then performed back to healthy, bleeding tissue. Both testicles were noted to be viable at the time of debridement. The right inguinal area was noted to have skin excoriation and small areas of induration and a counter incision was made in the right groin without evidence of tracking anywhere into the perineum or inguinal canal. At this time, there was a large defect of the inferior scrotum after debridement with no adequate covering of the left testicle. Decision was made to create a small pocket in the left medial thigh using cautery and use this to house the testicle to maintain viability. The testicle was placed within the pocket without evidence of torsion or significant compression.     Once all necrotic / infected tissue was debrided, the wound was dressed with betadine soaked kerlix and sterile gauze and dressings. The patient tolerated the procedure well, was awoken from anesthesia and extubated and taken to PACU in stable condition. Dr. Lay was present for critical portions of the procedure.     Estimated Blood Loss (EBL): 10cc           Implants: * No implants in log *    Specimens:   Specimen (24h ago, onward)       Start     Ordered    05/10/23 162  Specimen to Pathology  RELEASE UPON ORDERING        References:    Click here for ordering Quick Tip   Question:  Release to patient  Answer:  Immediate    05/10/23 9326                            Condition: Good    Disposition: PACU - hemodynamically stable.    Sarah Rosas MD   [employer] General Surgery PGY 3

## 2023-05-10 NOTE — TRANSFER OF CARE
Anesthesia Transfer of Care Note    Patient: John Addison    Procedure(s) Performed: Procedure(s) (LRB):  DEBRIDEMENT, WOUND (N/A)    Patient location: PACU    Anesthesia Type: general    Transport from OR: Transported from OR on room air with adequate spontaneous ventilation    Post pain: adequate analgesia    Post assessment: no apparent anesthetic complications and tolerated procedure well    Post vital signs: stable    Level of consciousness: awake    Nausea/Vomiting: no nausea/vomiting    Complications: none    Transfer of care protocol was followed

## 2023-05-10 NOTE — ANESTHESIA PROCEDURE NOTES
Arterial    Diagnosis: Yesi's Gangrene    Patient location during procedure: done in OR  Procedure start time: 5/10/2023 3:55 PM  Procedure end time: 5/10/2023 4:00 PM    Staffing  Authorizing Provider: Nazia Rosario MD  Performing Provider: Nazia Rosario MD    Anesthesiologist was present at the time of the procedure.  Arterial  Skin Prep: alcohol swabs  Local Infiltration: none  Orientation: left  Location: radial    Catheter Size: 20 G  Catheter placement by Anatomical landmarks. Heme positive aspiration all ports. Insertion Attempts: 2  Assessment  Dressing: secured with tape and tegaderm

## 2023-05-10 NOTE — MEDICAL/APP STUDENT
[facility]   H&P  General Surgery    Patient Name: John Addison  YOB: 1955  Date of Admission: 5/10/2023  Date: 05/10/2023 2:38 PM  Reason for Consult: Groin cellulitis with concern for yasmin's   HD#0  POD#Day of Surgery    PRESENTING HISTORY     Chief Complaint/Reason for Admission: Groin pain with bleeding and pus from penis    History of Present Illness:  68 year old male with past medical history of diabetes mellitus type 2 and CAD presents to the ED with diffuse groin pain and bleeding and pus from the urethra and scrotum for the past 2 days. The patient reports intense pain that begins at the superior aspect of his groin and extends to the inferior pole of his scrotum. He reports that this began spontaneously and that he has had no known trauma or known laceration. He reports no pain in his abdomen, and that he has had no nausea, vomiting, or fever. The patient reports one other occurrence of this type of problem 2 years ago, and that he did not seek medical intervention. The problem self healed the previous occasion. 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    Review of Systems:  12 point ROS negative except as stated in HPI    PAST HISTORY:   Past medical history:  Past Medical History:   Diagnosis Date    Coronary artery disease     Diabetes mellitus     Hypertension        Past surgical history:  Past Surgical History:   Procedure Laterality Date    CORONARY ANGIOPLASTY      TONSILLECTOMY  07/2018       Family history:  Family History   Problem Relation Age of Onset    Hypertension Mother     Heart failure Mother     Heart attack Mother     Coronary artery disease Mother     Cancer Father     Cancer Sister        Social history:  Social History     Socioeconomic History    Marital status: Single   Tobacco Use    Smoking status: Every Day     Packs/day: 0.50     Types: Cigarettes    Smokeless tobacco: Never   Substance and Sexual Activity    Alcohol use:  Yes     Comment: beer 2x/month    Drug use: Yes     Types: Marijuana    Sexual activity: Not Currently     Social Determinants of Health     Physical Activity: Inactive    Days of Exercise per Week: 0 days    Minutes of Exercise per Session: 0 min     Social History     Tobacco Use   Smoking Status Every Day    Packs/day: 0.50    Types: Cigarettes   Smokeless Tobacco Never         MEDICATIONS & ALLERGIES:   Allergies: Review of patient's allergies indicates:  No Known Allergies    No current facility-administered medications on file prior to encounter.     Current Outpatient Medications on File Prior to Encounter   Medication Sig    albuterol (PROVENTIL/VENTOLIN HFA) 90 mcg/actuation inhaler Inhale 2 puffs into the lungs as needed for Wheezing or Shortness of Breath.    ammonium lactate (LAC-HYDRIN) 12 % lotion Apply topically as needed for Dry Skin. Apply to feet exception between toes, thin then of Vaseline.    aspirin 81 MG Chew Take 81 mg by mouth once daily.    atorvastatin (LIPITOR) 80 MG tablet Take 1 tablet (80 mg total) by mouth once daily.    clopidogreL (PLAVIX) 75 mg tablet Take 1 tablet (75 mg total) by mouth once daily.    cromolyn (OPTICROM) 4 % ophthalmic solution 1 drop.    EASY TOUCH TWIST LANCETS 33 gauge Misc USE ONE TWICE DAILY    famotidine (PEPCID) 40 MG tablet Take 1 tablet (40 mg total) by mouth 2 (two) times daily.    fluticasone propionate (FLONASE) 50 mcg/actuation nasal spray 1 spray by Each Nostril route as needed.    fluticasone-salmeterol 230-21 mcg/dose (ADVAIR HFA) 230-21 mcg/actuation HFAA inhaler Inhale 1 puff into the lungs 2 (two) times a day. Controller    glipiZIDE (GLUCOTROL) 10 MG tablet Take 10 mg by mouth 2 (two) times daily.    lisinopriL 10 MG tablet Take 1 tablet (10 mg total) by mouth once daily.    metFORMIN (GLUCOPHAGE) 1000 MG tablet Take 1 tablet (1,000 mg total) by mouth 2 (two) times daily with meals.    metoprolol tartrate (LOPRESSOR) 50 MG tablet Take 1 tablet  (50 mg total) by mouth 2 (two) times daily.    omeprazole (PRILOSEC) 40 MG capsule Take 1 capsule (40 mg total) by mouth once daily.    TRUE METRIX GLUCOSE METER Misc USE ONE TWICE DAILY       Scheduled Meds:   fentaNYL  100 mcg Intravenous ED 1 Time    lactated ringers  30 mL/kg Intravenous ED 1 Time    piperacillin-tazobactam (ZOSYN) IVPB  4.5 g Intravenous Q8H    vancomycin (VANCOCIN) IVPB  2,000 mg Intravenous Once       Continuous Infusions:    PRN Meds:    OBJECTIVE:     Vital Signs:  Pulse:  [124-130] 124  Resp:  [21-22] 22  SpO2:  [95 %-98 %] 98 %  BP: ()/(62-64) 87/64  Body mass index is 37.31 kg/m².     No intake/output data recorded.  No intake/output data recorded.    Physical Exam:  General:  Well developed, well nourished, patient appear in intense pain  HEENT:  Normocephalic, atraumatic, PERRL, EOMI, clear sclera, ears normal, neck supple, throat clear without erythema or exudates  CVS:  RRR  Resp:  Normal work of breathing on RA, equal rise and fall of the chest  GI: Abdomen soft, non-tender, non-distended, no masses, no guarding, no rebound  :  Diffuse erythema and gangrenous tissue on the inferior aspect of the scrotum. Discharge from the inferior aspect of the scrotum as well as the urethral opening can be seen. Black colored tissue at the bottom of the scrotum that extends anteriorly. Scrotum appears enlarged and is diffusely tender from the inferior pole, to the top of the groin.  MSK:  No muscle atrophy, cyanosis, peripheral edema, moving all extremities spontaneously  Vascular: *2+ radial pulses bilaterally*. All extremities WWP  Skin:  No rashes, ulcers, erythema  Neuro:  CNII-XII grossly intact, alert and oriented to person, place, and time    Laboratory:  No results for input(s): WBC, HGB, HCT, PLT, PTT, INR in the last 72 hours.  No results for input(s): NA, K, CL, CO2, BUN, CREATININE, GLU, CALCIUM, MG, PHOS, PROT, ALBUMIN, BILITOT, AST, ALKPHOS, ALT in the last 72  hours.  Troponin:  No results for input(s): TROPONINI in the last 72 hours.  CBC:  No results for input(s): WBC, RBC, HGB, HCT, PLT, MCV, MCH, MCHC in the last 72 hours.  CMP:  No results for input(s): GLU, CALCIUM, ALBUMIN, PROT, NA, K, CO2, CL, BUN, CREATININE, ALKPHOS, ALT, AST, BILITOT in the last 72 hours.  Lactic Acid:  No results for input(s): LACTATE in the last 72 hours.  Etoh:  No results for input(s): ALCOHOLMEDIC in the last 72 hours.  Drug Screen:  No results for input(s): PCDSCOMETHA, COCAINEMETAB, OPIATESCREEN, BARBITURATES, AMPHETAMINES, MARIJUANATHC, PCDSOPHENCYN, CREATRANDUR, TOXINFO in the last 72 hours.    ABG:  No results for input(s): PH, PCO2, PO2, HCO3, BE, POCSATURATED in the last 72 hours.    Diagnostic Results:  No results found in the last 24 hours.  X-Ray Chest AP Portable    (Results Pending)       Microbiology:  Microbiology Results (last 7 days)       Procedure Component Value Units Date/Time    Blood culture x two cultures. Draw prior to antibiotics. [[id number]]     Order Status: Sent Specimen: Blood     Blood culture x two cultures. Draw prior to antibiotics. 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    Lloyd Everett, MS3  5/10/2023  2:38 PM

## 2023-05-11 LAB
ALBUMIN SERPL-MCNC: 1.9 G/DL (ref 3.4–4.8)
ALBUMIN/GLOB SERPL: 0.4 RATIO (ref 1.1–2)
ALP SERPL-CCNC: 89 UNIT/L (ref 40–150)
ALT SERPL-CCNC: 21 UNIT/L (ref 0–55)
APPEARANCE UR: CLEAR
AST SERPL-CCNC: 24 UNIT/L (ref 5–34)
BACTERIA #/AREA URNS AUTO: ABNORMAL /HPF
BILIRUB UR QL STRIP.AUTO: NEGATIVE MG/DL
BILIRUBIN DIRECT+TOT PNL SERPL-MCNC: 1.1 MG/DL
BUN SERPL-MCNC: 29.8 MG/DL (ref 8.4–25.7)
CALCIUM SERPL-MCNC: 8.9 MG/DL (ref 8.8–10)
CASTS URNS MICRO: ABNORMAL /LPF
CHLORIDE SERPL-SCNC: 107 MMOL/L (ref 98–107)
CO2 SERPL-SCNC: 19 MMOL/L (ref 23–31)
COLOR UR: YELLOW
CREAT SERPL-MCNC: 1.09 MG/DL (ref 0.73–1.18)
ERYTHROCYTE [DISTWIDTH] IN BLOOD BY AUTOMATED COUNT: 16 % (ref 11.5–17)
GFR SERPLBLD CREATININE-BSD FMLA CKD-EPI: >60 MLS/MIN/1.73/M2
GLOBULIN SER-MCNC: 4.9 GM/DL (ref 2.4–3.5)
GLUCOSE SERPL-MCNC: 164 MG/DL (ref 82–115)
GLUCOSE UR QL STRIP.AUTO: ABNORMAL MG/DL
GRAM STN SPEC: NORMAL
HCT VFR BLD AUTO: 33.5 % (ref 42–52)
HGB BLD-MCNC: 11.9 G/DL (ref 14–18)
HYALINE CASTS #/AREA URNS LPF: ABNORMAL /LPF
KETONES UR QL STRIP.AUTO: NEGATIVE MG/DL
LEUKOCYTE ESTERASE UR QL STRIP.AUTO: 75 UNIT/L
MAGNESIUM SERPL-MCNC: 2 MG/DL (ref 1.6–2.6)
MCH RBC QN AUTO: 27.6 PG (ref 27–31)
MCHC RBC AUTO-ENTMCNC: 35.5 G/DL (ref 33–36)
MCV RBC AUTO: 77.7 FL (ref 80–94)
MUCOUS THREADS URNS QL MICRO: ABNORMAL /LPF
NITRITE UR QL STRIP.AUTO: NEGATIVE
NRBC BLD AUTO-RTO: 0 %
PH UR STRIP.AUTO: 5.5 [PH]
PHOSPHATE SERPL-MCNC: 3.5 MG/DL (ref 2.3–4.7)
PLATELET # BLD AUTO: 250 X10(3)/MCL (ref 130–400)
PMV BLD AUTO: 10.7 FL (ref 7.4–10.4)
POCT GLUCOSE: 147 MG/DL (ref 70–110)
POCT GLUCOSE: 222 MG/DL (ref 70–110)
POCT GLUCOSE: 286 MG/DL (ref 70–110)
POTASSIUM SERPL-SCNC: 4 MMOL/L (ref 3.5–5.1)
PROT SERPL-MCNC: 6.8 GM/DL (ref 5.8–7.6)
PROT UR QL STRIP.AUTO: ABNORMAL MG/DL
RBC # BLD AUTO: 4.31 X10(6)/MCL (ref 4.7–6.1)
RBC #/AREA URNS AUTO: ABNORMAL /HPF
RBC UR QL AUTO: ABNORMAL UNIT/L
SODIUM SERPL-SCNC: 138 MMOL/L (ref 136–145)
SP GR UR STRIP.AUTO: 1.03
SQUAMOUS #/AREA URNS LPF: ABNORMAL /HPF
UROBILINOGEN UR STRIP-ACNC: NORMAL MG/DL
WBC # SPEC AUTO: 24.45 X10(3)/MCL (ref 4.5–11.5)
WBC #/AREA URNS AUTO: ABNORMAL /HPF

## 2023-05-11 PROCEDURE — 84100 ASSAY OF PHOSPHORUS: CPT | Performed by: STUDENT IN AN ORGANIZED HEALTH CARE EDUCATION/TRAINING PROGRAM

## 2023-05-11 PROCEDURE — 63600175 PHARM REV CODE 636 W HCPCS

## 2023-05-11 PROCEDURE — 94761 N-INVAS EAR/PLS OXIMETRY MLT: CPT

## 2023-05-11 PROCEDURE — 85027 COMPLETE CBC AUTOMATED: CPT | Performed by: STUDENT IN AN ORGANIZED HEALTH CARE EDUCATION/TRAINING PROGRAM

## 2023-05-11 PROCEDURE — 80053 COMPREHEN METABOLIC PANEL: CPT | Performed by: STUDENT IN AN ORGANIZED HEALTH CARE EDUCATION/TRAINING PROGRAM

## 2023-05-11 PROCEDURE — 21400001 HC TELEMETRY ROOM

## 2023-05-11 PROCEDURE — 25000003 PHARM REV CODE 250: Performed by: STUDENT IN AN ORGANIZED HEALTH CARE EDUCATION/TRAINING PROGRAM

## 2023-05-11 PROCEDURE — 63600175 PHARM REV CODE 636 W HCPCS: Performed by: STUDENT IN AN ORGANIZED HEALTH CARE EDUCATION/TRAINING PROGRAM

## 2023-05-11 PROCEDURE — 27000221 HC OXYGEN, UP TO 24 HOURS

## 2023-05-11 PROCEDURE — 83735 ASSAY OF MAGNESIUM: CPT | Performed by: STUDENT IN AN ORGANIZED HEALTH CARE EDUCATION/TRAINING PROGRAM

## 2023-05-11 PROCEDURE — 25000003 PHARM REV CODE 250

## 2023-05-11 RX ORDER — METOPROLOL TARTRATE 50 MG/1
50 TABLET ORAL 2 TIMES DAILY
Status: DISCONTINUED | OUTPATIENT
Start: 2023-05-11 | End: 2023-05-16 | Stop reason: HOSPADM

## 2023-05-11 RX ORDER — HYDROMORPHONE HYDROCHLORIDE 1 MG/ML
1 INJECTION, SOLUTION INTRAMUSCULAR; INTRAVENOUS; SUBCUTANEOUS ONCE
Status: COMPLETED | OUTPATIENT
Start: 2023-05-11 | End: 2023-05-11

## 2023-05-11 RX ADMIN — VANCOMYCIN HYDROCHLORIDE 1000 MG: 1 INJECTION, POWDER, LYOPHILIZED, FOR SOLUTION INTRAVENOUS at 09:05

## 2023-05-11 RX ADMIN — METOPROLOL TARTRATE 50 MG: 50 TABLET, FILM COATED ORAL at 09:05

## 2023-05-11 RX ADMIN — MICAFUNGIN SODIUM 100 MG: 100 INJECTION, POWDER, LYOPHILIZED, FOR SOLUTION INTRAVENOUS at 01:05

## 2023-05-11 RX ADMIN — MUPIROCIN: 20 OINTMENT TOPICAL at 08:05

## 2023-05-11 RX ADMIN — HEPARIN SODIUM 5000 UNITS: 5000 INJECTION, SOLUTION INTRAVENOUS; SUBCUTANEOUS at 10:05

## 2023-05-11 RX ADMIN — CLINDAMYCIN IN 5 PERCENT DEXTROSE 900 MG: 18 INJECTION, SOLUTION INTRAVENOUS at 10:05

## 2023-05-11 RX ADMIN — SODIUM CHLORIDE, POTASSIUM CHLORIDE, SODIUM LACTATE AND CALCIUM CHLORIDE: 600; 310; 30; 20 INJECTION, SOLUTION INTRAVENOUS at 03:05

## 2023-05-11 RX ADMIN — HEPARIN SODIUM 5000 UNITS: 5000 INJECTION, SOLUTION INTRAVENOUS; SUBCUTANEOUS at 06:05

## 2023-05-11 RX ADMIN — HEPARIN SODIUM 5000 UNITS: 5000 INJECTION, SOLUTION INTRAVENOUS; SUBCUTANEOUS at 01:05

## 2023-05-11 RX ADMIN — VANCOMYCIN HYDROCHLORIDE 1000 MG: 1 INJECTION, POWDER, LYOPHILIZED, FOR SOLUTION INTRAVENOUS at 08:05

## 2023-05-11 RX ADMIN — CEFEPIME 2 G: 2 INJECTION, POWDER, FOR SOLUTION INTRAVENOUS at 03:05

## 2023-05-11 RX ADMIN — HYDROMORPHONE HYDROCHLORIDE 1 MG: 1 INJECTION, SOLUTION INTRAMUSCULAR; INTRAVENOUS; SUBCUTANEOUS at 10:05

## 2023-05-11 RX ADMIN — SODIUM CHLORIDE, POTASSIUM CHLORIDE, SODIUM LACTATE AND CALCIUM CHLORIDE 1000 ML: 600; 310; 30; 20 INJECTION, SOLUTION INTRAVENOUS at 06:05

## 2023-05-11 RX ADMIN — CLINDAMYCIN IN 5 PERCENT DEXTROSE 900 MG: 18 INJECTION, SOLUTION INTRAVENOUS at 04:05

## 2023-05-11 RX ADMIN — INSULIN ASPART 9 UNITS: 100 INJECTION, SOLUTION INTRAVENOUS; SUBCUTANEOUS at 09:05

## 2023-05-11 RX ADMIN — HYDROMORPHONE HYDROCHLORIDE 1 MG: 1 INJECTION, SOLUTION INTRAMUSCULAR; INTRAVENOUS; SUBCUTANEOUS at 04:05

## 2023-05-11 RX ADMIN — INSULIN ASPART 6 UNITS: 100 INJECTION, SOLUTION INTRAVENOUS; SUBCUTANEOUS at 04:05

## 2023-05-11 RX ADMIN — CLINDAMYCIN IN 5 PERCENT DEXTROSE 900 MG: 18 INJECTION, SOLUTION INTRAVENOUS at 07:05

## 2023-05-11 RX ADMIN — METOPROLOL TARTRATE 50 MG: 50 TABLET, FILM COATED ORAL at 08:05

## 2023-05-11 NOTE — PROGRESS NOTES
"Inpatient Nutrition Evaluation    Admit Date: 5/10/2023   Total duration of encounter: 1 day    Nutrition Recommendation/Prescription     When appropriate--ADAT to Diabetic/cardiac  Suggest order aramis bid /wound healing; Aramis (provides 90 kcal, 2.5 g protein per serving)   Suggest order boost max--1 per day; Boost Max (provides 160 kcal, 30 g protein per serving)   Pt education on diet/complete  Will monitor nutrition status     Nutrition Assessment     Chart Review    Reason Seen: continuous nutrition monitoring    Malnutrition Screening Tool Results   Have you recently lost weight without trying?: No  Have you been eating poorly because of a decreased appetite?: No   MST Score: 0     Diagnosis:  Fourniers gangrene, sepsis, S/P debridement necrotizing tissue/perineum     Relevant Medical History: CAD, DM, HTN, tobacco abuse     Nutrition-Related Medications: IVF LR @ 150ml/hr; maxipime, clindamycin, vancomycin     Nutrition-Related Labs:  () H/H 11.9/33.5(L) gluc 164(H) Bun 29.8(H) Cr 1.0 K 4.0 Alb 1.9(L)   (5-10) HgbA1c 9.7(H)     Diet Order: Diet NPO  Oral Supplement Order: none  Appetite/Oral Intake: NPO/NPO  Factors Affecting Nutritional Intake: NPO  Food/Anabaptism/Cultural Preferences: none reported  Food Allergies: none reported    Skin Integrity: incision, wound  Wound(s):   debridement necrotizing tissue/perineum     Comments    () Pt NPO; S/P debridement necrotizing tissue; pt reported good appetite PTA; no wt loss; pt will benefit with aramis/ boost max to promote wound healing. Pt not strict with diet PTA: discussed diabetic diet.     Anthropometrics    Height: 5' 10" (177.8 cm) Height Method: Stated  Last Weight: 117.9 kg (260 lb) (05/10/23 1337) Weight Method: Bed Scale  BMI (Calculated): 37.3  BMI Classification: obese grade II (BMI 35-39.9)        Ideal Body Weight (IBW), Male: 166 lb     % Ideal Body Weight, Male (lb): 156.63 %                 Usual Body Weight (UBW), k.9 kg  % Usual " Body Weight: 100.24     Usual Weight Provided By: patient and EMR weight history    Wt Readings from Last 5 Encounters:   05/10/23 117.9 kg (260 lb)   09/28/22 116.8 kg (257 lb 8 oz)   08/22/22 116.6 kg (257 lb)   08/15/22 114.5 kg (252 lb 6.8 oz)   06/24/22 116.1 kg (255 lb 15.3 oz)     Weight Change(s) Since Admission:  Admit Weight: 117.9 kg (260 lb) (05/10/23 1337)  No wt loss    Patient Education    Education Provided: diabetic diet  Teaching Method: explanation and printed materials  Comprehension: verbalizes understanding  Barriers to Learning: desire/motivation  Expected Compliance: fair  Comments: All questions were answered and dietitian's contact information was provided.     Monitoring & Evaluation     Dietitian will monitor food and beverage intake, weight, and food/nutrition knowledge skill.  Nutrition Risk/Follow-Up: low (follow-up in 5-7 days)  Patients assigned 'low nutrition risk' status do not qualify for a full nutritional assessment but will be monitored and re-evaluated in a 5-7 day time period. Please consult if re-evaluation needed sooner.

## 2023-05-11 NOTE — PROGRESS NOTES
Pharmacokinetic Initial Assessment: IV Vancomycin    Assessment/Plan:    Initiate intravenous vancomycin with loading dose of 2000 mg once with subsequent doses when random concentrations are less than 20 mcg/mL  Desired empiric serum trough concentration is 10 to 20 mcg/mL  Draw vancomycin random level on 5/11 at 0900.  Pharmacy will continue to follow and monitor vancomycin.      Please contact pharmacy at extension [phone] with any questions regarding this assessment.     Thank you for the consult,   Shimon Fieldswin       Patient brief summary:  John Addison is a 68 y.o. male initiated on antimicrobial therapy with IV Vancomycin for treatment of suspected skin & soft tissue infection    Drug Allergies:   Review of patient's allergies indicates:  No Known Allergies    Actual Body Weight:   117.9 kg    Renal Function:   Estimated Creatinine Clearance: 53.4 mL/min (A) (based on SCr of 1.68 mg/dL (H)).,       CBC (last 72 hours):  Recent Labs   Lab Result Units 05/10/23  1440 05/10/23  1448   WBC x10(3)/mcL 24.76*  --    Hgb g/dL 14.1  --    Hemoglobin A1c %  --  9.7*   Hct % 41.5*  --    Platelet x10(3)/mcL 284  --    Mono % % 6.9  --    Eos % % 0.1  --    Basophil % % 0.4  --        Metabolic Panel (last 72 hours):  Recent Labs   Lab Result Units 05/10/23  1440   Sodium Level mmol/L 133*   Potassium Level mmol/L 4.1   Chloride mmol/L 100   Carbon Dioxide mmol/L 18*   Glucose Level mg/dL 295*   Blood Urea Nitrogen mg/dL 32.6*   Creatinine mg/dL 1.68*   Albumin Level g/dL 2.4*   Bilirubin Total mg/dL 1.1   Alkaline Phosphatase unit/L 133   Aspartate Aminotransferase unit/L 26   Alanine Aminotransferase unit/L 25       Drug levels (last 3 results):  No results for input(s): VANCOMYCINRA, VANCORANDOM, VANCOMYCINPE, VANCOPEAK, VANCOMYCINTR, VANCOTROUGH in the last 72 hours.    Microbiologic Results:  Microbiology Results (last 7 days)       Procedure Component Value Units Date/Time    Gram Stain [[id number]] Collected:  05/10/23 1609    Order Status: Sent Specimen: Tissue from Scrotum Updated: 05/10/23 1815    Tissue Culture - Aerobic [[id number]] Collected: 05/10/23 1609    Order Status: Sent Specimen: Tissue from Scrotum Updated: 05/10/23 1642    Anaerobic Culture [[id number]] Collected: 05/10/23 1609    Order Status: Sent Specimen: Tissue from Scrotum Updated: 05/10/23 1641    Blood culture #1 **CANNOT BE ORDERED STAT** [[id number]] Collected: 05/10/23 1448    Order Status: Resulted Specimen: Blood from Arm, Right Updated: 05/10/23 1450    Blood culture #2 **CANNOT BE ORDERED STAT** [[id number]] Collected: 05/10/23 1448    Order Status: Resulted Specimen: Blood from Hand, Right Updated: 05/10/23 1450    Blood culture x two cultures. Draw prior to antibiotics. [[id number]] Collected: 05/10/23 1440    Order Status: Canceled Specimen: Blood from Arm, Right     Blood culture x two cultures. Draw prior to antibiotics. [[id number]]     Order Status: Canceled Specimen: Blood

## 2023-05-11 NOTE — PLAN OF CARE
05/11/23 1104   Discharge Assessment   Assessment Type Discharge Planning Assessment   Confirmed/corrected address, phone number and insurance Yes   Confirmed Demographics Correct on Facesheet   Source of Information patient   When was your last doctors appointment?   ([facility] Sammy)   Reason For Admission Yesi's gangrene, Sepsis   People in Home alone   Facility Arrived From: Home   Do you expect to return to your current living situation? Yes   Do you have help at home or someone to help you manage your care at home? Yes   Who are your caregiver(s) and their phone number(s)? Chen Dill (Daughter)   [phone]   Prior to hospitilization cognitive status: Alert/Oriented   Current cognitive status: Alert/Oriented   Equipment Currently Used at Home none   Readmission within 30 days? No   Patient currently being followed by outpatient case management? No   Do you currently have service(s) that help you manage your care at home? No   Do you take prescription medications? Yes  ([facility])   Do you have prescription coverage? Yes   Coverage M/D   Do you have any problems affording any of your prescribed medications? No   Is the patient taking medications as prescribed? yes   Who is going to help you get home at discharge? Family   How do you get to doctors appointments? family or friend will provide   Are you on dialysis? No   Discharge Plan A Home   DME Needed Upon Discharge  other (see comments)  (Pending PT eval)   Discharge Plan discussed with: Patient   Discharge Barriers Identified None   Physical Activity   On average, how many days per week do you engage in moderate to strenuous exercise (like a brisk walk)? 0 days   On average, how many minutes do you engage in exercise at this level? 0 min   Financial Resource Strain   How hard is it for you to pay for the very basics like food, housing, medical care, and heating? Not very   Housing Stability   In the last 12 months, was there a time when you were  not able to pay the mortgage or rent on time? N   In the last 12 months, how many places have you lived? 1   In the last 12 months, was there a time when you did not have a steady place to sleep or slept in a shelter (including now)? N   Transportation Needs   In the past 12 months, has lack of transportation kept you from medical appointments or from getting medications? no   In the past 12 months, has lack of transportation kept you from meetings, work, or from getting things needed for daily living? No   Food Insecurity   Within the past 12 months, you worried that your food would run out before you got the money to buy more. Never true   Within the past 12 months, the food you bought just didn't last and you didn't have money to get more. Never true   Stress   Do you feel stress - tense, restless, nervous, or anxious, or unable to sleep at night because your mind is troubled all the time - these days? Not at all   Social Connections   In a typical week, how many times do you talk on the phone with family, friends, or neighbors? More than 3   How often do you get together with friends or relatives? Three times   How often do you attend Caodaism or Spiritism services? More than 4  ([employer])   Do you belong to any clubs or organizations such as Caodaism groups, unions, fraternal or athletic groups, or school groups? No   How often do you attend meetings of the clubs or organizations you belong to? Never   Are you , , , , never , or living with a partner?    Alcohol Use   Q1: How often do you have a drink containing alcohol? 2-4 pr month   Q2: How many drinks containing alcohol do you have on a typical day when you are drinking? 1 or 2   Q3: How often do you have six or more drinks on one occasion? Never     Pt  but  from spouse, Ewelina Addison; Resides alone; 1 dghtr locally (Chen Dill [phone]); 2 sons in [place]; Pt receives SS  Disability & SNAP benefits; Independent with ADL's; CM to follow for d/c planning needs.

## 2023-05-11 NOTE — MEDICAL/APP STUDENT
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 One episode of hypotension to SBP 80s, however arterial line with poor waveform. Improved to 110s SBP with 1L bolus   Pain well controlled  NPO, but asking to eat/drink when able   No flatus. Last BM yesterday in OR  No significant bleeding or drainage on dressings   UOP 2.8L with stubbs in place    OBJECTIVE    Vitals  Temp:  [97.4 °F (36.3 °C)-98.8 °F (37.1 °C)] 98.8 °F (37.1 °C)  Pulse:  [102-130] 110  Resp:  [12-27] 26  SpO2:  [95 %-100 %] 95 %  BP: ()/(43-85) 112/62  Arterial Line BP: ()/(41-70) 98/42    Intake / Output  PO: 0  UO: 2770 cc  BM yesterday     Physical Exam:  GEN: comfortable, no acute distress  RESP: breathing comfortably on 2L NC   ABD: soft, nondistended, nontender  Skin: incisions clean and dry; appropriately tender in bilateral groins. Dressings present over perineum without significant drainage or bleeding or foul odor.   MSK: moves all extremities   Neuro: alert and oriented x3      Labs    Lab Results   Component Value Date    WBC 24.45 (H) 05/11/2023    HGB 11.9 (L) 05/11/2023    HCT 33.5 (L) 05/11/2023    MCV 77.7 (L) 05/11/2023     05/11/2023           Recent Labs     05/10/23  1440 05/10/23  2005 05/11/23  0327   * 136 138   K 4.1 3.8 4.0   CO2 18* 22* 19*   BUN 32.6* 31.2* 29.8*   CREATININE 1.68* 1.20* 1.09   CALCIUM 10.3* 9.0 8.9   MG  --   --  2.00   PHOS  --   --  3.5   ALBUMIN 2.4* 1.9* 1.9*   BILITOT 1.1 0.8 1.1   AST 26 23 24   ALKPHOS 133 86 89   ALT 25 21 21          Micro  Microbiology Results (last 7 days)       Procedure Component Value Units Date/Time    Tissue Culture - Aerobic [[id number]] Collected: 05/10/23 1609    Order Status: Completed Specimen: Tissue from  Scrotum Updated: 05/11/23 0631     CULTURE, TISSUE- AEROBIC ([facility]) No Growth At 24 Hours    Urine culture [[id number]] Collected: 05/10/23 2342    Order Status: Sent Specimen: Urine Updated: 05/11/23 0127    Gram Stain [[id number]] Collected: 05/10/23 1609    Order Status: Sent Specimen: Tissue from Scrotum Updated: 05/10/23 1815    Anaerobic Culture [[id number]] Collected: 05/10/23 1609    Order Status: Sent Specimen: Tissue from Scrotum Updated: 05/10/23 1641    Blood culture #1 **CANNOT BE ORDERED STAT** [[id number]] Collected: 05/10/23 1448    Order Status: Resulted Specimen: Blood from Arm, Right Updated: 05/10/23 1450    Blood culture #2 **CANNOT BE ORDERED STAT** [[id number]] Collected: 05/10/23 1448    Order Status: Resulted Specimen: Blood from Hand, Right Updated: 05/10/23 1450    Blood culture x two cultures. Draw prior to antibiotics. [[id number]] Collected: 05/10/23 1440    Order Status: Canceled Specimen: Blood from Arm, Right     Blood culture x two cultures. Draw prior to antibiotics. [[id number]]     Order Status: Canceled Specimen: Blood              Imaging      ASSESSMENT & PLAN  68 year old male POD 1 s/p debridement for Yesi's gangrene     - NPO until after bedside dressing change today. If wound appears healthy, will defer further operative debridement. Wound care following for assistance and plans to be at bedside for dressing change   - resume diet once no operative intervention decided   - continue Clinda, Vanc, Cefepime - will follow cx to tailor abx  - wean O2 nasal cannula as tolerated, encourage IS, cough, deep breathing   - maintain stubbs for accurate UOP and maintaining clean wounds   - discontinue arterial line   - SSI for DM management   - PT/OT once appropriate   - heparin DVT ppx   - home metoprolol restarted     Lloyd Everett MS3    5/11/2023  5:53 AM     PGY3 Attestation   I have seen and examined the patient with the medical student above. I agree with the above  documentation and the note was edited as necessary.  Sarah Rosas MD   [facility] General Surgery PGY 3

## 2023-05-11 NOTE — MEDICAL/APP STUDENT
General Surgery  Daily Progress Note     HD#1  POD#1 Day Post-Op    SUBJECTIVE / INTERVAL EVENTS  68 year old male at post op day 1 for debridement of necrotic perineal and scrotal tissue concerning for yasmin's gangrene     NAEON AF VSS  Pain well controlled  Tolerating diet with no nausea or vomiting  Passing gas, last BM    OBJECTIVE    Vitals  Temp:  [97.4 °F (36.3 °C)-98.8 °F (37.1 °C)] 98.8 °F (37.1 °C)  Pulse:  [102-130] 119  Resp:  [12-27] 22  SpO2:  [95 %-100 %] 95 %  BP: ()/(43-85) 113/67  Arterial Line BP: ()/(41-70) 98/42    Intake / Output  PO:   UO: 2770 cc  Last Bowel Movement: 05/08/23  Drain Output:     Physical Exam:  GEN: comfortable, no acute distress  RESP: breathing comfortably on room air  ABD: soft, nondistended, nontender  Skin: incisions clean and dry; appropriately tender     Labs    Lab Results   Component Value Date    WBC 27.11 (H) 05/10/2023    HGB 12.1 (L) 05/10/2023    HCT 35.2 (L) 05/10/2023    MCV 78.4 (L) 05/10/2023     05/10/2023           Recent Labs     05/10/23  1440 05/10/23  2005 05/11/23  0327   * 136 138   K 4.1 3.8 4.0   CO2 18* 22* 19*   BUN 32.6* 31.2* 29.8*   CREATININE 1.68* 1.20* 1.09   CALCIUM 10.3* 9.0 8.9   MG  --   --  2.00   PHOS  --   --  3.5   ALBUMIN 2.4* 1.9* 1.9*   BILITOT 1.1 0.8 1.1   AST 26 23 24   ALKPHOS 133 86 89   ALT 25 21 21        Micro  Microbiology Results (last 7 days)       Procedure Component Value Units Date/Time    Urine culture [[id number]] Collected: 05/10/23 2342    Order Status: Sent Specimen: Urine Updated: 05/11/23 0127    Gram Stain [[id number]] Collected: 05/10/23 1609    Order Status: Sent Specimen: Tissue from Scrotum Updated: 05/10/23 1815    Tissue Culture - Aerobic [[id number]] Collected: 05/10/23 1609    Order Status: Sent Specimen: Tissue from Scrotum Updated: 05/10/23 1642    Anaerobic Culture [[id number]] Collected: 05/10/23 1609    Order Status: Sent Specimen: Tissue from Scrotum Updated: 05/10/23  1641    Blood culture #1 **CANNOT BE ORDERED STAT** [[id number]] Collected: 05/10/23 1448    Order Status: Resulted Specimen: Blood from Arm, Right Updated: 05/10/23 1450    Blood culture #2 **CANNOT BE ORDERED STAT** [[id number]] Collected: 05/10/23 1448    Order Status: Resulted Specimen: Blood from Hand, Right Updated: 05/10/23 1450    Blood culture x two cultures. Draw prior to antibiotics. [[id number]] Collected: 05/10/23 1440    Order Status: Canceled Specimen: Blood from Arm, Right     Blood culture x two cultures. Draw prior to antibiotics. [[id number]]     Order Status: Canceled Specimen: Blood              Imaging      ASSESSMENT & PLAN  68 year old male at post op day 1 with concern for yasmin's gangrene due to necrotic perineal and scrotal tissue who appears in NAD this morning and has well controlled pain    Lloyd Everett MS3    5/11/2023  5:53 AM

## 2023-05-11 NOTE — PLAN OF CARE
Problem: Diabetes Comorbidity  Goal: Blood Glucose Level Within Targeted Range  Outcome: Ongoing, Progressing  Intervention: Monitor and Manage Glycemia  Flowsheets (Taken 5/11/2023 1828)  Glycemic Management:   blood glucose monitored   supplemental insulin given     Problem: Infection  Goal: Absence of Infection Signs and Symptoms  Outcome: Ongoing, Progressing  Intervention: Prevent or Manage Infection  Flowsheets (Taken 5/11/2023 1828)  Infection Management: aseptic technique maintained  Isolation Precautions: protective     Problem: Adult Inpatient Plan of Care  Goal: Plan of Care Review  Outcome: Ongoing, Progressing  Flowsheets (Taken 5/11/2023 1828)  Plan of Care Reviewed With: patient  Goal: Optimal Comfort and Wellbeing  Outcome: Ongoing, Progressing     Problem: Pain Acute  Goal: Acceptable Pain Control and Functional Ability  Outcome: Ongoing, Progressing  Intervention: Develop Pain Management Plan  Flowsheets (Taken 5/11/2023 1828)  Pain Management Interventions:   quiet environment facilitated   pillow support provided  Intervention: Prevent or Manage Pain  Flowsheets (Taken 5/11/2023 1828)  Bowel Elimination Promotion:   adequate fluid intake promoted   diet adjusted  Medication Review/Management: medications reviewed

## 2023-05-11 NOTE — CONSULTS
Patient is seen at bedside to change dressing with general surgery. Pt tolerated with pain meds. Open wounds seems mostly contained to posterior scrotum. No radhika necrosis noted. Plans for OR assessment and washout. Surgery to manage wound till Monday. Wound care coverage to reach out with assistance as needed. Possible wound vac next week. Pt would benefit from LTAC after DC for extensive wound care.

## 2023-05-12 LAB
ALBUMIN SERPL-MCNC: 1.8 G/DL (ref 3.4–4.8)
ALBUMIN/GLOB SERPL: 0.4 RATIO (ref 1.1–2)
ALP SERPL-CCNC: 123 UNIT/L (ref 40–150)
ALT SERPL-CCNC: 25 UNIT/L (ref 0–55)
AST SERPL-CCNC: 27 UNIT/L (ref 5–34)
BASOPHILS # BLD AUTO: 0.06 X10(3)/MCL
BASOPHILS NFR BLD AUTO: 0.3 %
BILIRUBIN DIRECT+TOT PNL SERPL-MCNC: 0.6 MG/DL
BUN SERPL-MCNC: 20 MG/DL (ref 8.4–25.7)
CALCIUM SERPL-MCNC: 8.9 MG/DL (ref 8.8–10)
CHLORIDE SERPL-SCNC: 108 MMOL/L (ref 98–107)
CO2 SERPL-SCNC: 21 MMOL/L (ref 23–31)
CREAT SERPL-MCNC: 0.96 MG/DL (ref 0.73–1.18)
EOSINOPHIL # BLD AUTO: 0.13 X10(3)/MCL (ref 0–0.9)
EOSINOPHIL NFR BLD AUTO: 0.6 %
ERYTHROCYTE [DISTWIDTH] IN BLOOD BY AUTOMATED COUNT: 16.1 % (ref 11.5–17)
ESTROGEN SERPL-MCNC: NORMAL PG/ML
GFR SERPLBLD CREATININE-BSD FMLA CKD-EPI: >60 MLS/MIN/1.73/M2
GLOBULIN SER-MCNC: 5.1 GM/DL (ref 2.4–3.5)
GLUCOSE SERPL-MCNC: 196 MG/DL (ref 82–115)
HCT VFR BLD AUTO: 35.2 % (ref 42–52)
HGB BLD-MCNC: 11.5 G/DL (ref 14–18)
IMM GRANULOCYTES # BLD AUTO: 0.29 X10(3)/MCL (ref 0–0.04)
IMM GRANULOCYTES NFR BLD AUTO: 1.4 %
INSULIN SERPL-ACNC: NORMAL U[IU]/ML
LAB AP CLINICAL INFORMATION: NORMAL
LAB AP GROSS DESCRIPTION: NORMAL
LAB AP REPORT FOOTNOTES: NORMAL
LYMPHOCYTES # BLD AUTO: 2.31 X10(3)/MCL (ref 0.6–4.6)
LYMPHOCYTES NFR BLD AUTO: 11.4 %
MAGNESIUM SERPL-MCNC: 2 MG/DL (ref 1.6–2.6)
MCH RBC QN AUTO: 26.1 PG (ref 27–31)
MCHC RBC AUTO-ENTMCNC: 32.7 G/DL (ref 33–36)
MCV RBC AUTO: 80 FL (ref 80–94)
MONOCYTES # BLD AUTO: 1.62 X10(3)/MCL (ref 0.1–1.3)
MONOCYTES NFR BLD AUTO: 8 %
NEUTROPHILS # BLD AUTO: 15.94 X10(3)/MCL (ref 2.1–9.2)
NEUTROPHILS NFR BLD AUTO: 78.3 %
NRBC BLD AUTO-RTO: 0 %
PHOSPHATE SERPL-MCNC: 2.1 MG/DL (ref 2.3–4.7)
PLATELET # BLD AUTO: 250 X10(3)/MCL (ref 130–400)
PMV BLD AUTO: 11.1 FL (ref 7.4–10.4)
POCT GLUCOSE: 202 MG/DL (ref 70–110)
POCT GLUCOSE: 217 MG/DL (ref 70–110)
POCT GLUCOSE: 267 MG/DL (ref 70–110)
POCT GLUCOSE: 288 MG/DL (ref 70–110)
POCT GLUCOSE: 297 MG/DL (ref 70–110)
POTASSIUM SERPL-SCNC: 4.1 MMOL/L (ref 3.5–5.1)
PROT SERPL-MCNC: 6.9 GM/DL (ref 5.8–7.6)
RBC # BLD AUTO: 4.4 X10(6)/MCL (ref 4.7–6.1)
SODIUM SERPL-SCNC: 136 MMOL/L (ref 136–145)
T3RU NFR SERPL: NORMAL %
VANCOMYCIN TROUGH SERPL-MCNC: 13.6 UG/ML (ref 15–20)
WBC # SPEC AUTO: 20.35 X10(3)/MCL (ref 4.5–11.5)

## 2023-05-12 PROCEDURE — 25000003 PHARM REV CODE 250

## 2023-05-12 PROCEDURE — 83735 ASSAY OF MAGNESIUM: CPT | Performed by: STUDENT IN AN ORGANIZED HEALTH CARE EDUCATION/TRAINING PROGRAM

## 2023-05-12 PROCEDURE — 84100 ASSAY OF PHOSPHORUS: CPT | Performed by: STUDENT IN AN ORGANIZED HEALTH CARE EDUCATION/TRAINING PROGRAM

## 2023-05-12 PROCEDURE — 21400001 HC TELEMETRY ROOM

## 2023-05-12 PROCEDURE — 63600175 PHARM REV CODE 636 W HCPCS

## 2023-05-12 PROCEDURE — 25000003 PHARM REV CODE 250: Performed by: STUDENT IN AN ORGANIZED HEALTH CARE EDUCATION/TRAINING PROGRAM

## 2023-05-12 PROCEDURE — 80053 COMPREHEN METABOLIC PANEL: CPT | Performed by: STUDENT IN AN ORGANIZED HEALTH CARE EDUCATION/TRAINING PROGRAM

## 2023-05-12 PROCEDURE — 63600175 PHARM REV CODE 636 W HCPCS: Performed by: STUDENT IN AN ORGANIZED HEALTH CARE EDUCATION/TRAINING PROGRAM

## 2023-05-12 PROCEDURE — 85025 COMPLETE CBC W/AUTO DIFF WBC: CPT

## 2023-05-12 PROCEDURE — 80202 ASSAY OF VANCOMYCIN: CPT | Performed by: STUDENT IN AN ORGANIZED HEALTH CARE EDUCATION/TRAINING PROGRAM

## 2023-05-12 PROCEDURE — 94761 N-INVAS EAR/PLS OXIMETRY MLT: CPT

## 2023-05-12 PROCEDURE — 27000221 HC OXYGEN, UP TO 24 HOURS

## 2023-05-12 RX ORDER — HYDROMORPHONE HYDROCHLORIDE 1 MG/ML
0.5 INJECTION, SOLUTION INTRAMUSCULAR; INTRAVENOUS; SUBCUTANEOUS ONCE
Status: COMPLETED | OUTPATIENT
Start: 2023-05-12 | End: 2023-05-12

## 2023-05-12 RX ORDER — ENOXAPARIN SODIUM 100 MG/ML
40 INJECTION SUBCUTANEOUS EVERY 24 HOURS
Status: DISCONTINUED | OUTPATIENT
Start: 2023-05-12 | End: 2023-05-16 | Stop reason: HOSPADM

## 2023-05-12 RX ORDER — SODIUM,POTASSIUM PHOSPHATES 280-250MG
2 POWDER IN PACKET (EA) ORAL 2 TIMES DAILY
Status: COMPLETED | OUTPATIENT
Start: 2023-05-12 | End: 2023-05-12

## 2023-05-12 RX ORDER — HYDROMORPHONE HYDROCHLORIDE 1 MG/ML
0.5 INJECTION, SOLUTION INTRAMUSCULAR; INTRAVENOUS; SUBCUTANEOUS ONCE
Status: DISCONTINUED | OUTPATIENT
Start: 2023-05-12 | End: 2023-05-12

## 2023-05-12 RX ADMIN — INSULIN ASPART 9 UNITS: 100 INJECTION, SOLUTION INTRAVENOUS; SUBCUTANEOUS at 05:05

## 2023-05-12 RX ADMIN — INSULIN ASPART 9 UNITS: 100 INJECTION, SOLUTION INTRAVENOUS; SUBCUTANEOUS at 12:05

## 2023-05-12 RX ADMIN — ENOXAPARIN SODIUM 40 MG: 40 INJECTION SUBCUTANEOUS at 06:05

## 2023-05-12 RX ADMIN — SODIUM CHLORIDE, POTASSIUM CHLORIDE, SODIUM LACTATE AND CALCIUM CHLORIDE: 600; 310; 30; 20 INJECTION, SOLUTION INTRAVENOUS at 03:05

## 2023-05-12 RX ADMIN — SODIUM PHOSPHATE, MONOBASIC, MONOHYDRATE AND SODIUM PHOSPHATE, DIBASIC, ANHYDROUS 30 MMOL: 142; 276 INJECTION, SOLUTION INTRAVENOUS at 07:05

## 2023-05-12 RX ADMIN — METOPROLOL TARTRATE 50 MG: 50 TABLET, FILM COATED ORAL at 08:05

## 2023-05-12 RX ADMIN — POTASSIUM, SODIUM PHOSPHATES 280 MG-160 MG-250 MG ORAL POWDER PACKET 2 PACKET: POWDER IN PACKET at 11:05

## 2023-05-12 RX ADMIN — INSULIN ASPART 4 UNITS: 100 INJECTION, SOLUTION INTRAVENOUS; SUBCUTANEOUS at 12:05

## 2023-05-12 RX ADMIN — VANCOMYCIN HYDROCHLORIDE 1250 MG: 1 INJECTION, POWDER, LYOPHILIZED, FOR SOLUTION INTRAVENOUS at 08:05

## 2023-05-12 RX ADMIN — MICAFUNGIN SODIUM 100 MG: 100 INJECTION, POWDER, LYOPHILIZED, FOR SOLUTION INTRAVENOUS at 01:05

## 2023-05-12 RX ADMIN — HYDROMORPHONE HYDROCHLORIDE 1 MG: 1 INJECTION, SOLUTION INTRAMUSCULAR; INTRAVENOUS; SUBCUTANEOUS at 09:05

## 2023-05-12 RX ADMIN — OXYCODONE HYDROCHLORIDE 5 MG: 5 TABLET ORAL at 11:05

## 2023-05-12 RX ADMIN — HYDROMORPHONE HYDROCHLORIDE 0.5 MG: 1 INJECTION, SOLUTION INTRAMUSCULAR; INTRAVENOUS; SUBCUTANEOUS at 11:05

## 2023-05-12 RX ADMIN — CLINDAMYCIN IN 5 PERCENT DEXTROSE 900 MG: 18 INJECTION, SOLUTION INTRAVENOUS at 03:05

## 2023-05-12 RX ADMIN — INSULIN ASPART 6 UNITS: 100 INJECTION, SOLUTION INTRAVENOUS; SUBCUTANEOUS at 06:05

## 2023-05-12 RX ADMIN — METOPROLOL TARTRATE 50 MG: 50 TABLET, FILM COATED ORAL at 09:05

## 2023-05-12 RX ADMIN — VANCOMYCIN HYDROCHLORIDE 1250 MG: 1 INJECTION, POWDER, LYOPHILIZED, FOR SOLUTION INTRAVENOUS at 09:05

## 2023-05-12 RX ADMIN — POTASSIUM, SODIUM PHOSPHATES 280 MG-160 MG-250 MG ORAL POWDER PACKET 2 PACKET: POWDER IN PACKET at 08:05

## 2023-05-12 RX ADMIN — CEFEPIME 2 G: 2 INJECTION, POWDER, FOR SOLUTION INTRAVENOUS at 03:05

## 2023-05-12 RX ADMIN — HYDROMORPHONE HYDROCHLORIDE 1 MG: 1 INJECTION, SOLUTION INTRAMUSCULAR; INTRAVENOUS; SUBCUTANEOUS at 12:05

## 2023-05-12 RX ADMIN — HEPARIN SODIUM 5000 UNITS: 5000 INJECTION, SOLUTION INTRAVENOUS; SUBCUTANEOUS at 06:05

## 2023-05-12 RX ADMIN — CLINDAMYCIN IN 5 PERCENT DEXTROSE 900 MG: 18 INJECTION, SOLUTION INTRAVENOUS at 06:05

## 2023-05-12 RX ADMIN — MUPIROCIN: 20 OINTMENT TOPICAL at 09:05

## 2023-05-12 NOTE — PLAN OF CARE
Problem: Diabetes Comorbidity  Goal: Blood Glucose Level Within Targeted Range  Outcome: Ongoing, Progressing     Problem: Infection  Goal: Absence of Infection Signs and Symptoms  Outcome: Ongoing, Progressing     Problem: Adult Inpatient Plan of Care  Goal: Plan of Care Review  Outcome: Ongoing, Progressing  Goal: Patient-Specific Goal (Individualized)  Outcome: Ongoing, Progressing  Goal: Absence of Hospital-Acquired Illness or Injury  Outcome: Ongoing, Progressing  Goal: Optimal Comfort and Wellbeing  Outcome: Ongoing, Progressing  Goal: Readiness for Transition of Care  Outcome: Ongoing, Progressing     Problem: Fall Injury Risk  Goal: Absence of Fall and Fall-Related Injury  Outcome: Ongoing, Progressing     Problem: Pain Acute  Goal: Acceptable Pain Control and Functional Ability  Outcome: Ongoing, Progressing     Problem: Skin Injury Risk Increased  Goal: Skin Health and Integrity  Outcome: Ongoing, Progressing

## 2023-05-12 NOTE — PROGRESS NOTES
Discussed consult for home wound vac with Olga Schmid, wound care RN. Olga stated wound care out until Monday, and is too soon for wound vac at this time. Olga will f/u with physician, as wound vacs are arranged through wound care.

## 2023-05-12 NOTE — PROGRESS NOTES
Pharmacokinetic Assessment Follow Up: IV Vancomycin    Vancomycin serum concentration assessment(s):    The trough level was drawn correctly and can be used to guide therapy at this time. The measurement is below the desired definitive target range of 15 to 20 mcg/mL.    Vancomycin Regimen Plan:    Change regimen to Vancomycin 1250 mg IV every 12 hours with next serum trough concentration measured at 2000 prior to 4th dose on 5/13/2023    Drug levels (last 3 results):  Recent Labs   Lab Result Units 05/12/23  0754   Vancomycin Trough ug/ml 13.6*       Pharmacy will continue to follow and monitor vancomycin.    Please contact pharmacy at extension [phone] for questions regarding this assessment.    Thank you for the consult,   Tram Do       Patient brief summary:  John Addison is a 68 y.o. male initiated on antimicrobial therapy with IV Vancomycin for treatment of  Gangrene    The patient's current regimen is 1250 mg IV q12h    Drug Allergies:   Review of patient's allergies indicates:  No Known Allergies    Actual Body Weight:   117.9 kg    Renal Function:   Estimated Creatinine Clearance: 93.5 mL/min (based on SCr of 0.96 mg/dL).,     Dialysis Method (if applicable):  N/A    CBC (last 72 hours):  Recent Labs   Lab Result Units 05/10/23  1440 05/10/23  1448 05/10/23  2005 05/11/23  0702 05/12/23  0350   WBC x10(3)/mcL 24.76*  --  27.11* 24.45* 20.35*   Hgb g/dL 14.1  --  12.1* 11.9* 11.5*   Hemoglobin A1c %  --  9.7*  --   --   --    Hct % 41.5*  --  35.2* 33.5* 35.2*   Platelet x10(3)/mcL 284  --  231 250 250   Mono % % 6.9  --   --   --  8.0   Eos % % 0.1  --   --   --  0.6   Basophil % % 0.4  --   --   --  0.3       Metabolic Panel (last 72 hours):  Recent Labs   Lab Result Units 05/10/23  1440 05/10/23  2005 05/10/23  2342 05/11/23  0327 05/12/23  0320   Sodium Level mmol/L 133* 136  --  138 136   Potassium Level mmol/L 4.1 3.8  --  4.0 4.1   Chloride mmol/L 100 105  --  107 108*   Carbon Dioxide mmol/L 18* 22*   --  19* 21*   Glucose Level mg/dL 295* 174*  --  164* 196*   Glucose, UA mg/dL  --   --  4+*  --   --    Blood Urea Nitrogen mg/dL 32.6* 31.2*  --  29.8* 20.0   Creatinine mg/dL 1.68* 1.20*  --  1.09 0.96   Albumin Level g/dL 2.4* 1.9*  --  1.9* 1.8*   Bilirubin Total mg/dL 1.1 0.8  --  1.1 0.6   Alkaline Phosphatase unit/L 133 86  --  89 123   Aspartate Aminotransferase unit/L 26 23  --  24 27   Alanine Aminotransferase unit/L 25 21  --  21 25   Magnesium Level mg/dL  --   --   --  2.00 2.00   Phosphorus Level mg/dL  --   --   --  3.5 2.1*       Vancomycin Administrations:  vancomycin given in the last 96 hours                     vancomycin (VANCOCIN) 1,000 mg in sodium chloride 0.9% 250 mL IVPB (mg) 1,000 mg New Bag 05/11/23 2015     1,000 mg New Bag  0936    vancomycin (VANCOCIN) 2,000 mg in sodium chloride 0.9% 500 mL IVPB (mg) 2,000 mg New Bag 05/10/23 1603                    Microbiologic Results:  Microbiology Results (last 7 days)       Procedure Component Value Units Date/Time    Anaerobic Culture [[id number]] Collected: 05/10/23 1609    Order Status: Completed Specimen: Tissue from Scrotum Updated: 05/12/23 0737     Anaerobe Culture No Anaerobes Isolated    Urine culture [[id number]] Collected: 05/10/23 2342    Order Status: Completed Specimen: Urine Updated: 05/12/23 0714     Urine Culture No Growth At 24 Hours    Blood culture #1 **CANNOT BE ORDERED STAT** [[id number]]  (Normal) Collected: 05/10/23 1448    Order Status: Completed Specimen: Blood from Arm, Right Updated: 05/11/23 2000     CULTURE, BLOOD ([facility]) No Growth At 24 Hours    Blood culture #2 **CANNOT BE ORDERED STAT** [[id number]]  (Normal) Collected: 05/10/23 1448    Order Status: Completed Specimen: Blood from Hand, Right Updated: 05/11/23 2000     CULTURE, BLOOD ([facility]) No Growth At 24 Hours    Gram Stain [[id number]] Collected: 05/10/23 1609    Order Status: Completed Specimen: Tissue from Scrotum Updated: 05/11/23 1000     GRAM STAIN Few WBC  observed      Many Gram Positive Rods      Many Gram Negative Rods      Few Gram positive cocci    Tissue Culture - Aerobic [[id number]] Collected: 05/10/23 1609    Order Status: Completed Specimen: Tissue from Scrotum Updated: 05/11/23 0631     CULTURE, TISSUE- AEROBIC ([facility]) No Growth At 24 Hours    Blood culture x two cultures. Draw prior to antibiotics. [[id number]] Collected: 05/10/23 1440    Order Status: Canceled Specimen: Blood from Arm, Right     Blood culture x two cultures. Draw prior to antibiotics. [[id number]]     Order Status: Canceled Specimen: Blood

## 2023-05-12 NOTE — PROGRESS NOTES
General Surgery  Daily Progress Note     HD#2  POD#2 Days Post-Op    SUBJECTIVE / INTERVAL EVENTS  NAEON, AF, tachycardic to 116  Today patient reports pain is improved since yesterday, patient has also had 2 episodes of diarrhea since yesterday, but feels overall well and has not had any nausea, vomiting, or fevers; otherwise has no new complaints  Dressings changed at bedside yesterday, wound bed appeared healthy with no further necrotic tissue  Pain well controlled  Tolerating diet with no nausea or vomiting    OBJECTIVE    Vitals  Temp:  [97.8 °F (36.6 °C)-98.8 °F (37.1 °C)] 98.3 °F (36.8 °C)  Pulse:  [] 98  Resp:  [4-34] 20  SpO2:  [94 %-97 %] 96 %  BP: (100-145)/(56-88) 124/75  Arterial Line BP: (102-120)/(65-91) 102/91    Intake / Output  PO: 35 cc  UO: 2425 cc  Last Bowel Movement: 05/10/23  Drain Output:     Physical Exam:  GEN: comfortable, no acute distress  RESP: breathing comfortably on room air  ABD: soft, nondistended, nontender  Groin: scrotal/perineal dressings c/d/i, no strikethrough; mild pain but well controlled; stubbs catheter in place with straw colored urine     Labs    Lab Results   Component Value Date    WBC 20.35 (H) 05/12/2023    HGB 11.5 (L) 05/12/2023    HCT 35.2 (L) 05/12/2023    MCV 80.0 05/12/2023     05/12/2023       Recent Labs     05/10/23  1440 05/10/23  2005 05/11/23  0327 05/12/23  0320   * 136 138 136   K 4.1 3.8 4.0 4.1   CO2 18* 22* 19* 21*   BUN 32.6* 31.2* 29.8* 20.0   CREATININE 1.68* 1.20* 1.09 0.96   CALCIUM 10.3* 9.0 8.9 8.9   MG  --   --  2.00 2.00   PHOS  --   --  3.5 2.1*   ALBUMIN 2.4* 1.9* 1.9* 1.8*   BILITOT 1.1 0.8 1.1 0.6   AST 26 23 24 27   ALKPHOS 133 86 89 123   ALT 25 21 21 25        Micro  Microbiology Results (last 7 days)       Procedure Component Value Units Date/Time    Blood culture #1 **CANNOT BE ORDERED STAT** [[id number]]  (Normal) Collected: 05/10/23 1448    Order Status: Completed Specimen: Blood from Arm, Right Updated:  05/11/23 2000     CULTURE, BLOOD ([facility]) No Growth At 24 Hours    Blood culture #2 **CANNOT BE ORDERED STAT** [[id number]]  (Normal) Collected: 05/10/23 1448    Order Status: Completed Specimen: Blood from Hand, Right Updated: 05/11/23 2000     CULTURE, BLOOD ([facility]) No Growth At 24 Hours    Gram Stain [[id number]] Collected: 05/10/23 1609    Order Status: Completed Specimen: Tissue from Scrotum Updated: 05/11/23 1000     GRAM STAIN Few WBC observed      Many Gram Positive Rods      Many Gram Negative Rods      Few Gram positive cocci    Tissue Culture - Aerobic [[id number]] Collected: 05/10/23 1609    Order Status: Completed Specimen: Tissue from Scrotum Updated: 05/11/23 0631     CULTURE, TISSUE- AEROBIC ([facility]) No Growth At 24 Hours    Urine culture [[id number]] Collected: 05/10/23 2342    Order Status: Sent Specimen: Urine Updated: 05/11/23 0127    Anaerobic Culture [[id number]] Collected: 05/10/23 1609    Order Status: Sent Specimen: Tissue from Scrotum Updated: 05/10/23 1641    Blood culture x two cultures. Draw prior to antibiotics. [[id number]] Collected: 05/10/23 1440    Order Status: Canceled Specimen: Blood from Arm, Right     Blood culture x two cultures. Draw prior to antibiotics. 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 Wound with no further necrotic tissues on dressing change yesterday.     -Will hold off on further surgical debridement given appearance of wound yesterday  -Continue IV abx, antifungals  -Dressing change at bedside today  -Diabetic diet  -Hospital medicine consulted for patient's poorly controlled diabetes  -Continue stubbs  -Continue high dose SSI  -Pulmonary toilet  -PT/OT  -Lovenox for DVT ppx    Lloyd Everett  MS3  5/12/2023  5:43 AM     PGY1  Attestation   I have seen and examined the patient with the medical student above. I agree with the above documentation and the note was edited as necessary.    César Al MD  [facility] General Surgery

## 2023-05-12 NOTE — MEDICAL/APP STUDENT
General Surgery  Daily Progress Note     HD#2  POD#2 Days Post-Op    SUBJECTIVE / INTERVAL EVENTS  68 year old male post op day 2 for debridement of necrotic perineal and scrotal tissue concerning for yasmin's gangrene    Today patient reports pain is improved since yesterday, patient has also had 2 episodes of diarrhea since yesterday, but feels overall well and has not had any nausea, vomiting, or fevers; otherwise has no new complaints  NAEON AF VSS  Pain well controlled  Tolerating diet with no nausea or vomiting  Passing gas, last BM    OBJECTIVE    Vitals  Temp:  [97.8 °F (36.6 °C)-98.8 °F (37.1 °C)] 98.3 °F (36.8 °C)  Pulse:  [] 98  Resp:  [4-34] 20  SpO2:  [94 %-97 %] 96 %  BP: (100-145)/(56-88) 124/75  Arterial Line BP: (102-120)/(65-91) 102/91    Intake / Output  PO: 35 cc  UO: 2425 cc  Last Bowel Movement: 05/10/23  Drain Output:     Physical Exam:  GEN: comfortable, no acute distress  RESP: breathing comfortably on room air  ABD: soft, nondistended, nontender  Groin: Mild tenderness at groin and perineum, operative area with no significant discharge or bleeding     Labs    Lab Results   Component Value Date    WBC 24.45 (H) 05/11/2023    HGB 11.9 (L) 05/11/2023    HCT 33.5 (L) 05/11/2023    MCV 77.7 (L) 05/11/2023     05/11/2023           Recent Labs     05/10/23  1440 05/10/23  2005 05/11/23  0327 05/12/23  0320   * 136 138 136   K 4.1 3.8 4.0 4.1   CO2 18* 22* 19* 21*   BUN 32.6* 31.2* 29.8* 20.0   CREATININE 1.68* 1.20* 1.09 0.96   CALCIUM 10.3* 9.0 8.9 8.9   MG  --   --  2.00 2.00   PHOS  --   --  3.5 2.1*   ALBUMIN 2.4* 1.9* 1.9* 1.8*   BILITOT 1.1 0.8 1.1 0.6   AST 26 23 24 27   ALKPHOS 133 86 89 123   ALT 25 21 21 25        Micro  Microbiology Results (last 7 days)       Procedure Component Value Units Date/Time    Blood culture #1 **CANNOT BE ORDERED STAT** [[id number]]  (Normal) Collected: 05/10/23 1448    Order Status: Completed Specimen: Blood from Arm, Right Updated:  05/11/23 2000     CULTURE, BLOOD ([facility]) No Growth At 24 Hours    Blood culture #2 **CANNOT BE ORDERED STAT** [[id number]]  (Normal) Collected: 05/10/23 1448    Order Status: Completed Specimen: Blood from Hand, Right Updated: 05/11/23 2000     CULTURE, BLOOD ([facility]) No Growth At 24 Hours    Gram Stain [[id number]] Collected: 05/10/23 1609    Order Status: Completed Specimen: Tissue from Scrotum Updated: 05/11/23 1000     GRAM STAIN Few WBC observed      Many Gram Positive Rods      Many Gram Negative Rods      Few Gram positive cocci    Tissue Culture - Aerobic [[id number]] Collected: 05/10/23 1609    Order Status: Completed Specimen: Tissue from Scrotum Updated: 05/11/23 0631     CULTURE, TISSUE- AEROBIC ([facility]) No Growth At 24 Hours    Urine culture [[id number]] Collected: 05/10/23 2342    Order Status: Sent Specimen: Urine Updated: 05/11/23 0127    Anaerobic Culture [[id number]] Collected: 05/10/23 1609    Order Status: Sent Specimen: Tissue from Scrotum Updated: 05/10/23 1641    Blood culture x two cultures. Draw prior to antibiotics. [[id number]] Collected: 05/10/23 1440    Order Status: Canceled Specimen: Blood from Arm, Right     Blood culture x two cultures. Draw prior to antibiotics. [[id number]]     Order Status: Canceled Specimen: Blood              Imaging      ASSESSMENT & PLAN  68 year old male post op day 2 s/p debridement of necrotic perineal and scrotal tissue reporting improving pain and no new complaints today  -will continue antibiotics and wait for urine cultures to return  -will continue serial wound checks to determine need for further debridement  -will change dressing as needed    Lloyd Everett  MS3    5/12/2023  5:43 AM

## 2023-05-12 NOTE — PLAN OF CARE
Problem: Diabetes Comorbidity  Goal: Blood Glucose Level Within Targeted Range  Outcome: Ongoing, Progressing     Problem: Infection  Goal: Absence of Infection Signs and Symptoms  Outcome: Ongoing, Progressing     Problem: Adult Inpatient Plan of Care  Goal: Plan of Care Review  Outcome: Ongoing, Progressing  Goal: Patient-Specific Goal (Individualized)  Outcome: Ongoing, Progressing  Goal: Absence of Hospital-Acquired Illness or Injury  Outcome: Ongoing, Progressing  Goal: Optimal Comfort and Wellbeing  Outcome: Ongoing, Progressing  Goal: Readiness for Transition of Care  Outcome: Ongoing, Progressing

## 2023-05-13 LAB
ALBUMIN SERPL-MCNC: 1.8 G/DL (ref 3.4–4.8)
ALBUMIN/GLOB SERPL: 0.4 RATIO (ref 1.1–2)
ALP SERPL-CCNC: 96 UNIT/L (ref 40–150)
ALT SERPL-CCNC: 26 UNIT/L (ref 0–55)
AST SERPL-CCNC: 28 UNIT/L (ref 5–34)
BACTERIA UR CULT: NO GROWTH
BILIRUBIN DIRECT+TOT PNL SERPL-MCNC: 0.6 MG/DL
BUN SERPL-MCNC: 15.1 MG/DL (ref 8.4–25.7)
CALCIUM SERPL-MCNC: 8.9 MG/DL (ref 8.8–10)
CHLORIDE SERPL-SCNC: 108 MMOL/L (ref 98–107)
CO2 SERPL-SCNC: 21 MMOL/L (ref 23–31)
CREAT SERPL-MCNC: 0.74 MG/DL (ref 0.73–1.18)
ERYTHROCYTE [DISTWIDTH] IN BLOOD BY AUTOMATED COUNT: 15.9 % (ref 11.5–17)
GFR SERPLBLD CREATININE-BSD FMLA CKD-EPI: >60 MLS/MIN/1.73/M2
GLOBULIN SER-MCNC: 5 GM/DL (ref 2.4–3.5)
GLUCOSE SERPL-MCNC: 220 MG/DL (ref 82–115)
HCT VFR BLD AUTO: 35 % (ref 42–52)
HGB BLD-MCNC: 11.8 G/DL (ref 14–18)
MAGNESIUM SERPL-MCNC: 2 MG/DL (ref 1.6–2.6)
MCH RBC QN AUTO: 26.3 PG (ref 27–31)
MCHC RBC AUTO-ENTMCNC: 33.7 G/DL (ref 33–36)
MCV RBC AUTO: 78.1 FL (ref 80–94)
NRBC BLD AUTO-RTO: 0 %
PHOSPHATE SERPL-MCNC: 1.9 MG/DL (ref 2.3–4.7)
PLATELET # BLD AUTO: 256 X10(3)/MCL (ref 130–400)
PMV BLD AUTO: 10.4 FL (ref 7.4–10.4)
POCT GLUCOSE: 236 MG/DL (ref 70–110)
POCT GLUCOSE: 281 MG/DL (ref 70–110)
POCT GLUCOSE: 298 MG/DL (ref 70–110)
POCT GLUCOSE: 301 MG/DL (ref 70–110)
POCT GLUCOSE: 341 MG/DL (ref 70–110)
POCT GLUCOSE: 342 MG/DL (ref 70–110)
POTASSIUM SERPL-SCNC: 3.9 MMOL/L (ref 3.5–5.1)
PROT SERPL-MCNC: 6.8 GM/DL (ref 5.8–7.6)
RBC # BLD AUTO: 4.48 X10(6)/MCL (ref 4.7–6.1)
SODIUM SERPL-SCNC: 137 MMOL/L (ref 136–145)
VANCOMYCIN TROUGH SERPL-MCNC: 9.3 UG/ML (ref 15–20)
WBC # SPEC AUTO: 12.84 X10(3)/MCL (ref 4.5–11.5)

## 2023-05-13 PROCEDURE — 25000003 PHARM REV CODE 250: Performed by: STUDENT IN AN ORGANIZED HEALTH CARE EDUCATION/TRAINING PROGRAM

## 2023-05-13 PROCEDURE — 63700000 PHARM REV CODE 250 ALT 637 W/O HCPCS: Performed by: STUDENT IN AN ORGANIZED HEALTH CARE EDUCATION/TRAINING PROGRAM

## 2023-05-13 PROCEDURE — 21400001 HC TELEMETRY ROOM

## 2023-05-13 PROCEDURE — 27000221 HC OXYGEN, UP TO 24 HOURS

## 2023-05-13 PROCEDURE — 84100 ASSAY OF PHOSPHORUS: CPT | Performed by: STUDENT IN AN ORGANIZED HEALTH CARE EDUCATION/TRAINING PROGRAM

## 2023-05-13 PROCEDURE — 63600175 PHARM REV CODE 636 W HCPCS: Performed by: STUDENT IN AN ORGANIZED HEALTH CARE EDUCATION/TRAINING PROGRAM

## 2023-05-13 PROCEDURE — 63600175 PHARM REV CODE 636 W HCPCS

## 2023-05-13 PROCEDURE — 80202 ASSAY OF VANCOMYCIN: CPT | Performed by: STUDENT IN AN ORGANIZED HEALTH CARE EDUCATION/TRAINING PROGRAM

## 2023-05-13 PROCEDURE — 83735 ASSAY OF MAGNESIUM: CPT | Performed by: STUDENT IN AN ORGANIZED HEALTH CARE EDUCATION/TRAINING PROGRAM

## 2023-05-13 PROCEDURE — 94761 N-INVAS EAR/PLS OXIMETRY MLT: CPT

## 2023-05-13 PROCEDURE — 80053 COMPREHEN METABOLIC PANEL: CPT | Performed by: STUDENT IN AN ORGANIZED HEALTH CARE EDUCATION/TRAINING PROGRAM

## 2023-05-13 PROCEDURE — 85027 COMPLETE CBC AUTOMATED: CPT | Performed by: STUDENT IN AN ORGANIZED HEALTH CARE EDUCATION/TRAINING PROGRAM

## 2023-05-13 RX ORDER — THIAMINE HCL 100 MG
100 TABLET ORAL DAILY
Status: DISCONTINUED | OUTPATIENT
Start: 2023-05-13 | End: 2023-05-16 | Stop reason: HOSPADM

## 2023-05-13 RX ORDER — CANAGLIFLOZIN 300 MG/1
300 TABLET, FILM COATED ORAL
Status: ON HOLD | COMMUNITY
Start: 2023-01-25 | End: 2023-05-16 | Stop reason: HOSPADM

## 2023-05-13 RX ORDER — LORAZEPAM 1 MG/1
1 TABLET ORAL EVERY 4 HOURS PRN
Status: DISCONTINUED | OUTPATIENT
Start: 2023-05-13 | End: 2023-05-16 | Stop reason: HOSPADM

## 2023-05-13 RX ORDER — FOLIC ACID 1 MG/1
1 TABLET ORAL DAILY
Status: DISCONTINUED | OUTPATIENT
Start: 2023-05-13 | End: 2023-05-16 | Stop reason: HOSPADM

## 2023-05-13 RX ORDER — ATORVASTATIN CALCIUM 40 MG/1
80 TABLET, FILM COATED ORAL NIGHTLY
Status: DISCONTINUED | OUTPATIENT
Start: 2023-05-13 | End: 2023-05-16 | Stop reason: HOSPADM

## 2023-05-13 RX ORDER — PIOGLITAZONEHYDROCHLORIDE 30 MG/1
30 TABLET ORAL
Status: ON HOLD | COMMUNITY
Start: 2023-01-25 | End: 2023-05-16 | Stop reason: HOSPADM

## 2023-05-13 RX ORDER — INSULIN ASPART 100 [IU]/ML
5 INJECTION, SOLUTION INTRAVENOUS; SUBCUTANEOUS
Status: DISCONTINUED | OUTPATIENT
Start: 2023-05-14 | End: 2023-05-14

## 2023-05-13 RX ORDER — DULAGLUTIDE 3 MG/.5ML
INJECTION, SOLUTION SUBCUTANEOUS
Status: ON HOLD | COMMUNITY
Start: 2023-03-20 | End: 2023-07-05 | Stop reason: HOSPADM

## 2023-05-13 RX ORDER — FLUCONAZOLE 100 MG/1
400 TABLET ORAL DAILY
Status: DISCONTINUED | OUTPATIENT
Start: 2023-05-13 | End: 2023-05-16 | Stop reason: HOSPADM

## 2023-05-13 RX ORDER — SULFAMETHOXAZOLE AND TRIMETHOPRIM 800; 160 MG/1; MG/1
1 TABLET ORAL 2 TIMES DAILY
Status: DISCONTINUED | OUTPATIENT
Start: 2023-05-13 | End: 2023-05-16 | Stop reason: HOSPADM

## 2023-05-13 RX ADMIN — MUPIROCIN: 20 OINTMENT TOPICAL at 08:05

## 2023-05-13 RX ADMIN — INSULIN ASPART 9 UNITS: 100 INJECTION, SOLUTION INTRAVENOUS; SUBCUTANEOUS at 12:05

## 2023-05-13 RX ADMIN — THIAMINE HCL TAB 100 MG 100 MG: 100 TAB at 01:05

## 2023-05-13 RX ADMIN — INSULIN DETEMIR 40 UNITS: 100 INJECTION, SOLUTION SUBCUTANEOUS at 08:05

## 2023-05-13 RX ADMIN — ATORVASTATIN CALCIUM 80 MG: 40 TABLET, FILM COATED ORAL at 08:05

## 2023-05-13 RX ADMIN — SULFAMETHOXAZOLE AND TRIMETHOPRIM 1 TABLET: 800; 160 TABLET ORAL at 08:05

## 2023-05-13 RX ADMIN — METOPROLOL TARTRATE 50 MG: 50 TABLET, FILM COATED ORAL at 08:05

## 2023-05-13 RX ADMIN — MUPIROCIN: 20 OINTMENT TOPICAL at 09:05

## 2023-05-13 RX ADMIN — HYDROMORPHONE HYDROCHLORIDE 1 MG: 1 INJECTION, SOLUTION INTRAMUSCULAR; INTRAVENOUS; SUBCUTANEOUS at 04:05

## 2023-05-13 RX ADMIN — ENOXAPARIN SODIUM 40 MG: 40 INJECTION SUBCUTANEOUS at 04:05

## 2023-05-13 RX ADMIN — HYDROMORPHONE HYDROCHLORIDE 1 MG: 1 INJECTION, SOLUTION INTRAMUSCULAR; INTRAVENOUS; SUBCUTANEOUS at 08:05

## 2023-05-13 RX ADMIN — INSULIN ASPART 8 UNITS: 100 INJECTION, SOLUTION INTRAVENOUS; SUBCUTANEOUS at 12:05

## 2023-05-13 RX ADMIN — CEFEPIME 2 G: 2 INJECTION, POWDER, FOR SOLUTION INTRAVENOUS at 03:05

## 2023-05-13 RX ADMIN — INSULIN ASPART 12 UNITS: 100 INJECTION, SOLUTION INTRAVENOUS; SUBCUTANEOUS at 04:05

## 2023-05-13 RX ADMIN — Medication 5 ML: at 01:05

## 2023-05-13 RX ADMIN — OXYCODONE HYDROCHLORIDE 5 MG: 5 TABLET ORAL at 08:05

## 2023-05-13 RX ADMIN — FLUCONAZOLE 400 MG: 100 TABLET ORAL at 09:05

## 2023-05-13 RX ADMIN — INSULIN DETEMIR 10 UNITS: 100 INJECTION, SOLUTION SUBCUTANEOUS at 12:05

## 2023-05-13 RX ADMIN — OXYCODONE HYDROCHLORIDE 5 MG: 5 TABLET ORAL at 06:05

## 2023-05-13 RX ADMIN — INSULIN ASPART 9 UNITS: 100 INJECTION, SOLUTION INTRAVENOUS; SUBCUTANEOUS at 06:05

## 2023-05-13 RX ADMIN — CLINDAMYCIN IN 5 PERCENT DEXTROSE 900 MG: 18 INJECTION, SOLUTION INTRAVENOUS at 12:05

## 2023-05-13 RX ADMIN — FOLIC ACID 1 MG: 1 TABLET ORAL at 01:05

## 2023-05-13 NOTE — PROGRESS NOTES
General Surgery  Daily Progress Note     HD#3  POD#3 Days Post-Op    SUBJECTIVE / INTERVAL EVENTS  NAEON AF VSS  Pain well controlled  Tolerating diabetic double protein diet with no nausea or vomiting  Passing gas, last BM yesterday   Dressing changes yesterday at bedside, wound appeared healthy with no necrotic tissue or purulent drainage.   Adequate UOP with stubbs in place     OBJECTIVE    Vitals  Temp:  [98.2 °F (36.8 °C)-99.9 °F (37.7 °C)] 98.4 °F (36.9 °C)  Pulse:  [] 95  Resp:  [16-18] 18  SpO2:  [91 %-100 %] 96 %  BP: (126-159)/(69-80) 126/75    Intake / Output  UO: 1600 cc via stubbs cath  Last Bowel Movement: 05/12/23      Physical Exam:  GEN: comfortable, no acute distress  RESP: breathing comfortably on room air  ABD: soft, nondistended, nontender  : Stubbs in place. Dressing in place around scrotum.        Labs    Lab Results   Component Value Date    WBC 12.84 (H) 05/13/2023    HGB 11.8 (L) 05/13/2023    HCT 35.0 (L) 05/13/2023    MCV 78.1 (L) 05/13/2023     05/13/2023           Recent Labs     05/10/23  2005 05/11/23  0327 05/12/23  0320 05/13/23  0505    138 136 137   K 3.8 4.0 4.1 3.9   CO2 22* 19* 21* 21*   BUN 31.2* 29.8* 20.0 15.1   CREATININE 1.20* 1.09 0.96 0.74   CALCIUM 9.0 8.9 8.9 8.9   MG  --  2.00 2.00 2.00   PHOS  --  3.5 2.1* 1.9*   ALBUMIN 1.9* 1.9* 1.8* 1.8*   BILITOT 0.8 1.1 0.6 0.6   AST 23 24 27 28   ALKPHOS 86 89 123 96   ALT 21 21 25 26          Micro  Microbiology Results (last 7 days)       Procedure Component Value Units Date/Time    Anaerobic Culture [[id number]] Collected: 05/10/23 1609    Order Status: Completed Specimen: Tissue from Scrotum Updated: 05/13/23 0817     Anaerobe Culture No Anaerobes Isolated    Tissue Culture - Aerobic [[id number]]  (Abnormal) Collected: 05/10/23 1609    Order Status: Completed Specimen: Tissue from Scrotum Updated: 05/13/23 0749     CULTURE, TISSUE- AEROBIC ([facility]) Few GAMMA STREPTOCOCCUS    Blood culture #1 **CANNOT BE ORDERED  STAT** [[id number]]  (Normal) Collected: 05/10/23 1448    Order Status: Completed Specimen: Blood from Arm, Right Updated: 05/12/23 2000     CULTURE, BLOOD ([facility]) No Growth At 48 Hours    Blood culture #2 **CANNOT BE ORDERED STAT** [[id number]]  (Normal) Collected: 05/10/23 1448    Order Status: Completed Specimen: Blood from Hand, Right Updated: 05/12/23 2000     CULTURE, BLOOD ([facility]) No Growth At 48 Hours    Urine culture [[id number]] Collected: 05/10/23 2342    Order Status: Completed Specimen: Urine Updated: 05/12/23 0714     Urine Culture No Growth At 24 Hours    Gram Stain [[id number]] Collected: 05/10/23 1609    Order Status: Completed Specimen: Tissue from Scrotum Updated: 05/11/23 1000     GRAM STAIN Few WBC observed      Many Gram Positive Rods      Many Gram Negative Rods      Few Gram positive cocci    Blood culture x two cultures. Draw prior to antibiotics. [[id number]] Collected: 05/10/23 1440    Order Status: Canceled Specimen: Blood from Arm, Right     Blood culture x two cultures. Draw prior to antibiotics. 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 Wound with no further necrotic tissues on dressing change yesterday.      -Will hold off on further surgical debridement given appearance of wound yesterday  -Continue IV abx, antifungals  -Consider transitioning IV abx to oral Bactrim and diflucan   -Dressing change at bedside today   -Wound vac Monday vs Wednesday   -Diabetic diet  -Hospital medicine consulted for patient's poorly controlled diabetes  -Continue high dose SSI  -Continue stubbs  -Pulmonary toilet  -PT/OT  -Lovenox for DVT ppx    Dispo: CM following for LTAC dispo     Travis Delgadillo  [employer], MS3  05/13/2023    PGY3 Attestation   I have seen and examined  the patient with the medical student above. I agree with the above documentation and the note was edited as necessary.  Sarah Rosas MD   [employer] General Surgery PGY 3

## 2023-05-13 NOTE — MEDICAL/APP STUDENT
General Surgery  Daily Progress Note     HD#3  POD#3 Days Post-Op    SUBJECTIVE / INTERVAL EVENTS  NAEON AF VSS  Pain well controlled  Tolerating diabetic double protein diet with no nausea or vomiting  Passing gas, last BM 5/12/23  Dressing changes yesterday at bedside, wound appeared healthy with no necrotic tissue or purulent drainage.     OBJECTIVE    Vitals  Temp:  [98.2 °F (36.8 °C)-99.9 °F (37.7 °C)] 98.2 °F (36.8 °C)  Pulse:  [] 94  Resp:  [16-18] 18  SpO2:  [91 %-100 %] 100 %  BP: (127-159)/(69-80) 127/74    Intake / Output  UO: 1600 cc via stubbs cath  Last Bowel Movement: 05/12/23      Physical Exam:  GEN: comfortable, no acute distress  RESP: breathing comfortably on room air  ABD: soft, nondistended, nontender  : Stubbs in place. Dressing in place around scrotum.        Labs    Lab Results   Component Value Date    WBC 12.84 (H) 05/13/2023    HGB 11.8 (L) 05/13/2023    HCT 35.0 (L) 05/13/2023    MCV 78.1 (L) 05/13/2023     05/13/2023           Recent Labs     05/10/23  2005 05/11/23  0327 05/12/23  0320 05/13/23  0505    138 136 137   K 3.8 4.0 4.1 3.9   CO2 22* 19* 21* 21*   BUN 31.2* 29.8* 20.0 15.1   CREATININE 1.20* 1.09 0.96 0.74   CALCIUM 9.0 8.9 8.9 8.9   MG  --  2.00 2.00 2.00   PHOS  --  3.5 2.1* 1.9*   ALBUMIN 1.9* 1.9* 1.8* 1.8*   BILITOT 0.8 1.1 0.6 0.6   AST 23 24 27 28   ALKPHOS 86 89 123 96   ALT 21 21 25 26        Micro  Microbiology Results (last 7 days)       Procedure Component Value Units Date/Time    Blood culture #1 **CANNOT BE ORDERED STAT** [[id number]]  (Normal) Collected: 05/10/23 9958    Order Status: Completed Specimen: Blood from Arm, Right Updated: 05/12/23 2000     CULTURE, BLOOD ([facility]) No Growth At 48 Hours    Blood culture #2 **CANNOT BE ORDERED STAT** [[id number]]  (Normal) Collected: 05/10/23 1448    Order Status: Completed Specimen: Blood from Hand, Right Updated: 05/12/23 2000     CULTURE, BLOOD ([facility]) No Growth At 48 Hours    Anaerobic Culture  [[id number]] Collected: 05/10/23 1609    Order Status: Completed Specimen: Tissue from Scrotum Updated: 05/12/23 0737     Anaerobe Culture No Anaerobes Isolated    Urine culture [[id number]] Collected: 05/10/23 2342    Order Status: Completed Specimen: Urine Updated: 05/12/23 0714     Urine Culture No Growth At 24 Hours    Gram Stain [[id number]] Collected: 05/10/23 1609    Order Status: Completed Specimen: Tissue from Scrotum Updated: 05/11/23 1000     GRAM STAIN Few WBC observed      Many Gram Positive Rods      Many Gram Negative Rods      Few Gram positive cocci    Tissue Culture - Aerobic [[id number]] Collected: 05/10/23 1609    Order Status: Completed Specimen: Tissue from Scrotum Updated: 05/11/23 0631     CULTURE, TISSUE- AEROBIC ([facility]) No Growth At 24 Hours    Blood culture x two cultures. Draw prior to antibiotics. [[id number]] Collected: 05/10/23 1440    Order Status: Canceled Specimen: Blood from Arm, Right     Blood culture x two cultures. Draw prior to antibiotics. 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 Wound with no further necrotic tissues on dressing change yesterday.      -Will hold off on further surgical debridement given appearance of wound yesterday  -Continue IV abx, antifungals  -Consider transitioning IV abx to oral Bactrim   -Dressing change at bedside today   -Wound vac Monday vs Wednesday   -Diabetic diet  -Hospital medicine consulted for patient's poorly controlled diabetes  -Continue high dose SSI  -Continue stubbs  -Consider Iron studies for microcytic anemia  -Pulmonary toilet  -PT/OT  -Lovenox for DVT ppx    Dispo: Will plan to DC to [facility]     Travis Delgadillo  [facility] [employer], MS3  05/13/2023

## 2023-05-13 NOTE — PLAN OF CARE
05/13/23 1027   Discharge Reassessment   Assessment Type Discharge Planning Reassessment   Did the patient's condition or plan change since previous assessment? Yes   Discharge Plan discussed with: Adult children   Discharge Plan A Long-term acute care facility (LTAC)   Post-Acute Status   Post-Acute Authorization Placement   Post-Acute Placement Status Referrals Sent     Progress notes reflect need for LTAC placement. Discussed with [email or web address], Chen Dill, who requested [facility]. Referral submitted via [facility].

## 2023-05-13 NOTE — CONSULTS
"[facility]     Resident Team: [facility] List  Attending Physician: Dr. Crow Acosta/Dr. Wyman   Resident: Chapin Ortiz   Intern:  Yoanna Smith      Date of Admit: 5/13/2023   Chief Complaint:      Chief Complaint   Patient presents with    Testicle Pain     Patient in via ems after home health notified them of "swollen and possible ruptured testicles". Home health notes open wound and "oozing". HR 130s. /62. Patient non compliant with medications.  per ems.       Subjective:     History of Present Illness:  John Addison is a 68 y.o. 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 Wound now open, undergoing dressing changes per Gen Surgery and Wound Care.  Internal medicine team consulted for glucose control in setting of acute infectious process and  optimization prior to discharge. Discharge date TBD, planning for placement. No surgical intervention planned w/in next 24 Hour. T  is clinically stable post-operatively.     Pt reports he takes Metformin 1000 mg BID, Glipizide 10 mg BID, Invokana 300 mg qd, Trulicity 3 mg q week and Actos 30 mg qd at home for his DM-II. He is prescribed Tresiba 55 U qd, but pt says he doesn't take regularly (max 2-3 x  per week). Pt reports that number of medications and ETOH use contribute to him not taking this long acting insulin. He has home health services who help with his oral medications, and he denies regularly missing doses. He reports having done DM-II education in the past. He was seen by [facility] in 9/2022 and has an upcoming appt in 5/20/2023.  He checks CBGs, irregularly at home, reports that randomly typically in 180s-250 range. Has high carb diet at baseline, but notices w/ increased sweets he has increase and attempts to cut back. + " tobacco and ETOH use (at least 12 beers q daily) Pt denies any sub f/c, CP, SOB, light-headedness/dizziness, HA, visual changes, abdominal pain, n/v, increased fatigue, polyuria, polydipsia. Denies any agitation, tremors, hallucinations, confusion, memory issues.     Review of Systems:  As noted in HPI.     Past Medical History:   ----------------------------  Coronary artery disease  Diabetes mellitus  Hypertension    Past Surgical History:  Past Surgical History:   Procedure Laterality Date    CORONARY ANGIOPLASTY      TONSILLECTOMY  07/2018    WOUND DEBRIDEMENT N/A 5/10/2023    Procedure: DEBRIDEMENT, WOUND;  Surgeon: Smith Lay MD;  Location: [facility];  Service: General;  Laterality: N/A;  perineum debridement, lithotomy       Allergies:  Review of patient's allergies indicates:  No Known Allergies    Home Medications:  No current facility-administered medications on file prior to encounter.     Current Outpatient Medications on File Prior to Encounter   Medication Sig Dispense Refill    albuterol (PROVENTIL/VENTOLIN HFA) 90 mcg/actuation inhaler Inhale 2 puffs into the lungs as needed for Wheezing or Shortness of Breath. 18 g 11    ammonium lactate (LAC-HYDRIN) 12 % lotion Apply topically as needed for Dry Skin. Apply to feet exception between toes, thin then of Vaseline. 225 g 2    aspirin 81 MG Chew Take 81 mg by mouth once daily.      atorvastatin (LIPITOR) 80 MG tablet Take 1 tablet (80 mg total) by mouth once daily. 90 tablet 3    clopidogreL (PLAVIX) 75 mg tablet Take 1 tablet (75 mg total) by mouth once daily. 90 tablet 3    cromolyn (OPTICROM) 4 % ophthalmic solution 1 drop.      EASY TOUCH TWIST LANCETS 33 gauge Misc USE ONE TWICE DAILY      famotidine (PEPCID) 40 MG tablet Take 1 tablet (40 mg total) by mouth 2 (two) times daily. 180 tablet 3    fluticasone propionate (FLONASE) 50 mcg/actuation nasal spray 1 spray by Each Nostril route as needed.      fluticasone-salmeterol 230-21 mcg/dose  "(ADVAIR HFA) 230-21 mcg/actuation HFAA inhaler Inhale 1 puff into the lungs 2 (two) times a day. Controller 12 g 11    glipiZIDE (GLUCOTROL) 10 MG tablet Take 10 mg by mouth 2 (two) times daily.      INVOKANA 300 mg Tab tablet Take 300 mg by mouth.      lisinopriL 10 MG tablet Take 1 tablet (10 mg total) by mouth once daily. 90 tablet 3    metFORMIN (GLUCOPHAGE) 1000 MG tablet Take 1 tablet (1,000 mg total) by mouth 2 (two) times daily with meals. 60 tablet 1    metoprolol tartrate (LOPRESSOR) 50 MG tablet Take 1 tablet (50 mg total) by mouth 2 (two) times daily. 180 tablet 3    omeprazole (PRILOSEC) 40 MG capsule Take 1 capsule (40 mg total) by mouth once daily. 30 capsule 0    pioglitazone (ACTOS) 30 MG tablet Take 30 mg by mouth.      TRUE METRIX GLUCOSE METER Misc USE ONE TWICE DAILY      TRULICITY 3 mg/0.5 mL pen injector SMARTSIG:3 Milligram(s) SUB-Q Once a Week           Family History:  Family History   Problem Relation Age of Onset    Hypertension Mother     Heart failure Mother     Heart attack Mother     Coronary artery disease Mother     Cancer Father     Cancer Sister        Social History:  Social History     Tobacco Use    Smoking status: Every Day     Packs/day: 0.50     Types: Cigarettes    Smokeless tobacco: Never   Substance Use Topics    Alcohol use: Yes     Comment: beer 2x/month    Drug use: Yes     Types: Marijuana          Objective:   Vitals  Vitals  BP: 126/75  Temp: 98.4 °F (36.9 °C)  Temp Source: Oral  Pulse: 95  Resp: 16  SpO2: 96 %  Height: 5' 10" (177.8 cm)  Weight: 117.9 kg (260 lb)    Physical Examination:  General: well-developed well-nourished in no acute distress, obese   Eye: PERRLA, EOMI, clear conjunctiva, eyelids normal  HENT: oropharynx without erythema/exudate, oropharynx and nasal mucosal surfaces moist  Neck: full range of motion, no thyromegaly or lymphadenopathy  Respiratory: clear to auscultation bilaterally  Cardiovascular: regular rate and rhythm without murmurs, " gallops or rubs  Gastrointestinal: soft, non-tender, non-distended with normal bowel sounds, without masses to palpation  Genitourinary: R groin wound w/ dressing, minimal shadowing.   Musculoskeletal: full range of motion of all extremities/spine without limitation or discomfort  Integumentary: no rashes or skin lesions present  Neurologic: cranial nerves intact, no signs of peripheral neurological deficit, motor/sensory function intact    Laboratory:  CMP:  Recent Labs   Lab 05/11/23  0327 05/12/23  0320 05/13/23  0505    136 137   K 4.0 4.1 3.9   CHLORIDE 107 108* 108*   CO2 19* 21* 21*   BUN 29.8* 20.0 15.1   CREATININE 1.09 0.96 0.74   GLUCOSE 164* 196* 220*   CALCIUM 8.9 8.9 8.9   ALBUMIN 1.9* 1.8* 1.8*   BILITOT 1.1 0.6 0.6   AST 24 27 28   ALT 21 25 26   ALKPHOS 89 123 96     CBC:  Recent Labs   Lab 05/10/23  1440 05/10/23  2005 05/11/23  0702 05/12/23  0350 05/13/23  0505   WBC 24.76*   < > 24.45* 20.35* 12.84*   ABSNEUTRO 20.75*  --   --  15.94*  --    RBC 5.26   < > 4.31* 4.40* 4.48*   HGB 14.1   < > 11.9* 11.5* 11.8*   HCT 41.5*   < > 33.5* 35.2* 35.0*      < > 250 250 256   MCV 78.9*   < > 77.7* 80.0 78.1*   RDW 16.0   < > 16.0 16.1 15.9    < > = values in this interval not displayed.       Electrolytes:  Recent Labs   Lab 05/11/23  0327 05/12/23  0320 05/13/23  0505    136 137   K 4.0 4.1 3.9   CHLORIDE 107 108* 108*   CALCIUM 8.9 8.9 8.9   MG 2.00 2.00 2.00   PHOS 3.5 2.1* 1.9*       24hr CBG:  Recent Labs   Lab 05/12/23  1124 05/12/23  1603 05/13/23  0028 05/13/23  0646 05/13/23  0801   POCTGLUCOSE 288* 267* 342* 281* 236*         Radiology:  No results found in the last 24 hours.     Assessment & Plan:   DM-II  Yesi's Gangrene   HTN   CAD  HLD   Tobacco Use   ETOH Abuse   -Low concern for DKA or HHS, not AGMA, won't order further studies at this time.     -Pt on High Dose SSI since inpt. Pt received approx 41 U of insulin in last 24 hours w/ CBGs in 200s-342.     -Will give 10  U Detemir now, and then give 30 U qHS. Will up titrate qHS dose pending response/insulin req throughout day. Continue High Dose SSI.    -Hold all other home DM-II meds. Pt shouldn't take Actos, discontinued at last Endocrine visit. Pt shouldn't take Invokana or any other SGLT2  given hx of severe  infection, discontinue on discharge.     -qACHS CBGs or q6 hours if NPO.    -Contact if pt will be NPO for surgical intervention, as will need down titration of Insulin.     -Pt with upcoming Endocrine appt, if discharged in time and able to transport from facility. May be helpful for additional oupt control. Pt most recent A1c which is improved from previous of approx 12. However, pt with inconsistent use of Insulin and poor diet/lifestyle, which likely hampers  consistent control.     -Added home Statin, 80 mg qHS.     -Placed on CIWA protocol w/ Thiamine, MVI, and Folic Acid and q4 Ativan 1 mg  for CIWA > 8 or sig signs of withdrawal.     -Rest of care and medication management per primary team.     CODE STATUS: Full Code  Access: PIV   Antimicrobials: Fluconazole 400 mg qd, Bactrim 1 tab BID  Diet: Diabetic Diet   DVT Prophylaxis: Lovenox 40 mg qd   GI Prophylaxis: None   Fluids: PO intake     Disposition: Will up titrate Insulin and give home med recs prior to discharge. Rest of care per primary team.       A total of 30 mins were spent on this encounter with this patient, which include interviewing patient and/or family, chart reviewing, coordinating care, examining patient, and documenting.    Cindi Ortiz MD

## 2023-05-14 LAB
ABO + RH BLD: NORMAL
ALBUMIN SERPL-MCNC: 1.8 G/DL (ref 3.4–4.8)
ALBUMIN/GLOB SERPL: 0.4 RATIO (ref 1.1–2)
ALP SERPL-CCNC: 98 UNIT/L (ref 40–150)
ALT SERPL-CCNC: 28 UNIT/L (ref 0–55)
AST SERPL-CCNC: 25 UNIT/L (ref 5–34)
BACTERIA SPEC CULT: ABNORMAL
BILIRUBIN DIRECT+TOT PNL SERPL-MCNC: 0.3 MG/DL
BLD PROD TYP BPU: NORMAL
BLOOD UNIT EXPIRATION DATE: NORMAL
BLOOD UNIT TYPE CODE: 6200
BUN SERPL-MCNC: 17.4 MG/DL (ref 8.4–25.7)
CALCIUM SERPL-MCNC: 9.4 MG/DL (ref 8.8–10)
CHLORIDE SERPL-SCNC: 108 MMOL/L (ref 98–107)
CO2 SERPL-SCNC: 22 MMOL/L (ref 23–31)
CREAT SERPL-MCNC: 0.84 MG/DL (ref 0.73–1.18)
CROSSMATCH INTERPRETATION: NORMAL
DISPENSE STATUS: NORMAL
ERYTHROCYTE [DISTWIDTH] IN BLOOD BY AUTOMATED COUNT: 16.3 % (ref 11.5–17)
GFR SERPLBLD CREATININE-BSD FMLA CKD-EPI: >60 MLS/MIN/1.73/M2
GLOBULIN SER-MCNC: 5.1 GM/DL (ref 2.4–3.5)
GLUCOSE SERPL-MCNC: 253 MG/DL (ref 82–115)
HCT VFR BLD AUTO: 36.1 % (ref 42–52)
HGB BLD-MCNC: 12 G/DL (ref 14–18)
MAGNESIUM SERPL-MCNC: 1.9 MG/DL (ref 1.6–2.6)
MCH RBC QN AUTO: 26.1 PG (ref 27–31)
MCHC RBC AUTO-ENTMCNC: 33.2 G/DL (ref 33–36)
MCV RBC AUTO: 78.6 FL (ref 80–94)
NRBC BLD AUTO-RTO: 0 %
PHOSPHATE SERPL-MCNC: 2.1 MG/DL (ref 2.3–4.7)
PLATELET # BLD AUTO: 315 X10(3)/MCL (ref 130–400)
PMV BLD AUTO: 10.9 FL (ref 7.4–10.4)
POCT GLUCOSE: 182 MG/DL (ref 70–110)
POCT GLUCOSE: 236 MG/DL (ref 70–110)
POCT GLUCOSE: 333 MG/DL (ref 70–110)
POCT GLUCOSE: 344 MG/DL (ref 70–110)
POCT GLUCOSE: 366 MG/DL (ref 70–110)
POTASSIUM SERPL-SCNC: 4.4 MMOL/L (ref 3.5–5.1)
PROT SERPL-MCNC: 6.9 GM/DL (ref 5.8–7.6)
RBC # BLD AUTO: 4.59 X10(6)/MCL (ref 4.7–6.1)
SODIUM SERPL-SCNC: 138 MMOL/L (ref 136–145)
UNIT NUMBER: NORMAL
WBC # SPEC AUTO: 12.5 X10(3)/MCL (ref 4.5–11.5)

## 2023-05-14 PROCEDURE — 25000242 PHARM REV CODE 250 ALT 637 W/ HCPCS: Performed by: STUDENT IN AN ORGANIZED HEALTH CARE EDUCATION/TRAINING PROGRAM

## 2023-05-14 PROCEDURE — 63600175 PHARM REV CODE 636 W HCPCS: Performed by: STUDENT IN AN ORGANIZED HEALTH CARE EDUCATION/TRAINING PROGRAM

## 2023-05-14 PROCEDURE — 94640 AIRWAY INHALATION TREATMENT: CPT

## 2023-05-14 PROCEDURE — 85027 COMPLETE CBC AUTOMATED: CPT | Performed by: STUDENT IN AN ORGANIZED HEALTH CARE EDUCATION/TRAINING PROGRAM

## 2023-05-14 PROCEDURE — 25000003 PHARM REV CODE 250: Performed by: STUDENT IN AN ORGANIZED HEALTH CARE EDUCATION/TRAINING PROGRAM

## 2023-05-14 PROCEDURE — 80053 COMPREHEN METABOLIC PANEL: CPT | Performed by: STUDENT IN AN ORGANIZED HEALTH CARE EDUCATION/TRAINING PROGRAM

## 2023-05-14 PROCEDURE — 99900035 HC TECH TIME PER 15 MIN (STAT)

## 2023-05-14 PROCEDURE — 94761 N-INVAS EAR/PLS OXIMETRY MLT: CPT

## 2023-05-14 PROCEDURE — 63700000 PHARM REV CODE 250 ALT 637 W/O HCPCS: Performed by: STUDENT IN AN ORGANIZED HEALTH CARE EDUCATION/TRAINING PROGRAM

## 2023-05-14 PROCEDURE — 63600175 PHARM REV CODE 636 W HCPCS

## 2023-05-14 PROCEDURE — 21400001 HC TELEMETRY ROOM

## 2023-05-14 PROCEDURE — 83735 ASSAY OF MAGNESIUM: CPT | Performed by: STUDENT IN AN ORGANIZED HEALTH CARE EDUCATION/TRAINING PROGRAM

## 2023-05-14 PROCEDURE — 84100 ASSAY OF PHOSPHORUS: CPT | Performed by: STUDENT IN AN ORGANIZED HEALTH CARE EDUCATION/TRAINING PROGRAM

## 2023-05-14 RX ORDER — CLOPIDOGREL BISULFATE 75 MG/1
75 TABLET ORAL DAILY
Status: DISCONTINUED | OUTPATIENT
Start: 2023-05-14 | End: 2023-05-16 | Stop reason: HOSPADM

## 2023-05-14 RX ORDER — INSULIN ASPART 100 [IU]/ML
8 INJECTION, SOLUTION INTRAVENOUS; SUBCUTANEOUS
Status: DISCONTINUED | OUTPATIENT
Start: 2023-05-14 | End: 2023-05-15

## 2023-05-14 RX ORDER — NAPROXEN SODIUM 220 MG/1
81 TABLET, FILM COATED ORAL DAILY
Status: DISCONTINUED | OUTPATIENT
Start: 2023-05-14 | End: 2023-05-16 | Stop reason: HOSPADM

## 2023-05-14 RX ORDER — TAMSULOSIN HYDROCHLORIDE 0.4 MG/1
0.4 CAPSULE ORAL 2 TIMES DAILY
Status: DISCONTINUED | OUTPATIENT
Start: 2023-05-14 | End: 2023-05-16 | Stop reason: HOSPADM

## 2023-05-14 RX ORDER — ATORVASTATIN CALCIUM 40 MG/1
80 TABLET, FILM COATED ORAL DAILY
Status: DISCONTINUED | OUTPATIENT
Start: 2023-05-14 | End: 2023-05-14

## 2023-05-14 RX ORDER — FAMOTIDINE 20 MG/1
40 TABLET, FILM COATED ORAL 2 TIMES DAILY
Status: DISCONTINUED | OUTPATIENT
Start: 2023-05-14 | End: 2023-05-15

## 2023-05-14 RX ORDER — ALBUTEROL SULFATE 90 UG/1
2 AEROSOL, METERED RESPIRATORY (INHALATION)
Status: DISCONTINUED | OUTPATIENT
Start: 2023-05-14 | End: 2023-05-16 | Stop reason: HOSPADM

## 2023-05-14 RX ORDER — FLUTICASONE FUROATE AND VILANTEROL 200; 25 UG/1; UG/1
1 POWDER RESPIRATORY (INHALATION) DAILY
Status: DISCONTINUED | OUTPATIENT
Start: 2023-05-14 | End: 2023-05-16 | Stop reason: HOSPADM

## 2023-05-14 RX ORDER — LISINOPRIL 10 MG/1
10 TABLET ORAL DAILY
Status: DISCONTINUED | OUTPATIENT
Start: 2023-05-14 | End: 2023-05-16 | Stop reason: HOSPADM

## 2023-05-14 RX ADMIN — INSULIN ASPART 5 UNITS: 100 INJECTION, SOLUTION INTRAVENOUS; SUBCUTANEOUS at 11:05

## 2023-05-14 RX ADMIN — ENOXAPARIN SODIUM 40 MG: 40 INJECTION SUBCUTANEOUS at 04:05

## 2023-05-14 RX ADMIN — TAMSULOSIN HYDROCHLORIDE 0.4 MG: 0.4 CAPSULE ORAL at 09:05

## 2023-05-14 RX ADMIN — INSULIN ASPART 4 UNITS: 100 INJECTION, SOLUTION INTRAVENOUS; SUBCUTANEOUS at 08:05

## 2023-05-14 RX ADMIN — SULFAMETHOXAZOLE AND TRIMETHOPRIM 1 TABLET: 800; 160 TABLET ORAL at 08:05

## 2023-05-14 RX ADMIN — ATORVASTATIN CALCIUM 80 MG: 40 TABLET, FILM COATED ORAL at 08:05

## 2023-05-14 RX ADMIN — LISINOPRIL 10 MG: 10 TABLET ORAL at 09:05

## 2023-05-14 RX ADMIN — Medication 5 ML: at 08:05

## 2023-05-14 RX ADMIN — OXYCODONE HYDROCHLORIDE 5 MG: 5 TABLET ORAL at 08:05

## 2023-05-14 RX ADMIN — ASPIRIN 81 MG: 81 TABLET, CHEWABLE ORAL at 09:05

## 2023-05-14 RX ADMIN — METOPROLOL TARTRATE 50 MG: 50 TABLET, FILM COATED ORAL at 08:05

## 2023-05-14 RX ADMIN — HYDROMORPHONE HYDROCHLORIDE 1 MG: 1 INJECTION, SOLUTION INTRAMUSCULAR; INTRAVENOUS; SUBCUTANEOUS at 08:05

## 2023-05-14 RX ADMIN — ATORVASTATIN CALCIUM 80 MG: 40 TABLET, FILM COATED ORAL at 09:05

## 2023-05-14 RX ADMIN — THIAMINE HCL TAB 100 MG 100 MG: 100 TAB at 08:05

## 2023-05-14 RX ADMIN — INSULIN ASPART 12 UNITS: 100 INJECTION, SOLUTION INTRAVENOUS; SUBCUTANEOUS at 11:05

## 2023-05-14 RX ADMIN — MUPIROCIN: 20 OINTMENT TOPICAL at 08:05

## 2023-05-14 RX ADMIN — FLUTICASONE FUROATE AND VILANTEROL TRIFENATATE 1 PUFF: 200; 25 POWDER RESPIRATORY (INHALATION) at 09:05

## 2023-05-14 RX ADMIN — INSULIN ASPART 8 UNITS: 100 INJECTION, SOLUTION INTRAVENOUS; SUBCUTANEOUS at 12:05

## 2023-05-14 RX ADMIN — FOLIC ACID 1 MG: 1 TABLET ORAL at 08:05

## 2023-05-14 RX ADMIN — TAMSULOSIN HYDROCHLORIDE 0.4 MG: 0.4 CAPSULE ORAL at 08:05

## 2023-05-14 RX ADMIN — HYDROMORPHONE HYDROCHLORIDE 1 MG: 1 INJECTION, SOLUTION INTRAMUSCULAR; INTRAVENOUS; SUBCUTANEOUS at 12:05

## 2023-05-14 RX ADMIN — INSULIN DETEMIR 60 UNITS: 100 INJECTION, SOLUTION SUBCUTANEOUS at 08:05

## 2023-05-14 RX ADMIN — CLOPIDOGREL BISULFATE 75 MG: 75 TABLET ORAL at 09:05

## 2023-05-14 RX ADMIN — INSULIN ASPART 8 UNITS: 100 INJECTION, SOLUTION INTRAVENOUS; SUBCUTANEOUS at 04:05

## 2023-05-14 RX ADMIN — FAMOTIDINE 40 MG: 20 TABLET ORAL at 08:05

## 2023-05-14 RX ADMIN — FAMOTIDINE 40 MG: 20 TABLET ORAL at 09:05

## 2023-05-14 RX ADMIN — INSULIN ASPART 5 UNITS: 100 INJECTION, SOLUTION INTRAVENOUS; SUBCUTANEOUS at 08:05

## 2023-05-14 RX ADMIN — FLUCONAZOLE 400 MG: 100 TABLET ORAL at 08:05

## 2023-05-14 RX ADMIN — INSULIN ASPART 15 UNITS: 100 INJECTION, SOLUTION INTRAVENOUS; SUBCUTANEOUS at 04:05

## 2023-05-14 RX ADMIN — MUPIROCIN: 20 OINTMENT TOPICAL at 09:05

## 2023-05-14 NOTE — PROGRESS NOTES
[facility] General Surgery  Daily Progress Note     HD#4  POD#4 Days Post-Op    SUBJECTIVE / INTERVAL EVENTS  NAEON AF VSS  Pain well controlled  Tolerating diabetic double protein diet with no nausea or vomiting  Passing gas, last BM yesterday  Dressing changes yesterday at bedside with no necrotic changes or purulent drainage.  Pt tolerated the dressing change well.   Medicine on board, optimizing insulin regimen    OBJECTIVE    Vitals  Temp:  [97.9 °F (36.6 °C)-98.5 °F (36.9 °C)] 98.3 °F (36.8 °C)  Pulse:  [78-91] 89  Resp:  [16-18] 18  SpO2:  [95 %-97 %] 95 %  BP: (120-153)/(75-88) 137/80    Intake / Output  UO: 2150 cc  Last Bowel Movement: 05/14/23    Physical Exam:  GEN: comfortable, no acute distress  RESP: breathing comfortably on room air  ABD: soft, nondistended, nontender  : Dressing in place on scrotum, no strikethrough on dressings     Labs    Lab Results   Component Value Date    WBC 12.50 (H) 05/14/2023    HGB 12.0 (L) 05/14/2023    HCT 36.1 (L) 05/14/2023    MCV 78.6 (L) 05/14/2023     05/14/2023     Recent Labs     05/12/23  0320 05/13/23  0505 05/14/23  0349    137 138   K 4.1 3.9 4.4   CO2 21* 21* 22*   BUN 20.0 15.1 17.4   CREATININE 0.96 0.74 0.84   CALCIUM 8.9 8.9 9.4   MG 2.00 2.00 1.90   PHOS 2.1* 1.9* 2.1*   ALBUMIN 1.8* 1.8* 1.8*   BILITOT 0.6 0.6 0.3   AST 27 28 25   ALKPHOS 123 96 98   ALT 25 26 28        Micro  Microbiology Results (last 7 days)       Procedure Component Value Units Date/Time    Blood culture #1 **CANNOT BE ORDERED STAT** [[id number]]  (Normal) Collected: 05/10/23 1175    Order Status: Completed Specimen: Blood from Arm, Right Updated: 05/13/23 2000     CULTURE, BLOOD ([facility]) No Growth At 72 Hours    Blood culture #2 **CANNOT BE ORDERED STAT** [[id number]]  (Normal) Collected: 05/10/23 1448    Order Status: Completed Specimen: Blood from Hand, Right Updated: 05/13/23 2000     CULTURE, BLOOD ([facility]) No Growth At 72 Hours    Urine culture [[id number]] Collected: 05/10/23  2342    Order Status: Completed Specimen: Urine Updated: 05/13/23 0923     Urine Culture No Growth    Anaerobic Culture [[id number]] Collected: 05/10/23 1609    Order Status: Completed Specimen: Tissue from Scrotum Updated: 05/13/23 0817     Anaerobe Culture No Anaerobes Isolated    Tissue Culture - Aerobic [[id number]]  (Abnormal) Collected: 05/10/23 1609    Order Status: Completed Specimen: Tissue from Scrotum Updated: 05/13/23 0749     CULTURE, TISSUE- AEROBIC ([facility]) Few GAMMA STREPTOCOCCUS    Gram Stain [[id number]] Collected: 05/10/23 1609    Order Status: Completed Specimen: Tissue from Scrotum Updated: 05/11/23 1000     GRAM STAIN Few WBC observed      Many Gram Positive Rods      Many Gram Negative Rods      Few Gram positive cocci    Blood culture x two cultures. Draw prior to antibiotics. [[id number]] Collected: 05/10/23 1440    Order Status: Canceled Specimen: Blood from Arm, Right     Blood culture x two cultures. Draw prior to antibiotics. [[id number]]     Order Status: Canceled Specimen: Blood            Imaging  No new imaging     ASSESSMENT & PLAN  68 year old male with history uncontrolled DM2, HTN, HLD, previous MIs, CAD on plavix who presented 5/10 with scrotal pain/swelling/drainage now s/p debridement yasmin's gangrene 5/10. Wound with no further necrotic tissues on dressing change yesterday.      -transitioned to PO Bactrim, fluconazole yesterday  -Dressing change at bedside today              -Wound vac Monday vs Wednesday   -Diabetic diet  -Hospital medicine consulted for patient's poorly controlled diabetes   -Addition of QPM insulin detemir   -insulin aspart 5 units with meals.   -Continue stubbs  -Pulmonary toilet  -PT/OT  -Lovenox for DVT ppx     Dispo: CM following for LTAC dispo      Travis Delgadillo  [employer], MS3  05/13/2023    PGY1 Attestation   I have seen and examined the patient with the medical student above. I agree with the above documentation and the note was edited as  necessary.    César Al MD  [facility] General Surgery

## 2023-05-14 NOTE — MEDICAL/APP STUDENT
General Surgery  Daily Progress Note     HD#4  POD#4 Days Post-Op    SUBJECTIVE / INTERVAL EVENTS  NAEON AF VSS  Pain well controlled  Tolerating diabetic double protein diet with no nausea or vomiting  Passing gas, last BM yesterday  Dressing changes yesterday at bedside with no necrotic changes or purulent drainage.  Pt tolerated the dressing change well.     OBJECTIVE    Vitals  Temp:  [97.9 °F (36.6 °C)-98.5 °F (36.9 °C)] 98.2 °F (36.8 °C)  Pulse:  [78-95] 83  Resp:  [16-18] 18  SpO2:  [95 %-97 %] 97 %  BP: (120-153)/(75-88) 120/75    Intake / Output  UO: 2150 cc  Last Bowel Movement: 05/14/23    Physical Exam:  GEN: comfortable, no acute distress  RESP: breathing comfortably on room air  ABD: soft, nondistended, nontender  : Dressing in place on scrotum      Labs    Lab Results   Component Value Date    WBC 12.50 (H) 05/14/2023    HGB 12.0 (L) 05/14/2023    HCT 36.1 (L) 05/14/2023    MCV 78.6 (L) 05/14/2023     05/14/2023           Recent Labs     05/12/23  0320 05/13/23  0505 05/14/23  0349    137 138   K 4.1 3.9 4.4   CO2 21* 21* 22*   BUN 20.0 15.1 17.4   CREATININE 0.96 0.74 0.84   CALCIUM 8.9 8.9 9.4   MG 2.00 2.00 1.90   PHOS 2.1* 1.9* 2.1*   ALBUMIN 1.8* 1.8* 1.8*   BILITOT 0.6 0.6 0.3   AST 27 28 25   ALKPHOS 123 96 98   ALT 25 26 28        Micro  Microbiology Results (last 7 days)       Procedure Component Value Units Date/Time    Blood culture #1 **CANNOT BE ORDERED STAT** [[id number]]  (Normal) Collected: 05/10/23 1448    Order Status: Completed Specimen: Blood from Arm, Right Updated: 05/13/23 2000     CULTURE, BLOOD ([facility]) No Growth At 72 Hours    Blood culture #2 **CANNOT BE ORDERED STAT** [[id number]]  (Normal) Collected: 05/10/23 1448    Order Status: Completed Specimen: Blood from Hand, Right Updated: 05/13/23 2000     CULTURE, BLOOD ([facility]) No Growth At 72 Hours    Urine culture [[id number]] Collected: 05/10/23 2342    Order Status: Completed Specimen: Urine Updated: 05/13/23 0934      Urine Culture No Growth    Anaerobic Culture [[id number]] Collected: 05/10/23 1609    Order Status: Completed Specimen: Tissue from Scrotum Updated: 05/13/23 0817     Anaerobe Culture No Anaerobes Isolated    Tissue Culture - Aerobic [[id number]]  (Abnormal) Collected: 05/10/23 1609    Order Status: Completed Specimen: Tissue from Scrotum Updated: 05/13/23 0749     CULTURE, TISSUE- AEROBIC ([facility]) Few GAMMA STREPTOCOCCUS    Gram Stain [[id number]] Collected: 05/10/23 1609    Order Status: Completed Specimen: Tissue from Scrotum Updated: 05/11/23 1000     GRAM STAIN Few WBC observed      Many Gram Positive Rods      Many Gram Negative Rods      Few Gram positive cocci    Blood culture x two cultures. Draw prior to antibiotics. [[id number]] Collected: 05/10/23 1440    Order Status: Canceled Specimen: Blood from Arm, Right     Blood culture x two cultures. Draw prior to antibiotics. [[id number]]     Order Status: Canceled Specimen: Blood              Imaging  No new imaging     ASSESSMENT & PLAN  68 year old male with history uncontrolled DM2, HTN, HLD, previous MIs, CAD on plavix who presented 5/10 with scrotal pain/swelling/drainage now s/p debridement yasmin's gangrene 5/10. Wound with no further necrotic tissues on dressing change yesterday.      -Will hold off on further surgical debridement given appearance of wound yesterday  -transitioned to PO Bactrim, fluconazole yesterday  -Dressing change at bedside today              -Wound vac Monday vs Wednesday   -Diabetic diet  -Hospital medicine consulted for patient's poorly controlled diabetes   -Addition of QPM insulin detemir   -insulin aspart 5 units with meals.   -Continue stubbs  -Pulmonary toilet  -PT/OT  -Lovenox for DVT ppx     Dispo: CM following for LTAC dispo      Travis Delgadillo  [employer], MS3  05/13/2023

## 2023-05-14 NOTE — PROGRESS NOTES
[facility] Internal Medicine Progress Note Team 2     Subjective:   Brief HPI:  Mr. 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 IM Consulted by General Surgery for CBG optimization and discharge med recommendations.     Interval History: NAEON. AF. VSS.  This morning he denies fevers, headache, chest pain, SOB, N/V/D and abdominal pain.     ROS:  As per HPI    Objective:   Vitals:  Temp: 98.3 °F (36.8 °C) (05/14 0711)  Pulse: 89 (05/14 0920)  Resp: 20 (05/14 0920)  BP: 137/80 (05/14 0711)  SpO2: 92 % (05/14 0920)    Physical Examination:    Physical Exam  Constitutional:       Appearance: He is obese.   HENT:      Head: Normocephalic and atraumatic.      Nose: Nose normal.      Mouth/Throat:      Mouth: Mucous membranes are moist.      Pharynx: Oropharynx is clear.   Eyes:      Extraocular Movements: Extraocular movements intact.      Pupils: Pupils are equal, round, and reactive to light.   Cardiovascular:      Rate and Rhythm: Normal rate and regular rhythm.      Pulses: Normal pulses.   Pulmonary:      Effort: Pulmonary effort is normal.   Abdominal:      General: Abdomen is flat.      Palpations: Abdomen is soft.   Genitourinary:     Comments: Groin wound w/ dressing intact.   Musculoskeletal:      Right lower leg: No edema.      Left lower leg: No edema.   Skin:     General: Skin is warm and dry.      Capillary Refill: Capillary refill takes less than 2 seconds.   Neurological:      General: No focal deficit present.      Mental Status: He is alert.   Psychiatric:         Mood and Affect: Mood normal.         Laboratory:  CMP:  Recent Labs   Lab 05/12/23  0320 05/13/23  0505 05/14/23  0349    137 138   K 4.1 3.9 4.4   CHLORIDE 108* 108* 108*   CO2 21* 21* 22*   BUN 20.0 15.1 17.4   CREATININE 0.96 0.74 0.84   GLUCOSE 196* 220* 253*   CALCIUM 8.9 8.9 9.4   ALBUMIN 1.8* 1.8* 1.8*   BILITOT 0.6 0.6 0.3   AST 27 28 25   ALT 25 26 28    ALKPHOS 123 96 98     CBC:  Recent Labs   Lab 05/10/23  1440 05/10/23  2005 05/12/23  0350 05/13/23  0505 05/14/23  0349   WBC 24.76*   < > 20.35* 12.84* 12.50*   ABSNEUTRO 20.75*  --  15.94*  --   --    RBC 5.26   < > 4.40* 4.48* 4.59*   HGB 14.1   < > 11.5* 11.8* 12.0*   HCT 41.5*   < > 35.2* 35.0* 36.1*      < > 250 256 315   MCV 78.9*   < > 80.0 78.1* 78.6*   RDW 16.0   < > 16.1 15.9 16.3    < > = values in this interval not displayed.       Electrolytes:  Recent Labs   Lab 05/12/23 0320 05/13/23  0505 05/14/23  0349    137 138   K 4.1 3.9 4.4   CHLORIDE 108* 108* 108*   CALCIUM 8.9 8.9 9.4   MG 2.00 2.00 1.90   PHOS 2.1* 1.9* 2.1*       24hr CBG:  Recent Labs   Lab 05/13/23  1211 05/13/23  1600 05/13/23  1952 05/13/23  2355 05/14/23  0613   POCTGLUCOSE 298* 341* 301* 333* 182*       Radiology:  No results found in the last 24 hours.     Assessment & Plan:   DM-II  Yesi's Gangrene   HTN   CAD  HLD   Tobacco Use   ETOH Abuse   -Low concern for DKA or HHS, not AGMA, won't order further studies at this time.         -Pt on High Dose SSI since inpt.  40 U Long Acting and 38 Short acting on 5/13-5/14. Will give 60 U qHS today and 5 U TID WM. Continue High Dose SSI.     -Hold all other home DM-II meds. Pt shouldn't take Actos, discontinued at last Endocrine visit. Pt shouldn't take Invokana or any other SGLT2  given hx of severe  infection, discontinue on discharge.      -qACHS CBGs or q6 hours if NPO.     -Contact if pt will be NPO for surgical intervention, as will need down titration of Insulin.     -Pt with upcoming Endocrine appt, if discharged in time and able to transport from facility. May be helpful for additional oupt control. Pt most recent A1c which is improved from previous of approx 12. However, pt with inconsistent use of Insulin and poor diet/lifestyle, which likely hampers consistent control. Consider Endocrine consult while inpt per staff.      -Added home Statin, 80 mg qHS.       -Placed on CIWA protocol w/ Thiamine, MVI, and Folic Acid and q4 Ativan 1 mg  for CIWA > 8 or sig signs of withdrawal.      -Rest of care and medication management per primary team.      CODE STATUS: Full Code  Access: PIV   Antimicrobials: Fluconazole 400 mg qd, Bactrim 1 tab BID  Diet: Diabetic Diet   DVT Prophylaxis: Lovenox 40 mg qd   GI Prophylaxis: None   Fluids: PO intake      Disposition: Will up titrate Insulin and give home med recs prior to discharge. May need to consult Endocrine as well. Rest of care per primary team.

## 2023-05-14 NOTE — PLAN OF CARE
Problem: Diabetes Comorbidity  Goal: Blood Glucose Level Within Targeted Range  Outcome: Ongoing, Progressing     Problem: Infection  Goal: Absence of Infection Signs and Symptoms  Outcome: Ongoing, Progressing     Problem: Adult Inpatient Plan of Care  Goal: Absence of Hospital-Acquired Illness or Injury  Outcome: Ongoing, Progressing  Goal: Optimal Comfort and Wellbeing  Outcome: Ongoing, Progressing     Problem: Fall Injury Risk  Goal: Absence of Fall and Fall-Related Injury  Outcome: Ongoing, Progressing     Problem: Pain Acute  Goal: Acceptable Pain Control and Functional Ability  Outcome: Ongoing, Progressing     Problem: Skin Injury Risk Increased  Goal: Skin Health and Integrity  Outcome: Ongoing, Progressing

## 2023-05-15 ENCOUNTER — TELEPHONE (OUTPATIENT)
Dept: INTERNAL MEDICINE | Facility: CLINIC | Age: 68
End: 2023-05-15
Payer: MEDICARE

## 2023-05-15 LAB
ALBUMIN SERPL-MCNC: 1.8 G/DL (ref 3.4–4.8)
ALBUMIN/GLOB SERPL: 0.4 RATIO (ref 1.1–2)
ALP SERPL-CCNC: 105 UNIT/L (ref 40–150)
ALT SERPL-CCNC: 27 UNIT/L (ref 0–55)
AST SERPL-CCNC: 28 UNIT/L (ref 5–34)
BACTERIA BLD CULT: NORMAL
BACTERIA BLD CULT: NORMAL
BACTERIA SPEC ANAEROBE CULT: ABNORMAL
BACTERIA SPEC ANAEROBE CULT: ABNORMAL
BILIRUBIN DIRECT+TOT PNL SERPL-MCNC: 0.4 MG/DL
BUN SERPL-MCNC: 16.3 MG/DL (ref 8.4–25.7)
CALCIUM SERPL-MCNC: 9.2 MG/DL (ref 8.8–10)
CHLORIDE SERPL-SCNC: 106 MMOL/L (ref 98–107)
CO2 SERPL-SCNC: 23 MMOL/L (ref 23–31)
CREAT SERPL-MCNC: 0.79 MG/DL (ref 0.73–1.18)
ERYTHROCYTE [DISTWIDTH] IN BLOOD BY AUTOMATED COUNT: 16.1 % (ref 11.5–17)
GFR SERPLBLD CREATININE-BSD FMLA CKD-EPI: >60 MLS/MIN/1.73/M2
GLOBULIN SER-MCNC: 5 GM/DL (ref 2.4–3.5)
GLUCOSE SERPL-MCNC: 179 MG/DL (ref 82–115)
HCT VFR BLD AUTO: 34.6 % (ref 42–52)
HGB BLD-MCNC: 11.8 G/DL (ref 14–18)
MCH RBC QN AUTO: 26.5 PG (ref 27–31)
MCHC RBC AUTO-ENTMCNC: 34.1 G/DL (ref 33–36)
MCV RBC AUTO: 77.8 FL (ref 80–94)
NRBC BLD AUTO-RTO: 0 %
PLATELET # BLD AUTO: 359 X10(3)/MCL (ref 130–400)
PMV BLD AUTO: 10.4 FL (ref 7.4–10.4)
POCT GLUCOSE: 190 MG/DL (ref 70–110)
POCT GLUCOSE: 254 MG/DL (ref 70–110)
POCT GLUCOSE: 307 MG/DL (ref 70–110)
POCT GLUCOSE: 361 MG/DL (ref 70–110)
POTASSIUM SERPL-SCNC: 4.2 MMOL/L (ref 3.5–5.1)
PROT SERPL-MCNC: 6.8 GM/DL (ref 5.8–7.6)
RBC # BLD AUTO: 4.45 X10(6)/MCL (ref 4.7–6.1)
SODIUM SERPL-SCNC: 136 MMOL/L (ref 136–145)
WBC # SPEC AUTO: 12.4 X10(3)/MCL (ref 4.5–11.5)

## 2023-05-15 PROCEDURE — 94640 AIRWAY INHALATION TREATMENT: CPT

## 2023-05-15 PROCEDURE — 94761 N-INVAS EAR/PLS OXIMETRY MLT: CPT

## 2023-05-15 PROCEDURE — 21400001 HC TELEMETRY ROOM

## 2023-05-15 PROCEDURE — 63600175 PHARM REV CODE 636 W HCPCS: Performed by: STUDENT IN AN ORGANIZED HEALTH CARE EDUCATION/TRAINING PROGRAM

## 2023-05-15 PROCEDURE — 97166 OT EVAL MOD COMPLEX 45 MIN: CPT

## 2023-05-15 PROCEDURE — 85027 COMPLETE CBC AUTOMATED: CPT | Performed by: STUDENT IN AN ORGANIZED HEALTH CARE EDUCATION/TRAINING PROGRAM

## 2023-05-15 PROCEDURE — 25000003 PHARM REV CODE 250: Performed by: STUDENT IN AN ORGANIZED HEALTH CARE EDUCATION/TRAINING PROGRAM

## 2023-05-15 PROCEDURE — 99900035 HC TECH TIME PER 15 MIN (STAT)

## 2023-05-15 PROCEDURE — 63600175 PHARM REV CODE 636 W HCPCS

## 2023-05-15 PROCEDURE — 97162 PT EVAL MOD COMPLEX 30 MIN: CPT

## 2023-05-15 PROCEDURE — 80053 COMPREHEN METABOLIC PANEL: CPT

## 2023-05-15 PROCEDURE — 63700000 PHARM REV CODE 250 ALT 637 W/O HCPCS: Performed by: STUDENT IN AN ORGANIZED HEALTH CARE EDUCATION/TRAINING PROGRAM

## 2023-05-15 RX ORDER — PANTOPRAZOLE SODIUM 40 MG/1
40 TABLET, DELAYED RELEASE ORAL DAILY
Status: DISCONTINUED | OUTPATIENT
Start: 2023-05-15 | End: 2023-05-16 | Stop reason: HOSPADM

## 2023-05-15 RX ORDER — INSULIN ASPART 100 [IU]/ML
0-15 INJECTION, SOLUTION INTRAVENOUS; SUBCUTANEOUS
Status: DISCONTINUED | OUTPATIENT
Start: 2023-05-15 | End: 2023-05-15

## 2023-05-15 RX ORDER — INSULIN ASPART 100 [IU]/ML
0-15 INJECTION, SOLUTION INTRAVENOUS; SUBCUTANEOUS
Status: DISCONTINUED | OUTPATIENT
Start: 2023-05-15 | End: 2023-05-16

## 2023-05-15 RX ORDER — INSULIN ASPART 100 [IU]/ML
10 INJECTION, SOLUTION INTRAVENOUS; SUBCUTANEOUS
Status: DISCONTINUED | OUTPATIENT
Start: 2023-05-15 | End: 2023-05-15

## 2023-05-15 RX ADMIN — FLUTICASONE FUROATE AND VILANTEROL TRIFENATATE 1 PUFF: 200; 25 POWDER RESPIRATORY (INHALATION) at 07:05

## 2023-05-15 RX ADMIN — TAMSULOSIN HYDROCHLORIDE 0.4 MG: 0.4 CAPSULE ORAL at 08:05

## 2023-05-15 RX ADMIN — INSULIN DETEMIR 40 UNITS: 100 INJECTION, SOLUTION SUBCUTANEOUS at 12:05

## 2023-05-15 RX ADMIN — FLUCONAZOLE 400 MG: 100 TABLET ORAL at 09:05

## 2023-05-15 RX ADMIN — FAMOTIDINE 40 MG: 20 TABLET ORAL at 08:05

## 2023-05-15 RX ADMIN — INSULIN ASPART 12 UNITS: 100 INJECTION, SOLUTION INTRAVENOUS; SUBCUTANEOUS at 04:05

## 2023-05-15 RX ADMIN — HYDROMORPHONE HYDROCHLORIDE 1 MG: 1 INJECTION, SOLUTION INTRAMUSCULAR; INTRAVENOUS; SUBCUTANEOUS at 11:05

## 2023-05-15 RX ADMIN — THIAMINE HCL TAB 100 MG 100 MG: 100 TAB at 08:05

## 2023-05-15 RX ADMIN — INSULIN DETEMIR 60 UNITS: 100 INJECTION, SOLUTION SUBCUTANEOUS at 08:05

## 2023-05-15 RX ADMIN — INSULIN ASPART 9 UNITS: 100 INJECTION, SOLUTION INTRAVENOUS; SUBCUTANEOUS at 12:05

## 2023-05-15 RX ADMIN — ASPIRIN 81 MG: 81 TABLET, CHEWABLE ORAL at 08:05

## 2023-05-15 RX ADMIN — INSULIN ASPART 10 UNITS: 100 INJECTION, SOLUTION INTRAVENOUS; SUBCUTANEOUS at 08:05

## 2023-05-15 RX ADMIN — METOPROLOL TARTRATE 50 MG: 50 TABLET, FILM COATED ORAL at 08:05

## 2023-05-15 RX ADMIN — OXYCODONE HYDROCHLORIDE 5 MG: 5 TABLET ORAL at 04:05

## 2023-05-15 RX ADMIN — CLOPIDOGREL BISULFATE 75 MG: 75 TABLET ORAL at 08:05

## 2023-05-15 RX ADMIN — SULFAMETHOXAZOLE AND TRIMETHOPRIM 1 TABLET: 800; 160 TABLET ORAL at 08:05

## 2023-05-15 RX ADMIN — ATORVASTATIN CALCIUM 80 MG: 40 TABLET, FILM COATED ORAL at 08:05

## 2023-05-15 RX ADMIN — MUPIROCIN: 20 OINTMENT TOPICAL at 09:05

## 2023-05-15 RX ADMIN — Medication 5 ML: at 09:05

## 2023-05-15 RX ADMIN — LISINOPRIL 10 MG: 10 TABLET ORAL at 08:05

## 2023-05-15 RX ADMIN — ENOXAPARIN SODIUM 40 MG: 40 INJECTION SUBCUTANEOUS at 04:05

## 2023-05-15 RX ADMIN — PANTOPRAZOLE SODIUM 40 MG: 40 TABLET, DELAYED RELEASE ORAL at 09:05

## 2023-05-15 RX ADMIN — FOLIC ACID 1 MG: 1 TABLET ORAL at 08:05

## 2023-05-15 RX ADMIN — INSULIN ASPART 3 UNITS: 100 INJECTION, SOLUTION INTRAVENOUS; SUBCUTANEOUS at 06:05

## 2023-05-15 NOTE — PLAN OF CARE
Problem: Diabetes Comorbidity  Goal: Blood Glucose Level Within Targeted Range  Outcome: Ongoing, Progressing     Problem: Infection  Goal: Absence of Infection Signs and Symptoms  Outcome: Ongoing, Progressing     Problem: Adult Inpatient Plan of Care  Goal: Absence of Hospital-Acquired Illness or Injury  Outcome: Ongoing, Progressing  Goal: Optimal Comfort and Wellbeing  Outcome: Ongoing, Progressing  Goal: Readiness for Transition of Care  Outcome: Ongoing, Progressing     Problem: Fall Injury Risk  Goal: Absence of Fall and Fall-Related Injury  Outcome: Ongoing, Progressing     Problem: Pain Acute  Goal: Acceptable Pain Control and Functional Ability  Outcome: Ongoing, Progressing     Problem: Skin Injury Risk Increased  Goal: Skin Health and Integrity  Outcome: Ongoing, Progressing

## 2023-05-15 NOTE — PT/OT/SLP EVAL
Occupational Therapy   Evaluation    Name: John Addison  MRN: [id number]  Admitting Diagnosis:   Patient Active Problem List   Diagnosis    Type 2 diabetes mellitus with diabetic neuropathy, without long-term current use of insulin    Coronary artery disease    Tobacco abuse    Hypertension    Hyperlipidemia LDL goal <70    Pain in both lower extremities    Uncontrolled type 2 diabetes mellitus with nephropathy    Abdominal swelling    Abnormal findings on diagnostic imaging of lung    Abnormal liver function tests    Anemia    Chronic allergic conjunctivitis    Chronic obstructive pulmonary disease    Current drinker of alcohol    Intra-abdominal lymphadenopathy    Tegmen defect of base of skull    Cigarette nicotine dependence with nicotine-induced disorder    Atypical chest pain    Xerosis of skin    Dystrophic nail    Onychomycosis of multiple toenails with type 2 diabetes mellitus    Avulsion of toenail of left foot       Recent Surgery: Procedure(s) (LRB):  DEBRIDEMENT, WOUND (N/A) 5 Days Post-Op    Recommendations:     Discharge Recommendations: nursing facility, skilled, [facility] ([facility])  Discharge Equipment Recommendations:  bath bench, walker, rolling  Barriers to discharge:  None    Assessment:     John Addison is a 68 y.o. male with a medical diagnosis of   Patient Active Problem List   Diagnosis    Type 2 diabetes mellitus with diabetic neuropathy, without long-term current use of insulin    Coronary artery disease    Tobacco abuse    Hypertension    Hyperlipidemia LDL goal <70    Pain in both lower extremities    Uncontrolled type 2 diabetes mellitus with nephropathy    Abdominal swelling    Abnormal findings on diagnostic imaging of lung    Abnormal liver function tests    Anemia    Chronic allergic conjunctivitis    Chronic obstructive pulmonary disease    Current drinker of alcohol    Intra-abdominal lymphadenopathy    Tegmen defect of base of skull    Cigarette nicotine  dependence with nicotine-induced disorder    Atypical chest pain    Xerosis of skin    Dystrophic nail    Onychomycosis of multiple toenails with type 2 diabetes mellitus    Avulsion of toenail of left foot     .  He presents with functional decline. Performance deficits affecting function: weakness, impaired self care skills, impaired functional mobility, pain.      Rehab Prognosis: Good; patient would benefit from acute skilled OT services to address these deficits and reach maximum level of function.       Plan:     Patient to be seen 3 x/week to address the above listed problems via self-care/home management, therapeutic activities, therapeutic exercises  Plan of Care Expires:  (d/c)  Plan of Care Reviewed with: patient    Subjective     Chief Complaint: pain  Patient/Family Comments/goals: none stated    Occupational Profile:  Living Environment: Pt reported he lives alone in a 2nd story apartment, 12 steps to enter with B rails, tub/shower combo.  Previous level of function: Pt stated he performed all self care skills on his own, meal prep, home making tasks on his own.  He stated his sister and daughter go grocery shopping for him.  Roles and Routines: Pt doesn't drive or work.  Equipment Used at Home: none  Assistance upon Discharge: family care as needed    Pain/Comfort:  Pain Rating 1: 10/10  Location 1: groin  Pain Rating Post-Intervention 1: 10/10  Pain Addressed 2: Nurse notified, Reposition    Patients cultural, spiritual, Restoration conflicts given the current situation: no    Objective:     Communicated with: nurse Manrique prior to session.  Patient found supine with peripheral IV, telemetry upon OT entry to room.    General Precautions: Standard,    Orthopedic Precautions: N/A  Braces: N/A  Respiratory Status: Room air    Occupational Performance:    Bed Mobility:    Patient completed Supine to Sit with stand by assistance  Patient completed Sit to Supine with minimum assistance    Functional  Mobility/Transfers:  Patient completed Sit <> Stand Transfer with contact guard assistance  with  rolling walker   Functional Mobility: Pt ambulated CGA with RW.    Activities of Daily Living:  Difficulty reaching distal BLE secondary to pain.    Cognitive/Visual Perceptual:  Cognitive/Psychosocial Skills:     -       Oriented to: Person, Place, Time, and Situation   -       Safety awareness/insight to disability: intact     Physical Exam:  Sensation:    -       Intact to BUE  Dominant hand:  R  Upper Extremity Strength:  WNL BUE  Fine Motor Coordination:    -       Intact with thumb opposition    Treatment & Education:  Pt. educated on OT goals, POC, orientation to environment, use of call bell for assist with transfers OOB or for any other needs due to fall risk.    Patient left  with P.T.  with all lines intact, call button in reach, and nurse Hilaria notified    GOALS:   Multidisciplinary Problems       Occupational Therapy Goals          Problem: Occupational Therapy    Goal Priority Disciplines Outcome Interventions   Occupational Therapy Goal     OT, PT/OT Ongoing, Progressing    Description: Goals to be met by: d/c     Patient will increase functional independence with ADLs by performing:    LE Dressing with Modified [place].  Supine to sit with Modified [place].  Step transfer with Modified [place]                         History:     Past Medical History:   Diagnosis Date    Coronary artery disease     Diabetes mellitus     Hypertension          Past Surgical History:   Procedure Laterality Date    CORONARY ANGIOPLASTY      TONSILLECTOMY  07/2018    WOUND DEBRIDEMENT N/A 5/10/2023    Procedure: DEBRIDEMENT, WOUND;  Surgeon: Smith Lay MD;  Location: [facility];  Service: General;  Laterality: N/A;  perineum debridement, lithotomy       Time Tracking:     OT Date of Treatment: 05/15/23  OT Start Time: 1100  OT Stop Time: 1145  OT Total Time (min): 45 min    Billable Minutes:Evaluation 45  min    5/15/2023

## 2023-05-15 NOTE — PLAN OF CARE
Problem: Occupational Therapy  Goal: Occupational Therapy Goal  Description: Goals to be met by: d/c     Patient will increase functional independence with ADLs by performing:    LE Dressing with Modified [place].  Supine to sit with Modified [place].  Step transfer with Modified [place]    Outcome: Ongoing, Progressing

## 2023-05-15 NOTE — TELEPHONE ENCOUNTER
Daughter Chen requesting per Mr John Desouza housing [facility] needs letter stating he needs an apartment located downstairs. Please advise   Detail Level: Zone

## 2023-05-15 NOTE — PROGRESS NOTES
U Internal Medicine Progress Note Team 2     Subjective:   Brief HPI:  Mr. 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 IM Consulted by General Surgery for CBG optimization and discharge med recommendations.     Interval History: NAEON. AF. VSS.  This morning he denies fevers, headache, chest pain, SOB, N/V/D and abdominal pain.     ROS:  As per HPI    Objective:   Vitals:  Temp: 98.3 °F (36.8 °C) (05/15 0439)  Pulse: 86 (05/15 0732)  Resp: 19 (05/15 0732)  BP: 122/76 (05/15 0439)  SpO2: 97 % (05/15 0732)    Physical Examination:    Physical Exam  Constitutional:       Appearance: He is obese.   HENT:      Head: Normocephalic and atraumatic.      Nose: Nose normal.      Mouth/Throat:      Mouth: Mucous membranes are moist.      Pharynx: Oropharynx is clear.   Eyes:      Extraocular Movements: Extraocular movements intact.      Pupils: Pupils are equal, round, and reactive to light.   Cardiovascular:      Rate and Rhythm: Normal rate and regular rhythm.      Pulses: Normal pulses.   Pulmonary:      Effort: Pulmonary effort is normal.   Abdominal:      General: Abdomen is flat.      Palpations: Abdomen is soft.   Genitourinary:     Comments: Groin wound w/ dressing intact.   Musculoskeletal:      Right lower leg: No edema.      Left lower leg: No edema.   Skin:     General: Skin is warm and dry.      Capillary Refill: Capillary refill takes less than 2 seconds.   Neurological:      General: No focal deficit present.      Mental Status: He is alert.   Psychiatric:         Mood and Affect: Mood normal.         Laboratory:  CMP:  Recent Labs   Lab 05/12/23  0320 05/13/23  0505 05/14/23  0349    137 138   K 4.1 3.9 4.4   CHLORIDE 108* 108* 108*   CO2 21* 21* 22*   BUN 20.0 15.1 17.4   CREATININE 0.96 0.74 0.84   GLUCOSE 196* 220* 253*   CALCIUM 8.9 8.9 9.4   ALBUMIN 1.8* 1.8* 1.8*   BILITOT 0.6 0.6 0.3   AST 27 28 25   ALT 25 26 28    ALKPHOS 123 96 98       CBC:  Recent Labs   Lab 05/10/23  1440 05/10/23  2005 05/12/23  0350 05/13/23  0505 05/14/23  0349 05/15/23  0343   WBC 24.76*   < > 20.35* 12.84* 12.50* 12.40*   ABSNEUTRO 20.75*  --  15.94*  --   --   --    RBC 5.26   < > 4.40* 4.48* 4.59* 4.45*   HGB 14.1   < > 11.5* 11.8* 12.0* 11.8*   HCT 41.5*   < > 35.2* 35.0* 36.1* 34.6*      < > 250 256 315 359   MCV 78.9*   < > 80.0 78.1* 78.6* 77.8*   RDW 16.0   < > 16.1 15.9 16.3 16.1    < > = values in this interval not displayed.         Electrolytes:  Recent Labs   Lab 05/12/23 0320 05/13/23  0505 05/14/23  0349    137 138   K 4.1 3.9 4.4   CHLORIDE 108* 108* 108*   CALCIUM 8.9 8.9 9.4   MG 2.00 2.00 1.90   PHOS 2.1* 1.9* 2.1*         24hr CBG:  Recent Labs   Lab 05/14/23  0613 05/14/23  1058 05/14/23  1536 05/14/23  1949 05/15/23  0609   POCTGLUCOSE 182* 344* 366* 236* 190*         Radiology:  No results found in the last 24 hours.     Assessment & Plan:   DM-II  Yesi's Gangrene   HTN   CAD  HLD   Tobacco Use   ETOH Abuse   -Low concern for DKA or HHS, won't order further studies at this time.         -Continue 60 U qHS, will increase meal time to TID WM. Continue High Dose SSI. May increase qHS dose pending course while inpt. Will monitor CBGs throughout the day.     -Hold all other home DM-II meds. Pt shouldn't take Actos, discontinued at last Endocrine visit. Pt shouldn't take Invokana or any other SGLT2  given hx of severe  infection, discontinue on discharge.      -qACHS CBGs or q6 hours if NPO.     -Contact if pt will be NPO for surgical intervention, as will need down titration of Insulin.     -Pt with upcoming Endocrine appt, if discharged in time and able to transport from facility. May be helpful for additional oupt control. Pt most recent A1c which is improved from previous of approx 12. However, pt with inconsistent use of Insulin and poor diet/lifestyle, which likely hampers consistent control. Consider  Endocrine consult while inpt per staff.      -Added home Statin, 80 mg qHS.      -Placed on CIWA protocol w/ Thiamine, MVI, and Folic Acid and q4 Ativan 1 mg  for CIWA > 8 or sig signs of withdrawal.      -Rest of care and medication management per primary team.      CODE STATUS: Full Code  Access: PIV   Antimicrobials: Fluconazole 400 mg qd, Bactrim 1 tab BID  Diet: Diabetic Diet   DVT Prophylaxis: Lovenox 40 mg qd   GI Prophylaxis: None   Fluids: PO intake      Disposition: Will up titrate Insulin and give home med recs prior to discharge. May need to consult Endocrine as well pending course. Rest of care per primary team.

## 2023-05-15 NOTE — PROGRESS NOTES
General Surgery  Daily Progress Note     HD#5  POD#5 Days Post-Op    SUBJECTIVE / INTERVAL EVENTS  68 year old male post op day 5 for debridement of necrotic perineal and scrotal tissue concerning for yasmin's gangrene; patient reports sleeping well and having no issues overnight  NAEON AF VSS  Pain well controlled  Tolerating diet with no nausea or vomiting  Passing gas, last BM yesterday  Diego removed yesterday, voiding without issue since      OBJECTIVE    Vitals  Temp:  [98 °F (36.7 °C)-98.6 °F (37 °C)] 98.6 °F (37 °C)  Pulse:  [80-96] 96  Resp:  [18-20] 19  SpO2:  [92 %-99 %] 99 %  BP: (117-163)/(69-90) 130/77    Intake / Output  PO: 960 cc  UO: 300 cc  Last Bowel Movement: 05/14/23  Drain Output:\    Physical Exam:  GEN: comfortable, no acute distress  RESP: breathing comfortably on room air  ABD: soft, nondistended, nontender  Groin: appropriate mild tenderness in region of wound, dressing is dry and intact, and without discharge or significant blood or leakage.       Labs    Lab Results   Component Value Date    WBC 12.40 (H) 05/15/2023    HGB 11.8 (L) 05/15/2023    HCT 34.6 (L) 05/15/2023    MCV 77.8 (L) 05/15/2023     05/15/2023           Recent Labs     05/13/23  0505 05/14/23  0349 05/15/23  0431    138 136   K 3.9 4.4 4.2   CO2 21* 22* 23   BUN 15.1 17.4 16.3   CREATININE 0.74 0.84 0.79   CALCIUM 8.9 9.4 9.2   MG 2.00 1.90  --    PHOS 1.9* 2.1*  --    ALBUMIN 1.8* 1.8* 1.8*   BILITOT 0.6 0.3 0.4   AST 28 25 28   ALKPHOS 96 98 105   ALT 26 28 27          Micro  Microbiology Results (last 7 days)       Procedure Component Value Units Date/Time    Blood culture #1 **CANNOT BE ORDERED STAT** [[id number]]  (Normal) Collected: 05/10/23 4901    Order Status: Completed Specimen: Blood from Arm, Right Updated: 05/14/23 2000     CULTURE, BLOOD (OHS) No Growth At 96 Hours    Blood culture #2 **CANNOT BE ORDERED STAT** [[id number]]  (Normal) Collected: 05/10/23 1448    Order Status: Completed  Specimen: Blood from Hand, Right Updated: 05/14/23 2000     CULTURE, BLOOD ([facility]) No Growth At 96 Hours    Tissue Culture - Aerobic [[id number]]  (Abnormal)  (Susceptibility) Collected: 05/10/23 1609    Order Status: Completed Specimen: Tissue from Scrotum Updated: 05/14/23 1215     CULTURE, TISSUE- AEROBIC ([facility]) Few Enterococcus faecalis     Comment: Ampicillin results may be used to predict susceptibility to amoxicillin-clavulanate, ampicillin-sulbactam, and piperacillin-tazobactam.       Anaerobic Culture [[id number]] Collected: 05/10/23 1609    Order Status: Completed Specimen: Tissue from Scrotum Updated: 05/14/23 0919     Anaerobe Culture No Anaerobes Isolated    Urine culture [[id number]] Collected: 05/10/23 2342    Order Status: Completed Specimen: Urine Updated: 05/13/23 0923     Urine Culture No Growth    Gram Stain [[id number]] Collected: 05/10/23 1609    Order Status: Completed Specimen: Tissue from Scrotum Updated: 05/11/23 1000     GRAM STAIN Few WBC observed      Many Gram Positive Rods      Many Gram Negative Rods      Few Gram positive cocci    Blood culture x two cultures. Draw prior to antibiotics. [[id number]] Collected: 05/10/23 1440    Order Status: Canceled Specimen: Blood from Arm, Right     Blood culture x two cultures. Draw prior to antibiotics. [[id number]]     Order Status: Canceled Specimen: Blood              Imaging      ASSESSMENT & PLAN  68 year old male at post op day 5 s/p debridement is feeling well and improving symptoms and no residual necrotic tissue visible on last wound change    -Continue bactrim, fluconazole  -Continue daily dressing changes  -Wound care to evaluate for wound vac today  -Internal medicine managing diabetes medications, appreciate assistance - consider endocrine consult per IM recs  -IS, pulmonary toilet  -PT/OT, encouraged OOB and ambulation  -CM working on SNF placement  -Lovenox for DVT ppx    Lloyd [id number], MS3  5/15/2023  5:45 AM     PGY1 Attestation    I have seen and examined the patient with the medical student above. I agree with the above documentation and the note was edited as necessary.    César Al MD  [facility] General Surgery

## 2023-05-15 NOTE — MEDICAL/APP STUDENT
General Surgery  Daily Progress Note     HD#5  POD#5 Days Post-Op    SUBJECTIVE / INTERVAL EVENTS  68 year old male post op day 5 for debridement of necrotic perineal and scrotal tissue concerning for yasmin's gangrene; patient reports sleeping well and having no issues overnight  NAEON AF VSS  Pain well controlled  Tolerating diet with no nausea or vomiting  Passing gas, last BM    OBJECTIVE    Vitals  Temp:  [98 °F (36.7 °C)-98.4 °F (36.9 °C)] 98.3 °F (36.8 °C)  Pulse:  [80-96] 83  Resp:  [18-20] 20  SpO2:  [92 %-99 %] 97 %  BP: (117-163)/(69-90) 122/76    Intake / Output  PO: 960 cc  UO: 300 cc  Last Bowel Movement: 05/14/23  Drain Output:\    Physical Exam:  GEN: comfortable, no acute distress  RESP: breathing comfortably on room air  ABD: soft, nondistended, nontender  Groin: appropriate mild tenderness in region of wound, dressing is dry and intact, and without discharge or significant blood or leakage.       Labs    Lab Results   Component Value Date    WBC 12.40 (H) 05/15/2023    HGB 11.8 (L) 05/15/2023    HCT 34.6 (L) 05/15/2023    MCV 77.8 (L) 05/15/2023     05/15/2023           Recent Labs     05/13/23  0505 05/14/23  0349    138   K 3.9 4.4   CO2 21* 22*   BUN 15.1 17.4   CREATININE 0.74 0.84   CALCIUM 8.9 9.4   MG 2.00 1.90   PHOS 1.9* 2.1*   ALBUMIN 1.8* 1.8*   BILITOT 0.6 0.3   AST 28 25   ALKPHOS 96 98   ALT 26 28        Micro  Microbiology Results (last 7 days)       Procedure Component Value Units Date/Time    Blood culture #1 **CANNOT BE ORDERED STAT** [[id number]]  (Normal) Collected: 05/10/23 1448    Order Status: Completed Specimen: Blood from Arm, Right Updated: 05/14/23 2000     CULTURE, BLOOD (OHS) No Growth At 96 Hours    Blood culture #2 **CANNOT BE ORDERED STAT** [[id number]]  (Normal) Collected: 05/10/23 1448    Order Status: Completed Specimen: Blood from Hand, Right Updated: 05/14/23 2000     CULTURE, BLOOD (OHS) No Growth At 96 Hours    Tissue Culture - Aerobic  [[id number]]  (Abnormal)  (Susceptibility) Collected: 05/10/23 1609    Order Status: Completed Specimen: Tissue from Scrotum Updated: 05/14/23 1215     CULTURE, TISSUE- AEROBIC ([facility]) Few Enterococcus faecalis     Comment: Ampicillin results may be used to predict susceptibility to amoxicillin-clavulanate, ampicillin-sulbactam, and piperacillin-tazobactam.       Anaerobic Culture [[id number]] Collected: 05/10/23 1609    Order Status: Completed Specimen: Tissue from Scrotum Updated: 05/14/23 0919     Anaerobe Culture No Anaerobes Isolated    Urine culture [[id number]] Collected: 05/10/23 2342    Order Status: Completed Specimen: Urine Updated: 05/13/23 0923     Urine Culture No Growth    Gram Stain [[id number]] Collected: 05/10/23 1609    Order Status: Completed Specimen: Tissue from Scrotum Updated: 05/11/23 1000     GRAM STAIN Few WBC observed      Many Gram Positive Rods      Many Gram Negative Rods      Few Gram positive cocci    Blood culture x two cultures. Draw prior to antibiotics. [[id number]] Collected: 05/10/23 1440    Order Status: Canceled Specimen: Blood from Arm, Right     Blood culture x two cultures. Draw prior to antibiotics. [[id number]]     Order Status: Canceled Specimen: Blood              Imaging      ASSESSMENT & PLAN  68 year old male at post op day 5 s/p debridement is feeling well and improving symptoms and no residual necrotic tissue visible on last wound change  -wound vac will be done this week  -dressing will be changed intermittently  -will continue IV antibiotics, antifungals; consider transition to oral antibiotics with bactrim and diflucan    Lloyd Everett, MS3    5/15/2023  5:45 AM

## 2023-05-15 NOTE — PT/OT/SLP EVAL
"Physical Therapy Evaluation    Patient Name:  John Addison   MRN:  [id number]    Recommendations:     Discharge Recommendations:  nursing facility, skilled   Discharge Equipment Recommendations: bath bench, walker, rolling   Barriers to discharge:  2nd floor apartment, severity of deficits, and medical diagnosis    Assessment:     John Addison is a 68 y.o. male admitted with a medical diagnosis of   1. Yasmin's gangrene    2. Sepsis    3. ETOH abuse    Per medical documentation "68 year old male post op day 5 for debridement of necrotic perineal and scrotal tissue concerning for yasmin's gangrene . Onset was about 2-3 days prior to admit when he noticed pain and swelling to his scrotum that he states is just gotten rapidly worse, stating "I just came out of no where and did not give me any time." Pain is constant, severe, aggravated with sitting and touching the area.  Associated symptoms of lower abdominal pain.  States it has been having some foul-smelling drainage coming from his scrotum. "    He presents with the following impairments/functional limitations:  weakness, impaired self care skills, impaired functional mobility, gait instability, impaired balance, pain, decreased lower extremity function, impaired skin.    Rehab Prognosis: Good.    Patient would benefit from continued skilled acute PT services to: address above listed impairments/functional limitations; receive patient/caregiver education; reduce fall risk; and maximize independency/safety with functional mobility.    Recent Surgery: Procedure(s) (LRB):  DEBRIDEMENT, WOUND (N/A) 5 Days Post-Op    Plan:     During this hospitalization, patient to be seen  (3-5 x week) to address the identified impairments/functional limitations via gait training, therapeutic activities, therapeutic exercises and progress toward the established goals.    Plan of Care Expires:  06/14/23    Subjective     Communicated with patient's nurse  prior to " session.    Patient agreeable to participate in evaluation.     Chief Complaint: pain  Patient/Family Comments/goals: 2nd floor apartment and living alone  Pain/Comfort:  Pain Rating 1: 10/10  Location 1: groin  Pain Addressed 1: Distraction, Cessation of Activity  Pain Rating Post-Intervention 1: 10/10    Patients cultural, spiritual, Gnosticism conflicts given the current situation: no    Social History:  Living Environment: Patient lives alone in a second floor apartment, with  1 flight steps outside, with bilat handrails, with tub-shower combo.  Functional Level: Prior to admission patient was not driving, ambulated without assistive device, and was independent in ADL's.  Equipment at Home :none  Assistance Upon Discharge: family. Sister and daughter were driving and shopping    Objective:     Patient found supine in bed and with HOB elevated with peripheral IV, telemetry  upon PT entry to room.  OT present for eval  General Precautions: Standard,     Orthopedic Precautions:N/A   Braces: N/A  Respiratory Status: room air    Vitals   At Rest (pre-session)  BP  155/92   HR  79   O2 Sat %  99      With Activity (post-session)  BP  163/86   HR  80   O2 Sat %  99     Exams:  Orientation: Patient is oriented to Person, Place, Time, Situation  Commands: Patient follows commands consistently  Sensation:    -     Intact: light/touch bilat lower extremity  BILAT UE ROM/strength - defer to OT - see OT note for details  RLE ROM:  limited at hips due to pain and wound  RLE Strength:  unable to fully assess hips; 3/5 distally  LLE ROM:  limited at hips due to pain and wound  LLE Strength:  unable to fully assess hips; 3/5 distally  Bandages scrotum intact     Functional Mobility:    Bed Mobility:  Supine to Sit: stand by assistance  Sit to Supine: minimum assistance    Transfers:  Sit to Stand: minimum assistance with rolling walker    Gait:  Patient ambulated 40ft with rolling walker and minimum assistance.  Patient  demonstrates decreased dianna, decreased step length, wide base of support, and flexed posture.    Other Mobility:  Therapeutic Activities performed:        -sitting balance activities:              scooting:                   -forward            repositioning at edge of bed    Balance:  Sit  Static: FAIR: Maintains without assist, but unable to take any challenges   Dynamic: FAIR: Cannot move trunk without losing balance  Stand  Static: FAIR: Maintains without assist but unable to take challenges  Dynamic: FAIR: Needs CONTACT GUARD during gait    Patient left supine in bed and with HOB elevated with all lines intact, call button in reach, and nurse notified.    Education     Patient was instructed to utilize staff assistance for mobility/transfers.  White board updated regarding patient's safest level of mobility with staff assistance.    Goals     Multidisciplinary Problems       Physical Therapy Goals          Problem: Physical Therapy    Goal Priority Disciplines Outcome Goal Variances Interventions   Physical Therapy Goal     PT, PT/OT      Description: Goals to be met by: discharge     Patient will increase functional independence with mobility by performin. Supine to sit with Modified [place]  2. Sit to supine with Modified [place]  3. Sit to stand transfer with Modified [place]  4. Gait  x 130 feet with Supervision using Rolling Walker.   5. Ascend/descend 10 stair with bilateral Handrails Stand-by Assistance using No Assistive Device.                        History:     Past Medical History:   Diagnosis Date    Coronary artery disease     Diabetes mellitus     Hypertension      Past Surgical History:   Procedure Laterality Date    CORONARY ANGIOPLASTY      TONSILLECTOMY  2018    WOUND DEBRIDEMENT N/A 5/10/2023    Procedure: DEBRIDEMENT, WOUND;  Surgeon: Smith Lay MD;  Location: [facility];  Service: General;  Laterality: N/A;  perineum debridement, lithotomy     Time  Tracking:     PT Received On: 05/15/23  PT Start Time: 1132     PT Stop Time: 1207  PT Total Time (min): 35 min     Billable Minutes: Evaluation 35    05/15/2023

## 2023-05-16 VITALS
OXYGEN SATURATION: 98 % | HEIGHT: 70 IN | WEIGHT: 260 LBS | DIASTOLIC BLOOD PRESSURE: 78 MMHG | TEMPERATURE: 98 F | RESPIRATION RATE: 22 BRPM | BODY MASS INDEX: 37.22 KG/M2 | HEART RATE: 91 BPM | SYSTOLIC BLOOD PRESSURE: 145 MMHG

## 2023-05-16 PROBLEM — N49.3 FOURNIER'S GANGRENE: Status: RESOLVED | Noted: 2023-05-16 | Resolved: 2023-05-16

## 2023-05-16 PROBLEM — N49.3 FOURNIER'S GANGRENE: Status: ACTIVE | Noted: 2023-05-16

## 2023-05-16 PROBLEM — A41.9 SEPSIS: Status: ACTIVE | Noted: 2023-05-16

## 2023-05-16 PROBLEM — A41.9 SEPSIS: Status: RESOLVED | Noted: 2023-05-16 | Resolved: 2023-05-16

## 2023-05-16 LAB
POCT GLUCOSE: 218 MG/DL (ref 70–110)
POCT GLUCOSE: 297 MG/DL (ref 70–110)
POCT GLUCOSE: 406 MG/DL (ref 70–110)
SARS-COV-2 RDRP RESP QL NAA+PROBE: NEGATIVE

## 2023-05-16 PROCEDURE — 63600175 PHARM REV CODE 636 W HCPCS

## 2023-05-16 PROCEDURE — 63700000 PHARM REV CODE 250 ALT 637 W/O HCPCS: Performed by: STUDENT IN AN ORGANIZED HEALTH CARE EDUCATION/TRAINING PROGRAM

## 2023-05-16 PROCEDURE — 25000242 PHARM REV CODE 250 ALT 637 W/ HCPCS: Performed by: STUDENT IN AN ORGANIZED HEALTH CARE EDUCATION/TRAINING PROGRAM

## 2023-05-16 PROCEDURE — 87635 SARS-COV-2 COVID-19 AMP PRB: CPT

## 2023-05-16 PROCEDURE — 94640 AIRWAY INHALATION TREATMENT: CPT

## 2023-05-16 PROCEDURE — 25000003 PHARM REV CODE 250: Performed by: STUDENT IN AN ORGANIZED HEALTH CARE EDUCATION/TRAINING PROGRAM

## 2023-05-16 PROCEDURE — 94761 N-INVAS EAR/PLS OXIMETRY MLT: CPT

## 2023-05-16 PROCEDURE — 63600175 PHARM REV CODE 636 W HCPCS: Performed by: STUDENT IN AN ORGANIZED HEALTH CARE EDUCATION/TRAINING PROGRAM

## 2023-05-16 PROCEDURE — 99900035 HC TECH TIME PER 15 MIN (STAT)

## 2023-05-16 RX ORDER — INSULIN ASPART 100 [IU]/ML
0-15 INJECTION, SOLUTION INTRAVENOUS; SUBCUTANEOUS
Status: DISCONTINUED | OUTPATIENT
Start: 2023-05-16 | End: 2023-05-16 | Stop reason: HOSPADM

## 2023-05-16 RX ORDER — LANOLIN ALCOHOL/MO/W.PET/CERES
100 CREAM (GRAM) TOPICAL DAILY
Status: ON HOLD
Start: 2023-05-17 | End: 2023-07-05 | Stop reason: SDUPTHER

## 2023-05-16 RX ORDER — INSULIN ASPART 100 [IU]/ML
0-15 INJECTION, SOLUTION INTRAVENOUS; SUBCUTANEOUS
Status: ON HOLD
Start: 2023-05-16 | End: 2023-07-05 | Stop reason: HOSPADM

## 2023-05-16 RX ORDER — ENOXAPARIN SODIUM 100 MG/ML
40 INJECTION SUBCUTANEOUS DAILY
Qty: 12 ML | Refills: 0 | Status: ON HOLD
Start: 2023-05-16 | End: 2023-06-14 | Stop reason: HOSPADM

## 2023-05-16 RX ORDER — FLUCONAZOLE 200 MG/1
400 TABLET ORAL DAILY
Qty: 60 TABLET | Refills: 0 | Status: ON HOLD
Start: 2023-05-17 | End: 2023-06-14 | Stop reason: HOSPADM

## 2023-05-16 RX ORDER — TAMSULOSIN HYDROCHLORIDE 0.4 MG/1
0.4 CAPSULE ORAL 2 TIMES DAILY
Qty: 60 CAPSULE | Refills: 11 | Status: ON HOLD
Start: 2023-05-16 | End: 2023-07-05 | Stop reason: SDUPTHER

## 2023-05-16 RX ORDER — SULFAMETHOXAZOLE AND TRIMETHOPRIM 800; 160 MG/1; MG/1
1 TABLET ORAL 2 TIMES DAILY
Status: ON HOLD
Start: 2023-05-16 | End: 2023-06-14 | Stop reason: HOSPADM

## 2023-05-16 RX ORDER — FOLIC ACID 1 MG/1
1 TABLET ORAL DAILY
Refills: 0 | Status: ON HOLD
Start: 2023-05-17 | End: 2023-07-05 | Stop reason: SDUPTHER

## 2023-05-16 RX ADMIN — SULFAMETHOXAZOLE AND TRIMETHOPRIM 1 TABLET: 800; 160 TABLET ORAL at 08:05

## 2023-05-16 RX ADMIN — FLUTICASONE FUROATE AND VILANTEROL TRIFENATATE 1 PUFF: 200; 25 POWDER RESPIRATORY (INHALATION) at 07:05

## 2023-05-16 RX ADMIN — INSULIN ASPART 9 UNITS: 100 INJECTION, SOLUTION INTRAVENOUS; SUBCUTANEOUS at 12:05

## 2023-05-16 RX ADMIN — INSULIN DETEMIR 60 UNITS: 100 INJECTION, SOLUTION SUBCUTANEOUS at 09:05

## 2023-05-16 RX ADMIN — METOPROLOL TARTRATE 50 MG: 50 TABLET, FILM COATED ORAL at 08:05

## 2023-05-16 RX ADMIN — ALBUTEROL SULFATE 2 PUFF: 90 AEROSOL, METERED RESPIRATORY (INHALATION) at 07:05

## 2023-05-16 RX ADMIN — THIAMINE HCL TAB 100 MG 100 MG: 100 TAB at 08:05

## 2023-05-16 RX ADMIN — INSULIN ASPART 15 UNITS: 100 INJECTION, SOLUTION INTRAVENOUS; SUBCUTANEOUS at 05:05

## 2023-05-16 RX ADMIN — ASPIRIN 81 MG: 81 TABLET, CHEWABLE ORAL at 08:05

## 2023-05-16 RX ADMIN — FLUCONAZOLE 400 MG: 100 TABLET ORAL at 09:05

## 2023-05-16 RX ADMIN — INSULIN ASPART 6 UNITS: 100 INJECTION, SOLUTION INTRAVENOUS; SUBCUTANEOUS at 06:05

## 2023-05-16 RX ADMIN — TAMSULOSIN HYDROCHLORIDE 0.4 MG: 0.4 CAPSULE ORAL at 08:05

## 2023-05-16 RX ADMIN — ACETAMINOPHEN 1000 MG: 500 TABLET, FILM COATED ORAL at 05:05

## 2023-05-16 RX ADMIN — CLOPIDOGREL BISULFATE 75 MG: 75 TABLET ORAL at 08:05

## 2023-05-16 RX ADMIN — ACETAMINOPHEN 1000 MG: 500 TABLET, FILM COATED ORAL at 09:05

## 2023-05-16 RX ADMIN — ENOXAPARIN SODIUM 40 MG: 40 INJECTION SUBCUTANEOUS at 05:05

## 2023-05-16 RX ADMIN — LISINOPRIL 10 MG: 10 TABLET ORAL at 08:05

## 2023-05-16 RX ADMIN — PANTOPRAZOLE SODIUM 40 MG: 40 TABLET, DELAYED RELEASE ORAL at 08:05

## 2023-05-16 RX ADMIN — Medication 5 ML: at 09:05

## 2023-05-16 RX ADMIN — HYDROMORPHONE HYDROCHLORIDE 1 MG: 1 INJECTION, SOLUTION INTRAMUSCULAR; INTRAVENOUS; SUBCUTANEOUS at 05:05

## 2023-05-16 RX ADMIN — FOLIC ACID 1 MG: 1 TABLET ORAL at 08:05

## 2023-05-16 NOTE — PROGRESS NOTES
Pt accepted for LTAC  placement by [facility]. Informed dghtr, Chen Dill. Will arrange transport via [facility].

## 2023-05-16 NOTE — DISCHARGE INSTRUCTIONS
Pt has been given discharge instructions and is being transferred to LTAC rehabilitation facility. Report given to nurse Jackson. Pt stated understanding

## 2023-05-16 NOTE — DISCHARGE SUMMARY
[facility] Tele[facility]  General Surgery  Discharge Summary      Patient Name: John Addison  MRN: [id number]  Admission Date: 5/10/2023  Hospital Length of Stay: 6 days  Discharge Date and Time:  05/16/2023 12:59 PM  Attending Physician: Smith Lay MD   Discharging Provider: César Al MD  Primary Care Provider: Cammy Christianson NP     HPI: 68 year old male with past medical history of HTN, HLD, uncontrolled diabetes mellitus type 2, multiple previous MI s/p PCI w/ stenting, tobacco abuse, and CAD on plavix presents to the ED with diffuse groin pain and drainage from his penis and scrotum for the past 2 days. The patient reports intense pain that begins at the superior aspect of his groin and extends to the inferior pole of his scrotum. He reports that this began spontaneously and that he has had no known trauma or known laceration. He reports no pain in his abdomen, and that he has had no nausea, vomiting, or fever. The patient reports one other occurrence of this type of problem 2 years ago, and that he did not seek medical intervention. The problem self healed the previous occasion. Surgery was consulted due to concern for Yesi's gangrene.    Procedure(s) (LRB):  DEBRIDEMENT, WOUND (N/A)     Hospital Course: Patient was started on broad spectrum IV antibiotics and taken for emergent debridement of his necrotic scrotal and perineal tissue 5/10. Of note during the procedure patient was noted to have what appeared to be a significant phimosis making stubsb placement very difficult. He tolerated the procedure well and was stable post-op but admitted to ICU for close monitoring. He continued to recover well and was stepped down POD#1. His wound appeared clean without any further necrotic tissue so no further operative debridement was performed. Wound was managed with daily wet-to-dry dressing changes. He was transitioned to PO antibiotics and antifungals 5/13. IM was consulted for  assistance in blood glucose control and he was improved on a regimen on 60 units Detemir BID and high dose SSI. Case management was consulted for placement and patient was accepted to LTAC 5/16. Patient was discharged on PO bactrim and fluconazole with end date 5/20. He was discharged on 60 units detemir BID as well as high dose SSI. Ideally wound vac will be placed at LTAC but, if not, daily wet-to-dry dressing changes should be performed. Patient was referred to wound care clinic, diabetes management clinic, urology clinic and scheduled for general surgery follow-up.     Consults:   Consults (From admission, onward)          Status Ordering Provider     Inpatient consult to Social Work/Case Management  Once        Provider:  (Not yet assigned)    Completed SASKIA PEREZ     Inpatient consult to Social Work/Case Management  Once        Provider:  (Not yet assigned)    Completed SASKIA PEREZ     Inpatient consult to Hospital Medicine  Once        Provider:  (Not yet assigned)    Completed SASKIA PEREZ     Inpatient consult to General surgery  Once        Provider:  MD Deana Bowie IAN            Significant Diagnostic Studies: N/A  As above    Pending Diagnostic Studies:       Procedure Component Value Units Date/Time    COVID-19 Rapid Screening [[id number]]     Order Status: Sent Lab Status: No result     Specimen: Nasal Swab     EXTRA TUBES [[id number]] Collected: 05/15/23 0431    Order Status: Sent Lab Status: In process Updated: 05/15/23 0431    Specimen: Blood, Venous     Narrative:      The following orders were created for panel order EXTRA TUBES.  Procedure                               Abnormality         Status                     ---------                               -----------         ------                     Light Green Top Hold[[id number]]                             In process                   Please view results for these tests on the individual orders.    EXTRA TUBES  [[id number]] Collected: 05/12/23 0350    Order Status: Sent Lab Status: In process Updated: 05/12/23 0350    Specimen: Blood, Venous     Narrative:      The following orders were created for panel order EXTRA TUBES.  Procedure                               Abnormality         Status                     ---------                               -----------         ------                     Lavender Top Hold[[id number]]                                In process                   Please view results for these tests on the individual orders.    EXTRA TUBES [[id number]] Collected: 05/11/23 0723    Order Status: Sent Lab Status: In process Updated: 05/11/23 0723    Specimen: Blood, Venous     Narrative:      The following orders were created for panel order EXTRA TUBES.  Procedure                               Abnormality         Status                     ---------                               -----------         ------                     Light Blue Top Hold[[id number]]                              In process                 Light Green Top Hold[[id number]]                             In process                   Please view results for these tests on the individual orders.    EXTRA TUBES [[id number]]     Order Status: Sent Lab Status: No result     Specimen: Blood, Venous     Narrative:      The following orders were created for panel order EXTRA TUBES.  Procedure                               Abnormality         Status                     ---------                               -----------         ------                     Light Blue Top Hold[[id number]]                                                         Light Green Top Hold[[id number]]                                                          Please view results for these tests on the individual orders.    EXTRA TUBES [[id number]] Collected: 05/10/23 1455    Order Status: Sent Lab Status: In process Updated: 05/10/23 1455    Specimen: Blood, Venous     Narrative:       The following orders were created for panel order EXTRA TUBES.  Procedure                               Abnormality         Status                     ---------                               -----------         ------                     Light Blue Top Hold[[id number]]                              In process                 Red Top Hold[[id number]]                                     In process                 Red Top Hold[[id number]]                                     In process                 Gold Top Hold[[id number]]                                    In process                 Pink Top Hold[[id number]]                                    In process                   Please view results for these tests on the individual orders.    Light Blue Top Hold [[id number]]     Order Status: Sent Lab Status: No result     Specimen: Blood, Venous     Light Green Top Hold [[id number]]     Order Status: Sent Lab Status: No result     Specimen: Blood, Venous           Final Active Diagnoses:      Problems Resolved During this Admission:    Diagnosis Date Noted Date Resolved POA    PRINCIPAL PROBLEM:  Yesi's gangrene [N49.3] 05/16/2023 05/16/2023 Unknown    Sepsis [A41.9] 05/16/2023 05/16/2023 Unknown      Discharged Condition: stable    Disposition: Long Term Acute Care    Follow Up:    Patient Instructions:      Ambulatory referral/consult to Urology   Standing Status: Future   Referral Priority: Routine Referral Type: Consultation   Referral Reason: Specialty Services Required   Requested Specialty: Urology   Number of Visits Requested: 1     Ambulatory referral/consult to Diabetes Education   Standing Status: Future   Referral Priority: Routine Referral Type: Consultation   Referral Reason: Specialty Services Required   Requested Specialty: Diabetes   Number of Visits Requested: 1 Expiration Date: 05/16/24     Ambulatory referral/consult to Wound Clinic   Standing Status: Future   Referral Priority: Routine Referral Type:  Consultation   Referral Reason: Specialty Services Required   Requested Specialty: Wound Care   Number of Visits Requested: 1     Ambulatory referral/consult to Smoking Cessation Program   Standing Status: Future   Referral Priority: Routine Referral Type: Consultation   Referral Reason: Specialty Services Required   Requested Specialty: CTTS   Number of Visits Requested: 1     Diet diabetic     Reason for not Prescribing Nicotine Replacement     Order Specific Question Answer Comments   Reason for not Prescribing: Not medically appropriate at this time      Medications:  Reconciled Home Medications:      Medication List        START taking these medications      enoxaparin 40 mg/0.4 mL Syrg  Commonly known as: LOVENOX  Inject 0.4 mLs (40 mg total) into the skin once daily.     fluconazole 200 MG Tab  Commonly known as: DIFLUCAN  Take 2 tablets (400 mg total) by mouth once daily.  Start taking on: May 17, 2023     folic acid 1 MG tablet  Commonly known as: FOLVITE  Take 1 tablet (1 mg total) by mouth once daily.  Start taking on: May 17, 2023     insulin aspart U-100 100 unit/mL injection  Commonly known as: NovoLOG  Inject 0-15 Units into the skin before meals and at bedtime as needed for High Blood Sugar.     * insulin detemir U-100 100 unit/mL injection  Commonly known as: Levemir  Inject 60 Units into the skin every evening.     * insulin detemir U-100 100 unit/mL injection  Commonly known as: Levemir  Inject 60 Units into the skin once daily.  Start taking on: May 17, 2023     sulfamethoxazole-trimethoprim 800-160mg 800-160 mg Tab  Commonly known as: BACTRIM DS  Take 1 tablet by mouth 2 (two) times daily.     tamsulosin 0.4 mg Cap  Commonly known as: FLOMAX  Take 1 capsule (0.4 mg total) by mouth 2 (two) times a day.     thiamine 100 MG tablet  Take 1 tablet (100 mg total) by mouth once daily.  Start taking on: May 17, 2023           * This list has 2 medication(s) that are the same as other medications  prescribed for you. Read the directions carefully, and ask your doctor or other care provider to review them with you.                CONTINUE taking these medications      albuterol 90 mcg/actuation inhaler  Commonly known as: PROVENTIL/VENTOLIN HFA  Inhale 2 puffs into the lungs as needed for Wheezing or Shortness of Breath.     ammonium lactate 12 % lotion  Commonly known as: LAC-HYDRIN  Apply topically as needed for Dry Skin. Apply to feet exception between toes, thin then of Vaseline.     aspirin 81 MG Chew  Take 81 mg by mouth once daily.     atorvastatin 80 MG tablet  Commonly known as: LIPITOR  Take 1 tablet (80 mg total) by mouth once daily.     clopidogreL 75 mg tablet  Commonly known as: PLAVIX  Take 1 tablet (75 mg total) by mouth once daily.     cromolyn 4 % ophthalmic solution  Commonly known as: OPTICROM  1 drop.     EASY TOUCH TWIST LANCETS 33 gauge Misc  Generic drug: lancets  USE ONE TWICE DAILY     famotidine 40 MG tablet  Commonly known as: PEPCID  Take 1 tablet (40 mg total) by mouth 2 (two) times daily.     fluticasone propionate 50 mcg/actuation nasal spray  Commonly known as: FLONASE  1 spray by Each Nostril route as needed.     fluticasone-salmeterol 230-21 mcg/dose 230-21 mcg/actuation Hfaa inhaler  Commonly known as: ADVAIR HFA  Inhale 1 puff into the lungs 2 (two) times a day. Controller     glipiZIDE 10 MG tablet  Commonly known as: GLUCOTROL  Take 10 mg by mouth 2 (two) times daily.     lisinopriL 10 MG tablet  Take 1 tablet (10 mg total) by mouth once daily.     metFORMIN 1000 MG tablet  Commonly known as: GLUCOPHAGE  Take 1 tablet (1,000 mg total) by mouth 2 (two) times daily with meals.     metoprolol tartrate 50 MG tablet  Commonly known as: LOPRESSOR  Take 1 tablet (50 mg total) by mouth 2 (two) times daily.     omeprazole 40 MG capsule  Commonly known as: PRILOSEC  Take 1 capsule (40 mg total) by mouth once daily.     TRULICITY 3 mg/0.5 mL pen injector  Generic drug:  dulaglutide  SMARTSIG:3 Milligram(s) SUB-Q Once a Week            STOP taking these medications      INVOKANA 300 mg Tab tablet  Generic drug: canagliflozin     pioglitazone 30 MG tablet  Commonly known as: ACTOS     TRUE METRIX GLUCOSE METER Misc  Generic drug: blood-glucose meter              César Al MD  General Surgery  [facility] - [address] [place] [address]

## 2023-05-16 NOTE — PT/OT/SLP PROGRESS
Physical Therapy    Missed Treatment Session    Patient Name:  John Addison   MRN:  [id number]      Patient not seen at this time secondary to Other (Comment). Pt's RN notes that pt is being D/C from hospital.    Will follow-up as patient is available to participate and as therapists' schedule allows.

## 2023-05-16 NOTE — CONSULTS
Round made at bedside on 5/15/23. Reviewed previous medical records and notes. Recommendation for wound care cleanse wound with full strength Dakin's wet to dry daily and prn soilage. Do not recommend wound vac at this time due area of wound and will be difficult to keep a seal. Schedule a follow up with wound care prior to discharge.

## 2023-05-16 NOTE — MEDICAL/APP STUDENT
General Surgery  Daily Progress Note     HD#6  POD#6 Days Post-Op    SUBJECTIVE / INTERVAL EVENTS  68 year old male post op day 6 for debridement of necrotic perineal and scrotal tissue concerning for yasmin's gangrene; patient reports sleeping well and having no issues overnight  NAEON AF VSS  Pain well controlled  Tolerating diet with no nausea or vomiting  Passing gas, last BM    OBJECTIVE    Vitals  Temp:  [98.3 °F (36.8 °C)-98.7 °F (37.1 °C)] 98.4 °F (36.9 °C)  Pulse:  [82-96] 84  Resp:  [18-20] 18  SpO2:  [96 %-99 %] 96 %  BP: (129-155)/(77-92) 153/83    Intake / Output  PO: 100 cc  UO: 200 cc  Last Bowel Movement: 05/14/23  Drain Output:     Physical Exam:  GEN: comfortable, no acute distress  RESP: breathing comfortably on room air  ABD: soft, nondistended, nontender  Groin: appropriate mild tenderness in region of wound, dressing is dry and intact, and without discharge or significant blood or leakage.       Labs    Lab Results   Component Value Date    WBC 12.40 (H) 05/15/2023    HGB 11.8 (L) 05/15/2023    HCT 34.6 (L) 05/15/2023    MCV 77.8 (L) 05/15/2023     05/15/2023           Recent Labs     05/14/23  0349 05/15/23  0431    136   K 4.4 4.2   CO2 22* 23   BUN 17.4 16.3   CREATININE 0.84 0.79   CALCIUM 9.4 9.2   MG 1.90  --    PHOS 2.1*  --    ALBUMIN 1.8* 1.8*   BILITOT 0.3 0.4   AST 25 28   ALKPHOS 98 105   ALT 28 27        Micro  Microbiology Results (last 7 days)       Procedure Component Value Units Date/Time    Blood culture #1 **CANNOT BE ORDERED STAT** [[id number]]  (Normal) Collected: 05/10/23 1448    Order Status: Completed Specimen: Blood from Arm, Right Updated: 05/15/23 2000     CULTURE, BLOOD ([facility]) No Growth at 5 days    Blood culture #2 **CANNOT BE ORDERED STAT** [[id number]]  (Normal) Collected: 05/10/23 1448    Order Status: Completed Specimen: Blood from Hand, Right Updated: 05/15/23 2000     CULTURE, BLOOD ([facility]) No Growth at 5 days    Anaerobic Culture [[id number]]   (Abnormal) Collected: 05/10/23 1609    Order Status: Completed Specimen: Tissue from Scrotum Updated: 05/15/23 1236     Anaerobe Culture PREVOTELLA SPECIES      Bacteroides ovatus group     Comment: Anaerobic sensitivity is not usually indicated except on single isolates from sterile body sites.       Tissue Culture - Aerobic [[id number]]  (Abnormal)  (Susceptibility) Collected: 05/10/23 1609    Order Status: Completed Specimen: Tissue from Scrotum Updated: 05/14/23 1215     CULTURE, TISSUE- AEROBIC ([facility]) Few Enterococcus faecalis     Comment: Ampicillin results may be used to predict susceptibility to amoxicillin-clavulanate, ampicillin-sulbactam, and piperacillin-tazobactam.       Urine culture [[id number]] Collected: 05/10/23 2342    Order Status: Completed Specimen: Urine Updated: 05/13/23 0923     Urine Culture No Growth    Gram Stain [[id number]] Collected: 05/10/23 1609    Order Status: Completed Specimen: Tissue from Scrotum Updated: 05/11/23 1000     GRAM STAIN Few WBC observed      Many Gram Positive Rods      Many Gram Negative Rods      Few Gram positive cocci    Blood culture x two cultures. Draw prior to antibiotics. [[id number]] Collected: 05/10/23 1440    Order Status: Canceled Specimen: Blood from Arm, Right     Blood culture x two cultures. Draw prior to antibiotics. [[id number]]     Order Status: Canceled Specimen: Blood              Imaging      ASSESSMENT & PLAN  68 year old male at post op day 6 s/p debridement is feeling well and improving symptoms and no residual necrotic tissue visible on last wound change     -Continue bactrim, fluconazole  -Continue daily dressing changes  -Wound care to evaluate for wound vac today  -Internal medicine managing diabetes medications, appreciate assistance - consider endocrine consult per IM recs  -IS, pulmonary toilet  -PT/OT, encouraged OOB and ambulation  -CM working on SNF placement  -Lovenox for DVT ppx     Lloyd [id number], MS3    5/16/2023  5:56 AM

## 2023-05-16 NOTE — MEDICAL/APP STUDENT
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 Will re-evaluate tomorrow    OBJECTIVE    Vitals  Temp:  [98.3 °F (36.8 °C)-98.7 °F (37.1 °C)] 98.4 °F (36.9 °C)  Pulse:  [82-96] 86  Resp:  [16-20] 16  SpO2:  [96 %-99 %] 96 %  BP: (129-155)/(77-92) 153/83    Intake / Output  PO: 100 cc  UO: 200 cc  Last Bowel Movement: 05/14/23     Physical Exam:  GEN: comfortable, no acute distress  RESP: breathing comfortably on room air  ABD: soft, nondistended, nontender  Groin: appropriate mild tenderness in region of wound, dressing is dry and intact, and without discharge or significant blood or leakage.       Labs    Lab Results   Component Value Date    WBC 12.40 (H) 05/15/2023    HGB 11.8 (L) 05/15/2023    HCT 34.6 (L) 05/15/2023    MCV 77.8 (L) 05/15/2023     05/15/2023     Recent Labs     05/14/23  0349 05/15/23  0431    136   K 4.4 4.2   CO2 22* 23   BUN 17.4 16.3   CREATININE 0.84 0.79   CALCIUM 9.4 9.2   MG 1.90  --    PHOS 2.1*  --    ALBUMIN 1.8* 1.8*   BILITOT 0.3 0.4   AST 25 28   ALKPHOS 98 105   ALT 28 27        Micro  Microbiology Results (last 7 days)       Procedure Component Value Units Date/Time    Blood culture #1 **CANNOT BE ORDERED STAT** [[id number]]  (Normal) Collected: 05/10/23 5781    Order Status: Completed Specimen: Blood from Arm, Right Updated: 05/15/23 2000     CULTURE, BLOOD ([facility]) No Growth at 5 days    Blood culture #2 **CANNOT BE ORDERED STAT** [[id number]]  (Normal) Collected: 05/10/23 1448    Order Status: Completed Specimen: Blood from  Hand, Right Updated: 05/15/23 2000     CULTURE, BLOOD ([facility]) No Growth at 5 days    Anaerobic Culture [[id number]]  (Abnormal) Collected: 05/10/23 1609    Order Status: Completed Specimen: Tissue from Scrotum Updated: 05/15/23 1236     Anaerobe Culture PREVOTELLA SPECIES      Bacteroides ovatus group     Comment: Anaerobic sensitivity is not usually indicated except on single isolates from sterile body sites.       Tissue Culture - Aerobic [[id number]]  (Abnormal)  (Susceptibility) Collected: 05/10/23 1609    Order Status: Completed Specimen: Tissue from Scrotum Updated: 05/14/23 1215     CULTURE, TISSUE- AEROBIC ([facility]) Few Enterococcus faecalis     Comment: Ampicillin results may be used to predict susceptibility to amoxicillin-clavulanate, ampicillin-sulbactam, and piperacillin-tazobactam.       Urine culture [[id number]] Collected: 05/10/23 2342    Order Status: Completed Specimen: Urine Updated: 05/13/23 0923     Urine Culture No Growth    Gram Stain [[id number]] Collected: 05/10/23 1609    Order Status: Completed Specimen: Tissue from Scrotum Updated: 05/11/23 1000     GRAM STAIN Few WBC observed      Many Gram Positive Rods      Many Gram Negative Rods      Few Gram positive cocci    Blood culture x two cultures. Draw prior to antibiotics. [[id number]] Collected: 05/10/23 1440    Order Status: Canceled Specimen: Blood from Arm, Right     Blood culture x two cultures. Draw prior to antibiotics. [[id number]]     Order Status: Canceled Specimen: Blood             ASSESSMENT & PLAN  68 year old male at post op day 6 s/p debridement is feeling well and improving symptoms and no residual necrotic tissue visible on last wound change.     -Continue bactrim, fluconazole  -Continue daily dressing changes  -Internal medicine managing diabetes medications, appreciate assistance - consider endocrine consult per IM recs  -IS, pulmonary toilet  -PT/OT, encouraged OOB and ambulation  -CM working on SNF placement  -Lovenox for DVT  ppx     Lloyd Everett, MS3  5/16/2023  5:56 AM     PGY1 Attestation   I have seen and examined the patient with the medical student above. I agree with the above documentation and the note was edited as necessary.    César Al MD  [facility] General Surgery

## 2023-05-16 NOTE — PRE ADMISSION SCREENING
[facility]    Pre-Admission Patient Screening                    Pre-Screen type:  LTAC    Facility Status: Accept     Referring Physician:  Smith Lay MD    Admitting Physician:  Smith Lay MD    Primary Care Physician:  Cammy Christianson NP    History         Patient Active Problem List    Diagnosis Date Noted    Xerosis of skin 10/10/2022    Dystrophic nail 10/10/2022    Onychomycosis of multiple toenails with type 2 diabetes mellitus 10/10/2022    Avulsion of toenail of left foot 10/10/2022    Abdominal swelling 08/22/2022    Abnormal findings on diagnostic imaging of lung 08/22/2022    Abnormal liver function tests 08/22/2022    Anemia 08/22/2022    Chronic allergic conjunctivitis 08/22/2022    Chronic obstructive pulmonary disease 08/22/2022    Current drinker of alcohol 08/22/2022    Intra-abdominal lymphadenopathy 08/22/2022    Tegmen defect of base of skull 08/22/2022    Cigarette nicotine dependence with nicotine-induced disorder 08/22/2022    Atypical chest pain 08/22/2022    Uncontrolled type 2 diabetes mellitus with nephropathy 06/24/2022    Type 2 diabetes mellitus with diabetic neuropathy, without long-term current use of insulin 06/13/2022    Coronary artery disease 06/13/2022    Tobacco abuse 06/13/2022    Hypertension 06/13/2022    Hyperlipidemia LDL goal <70 06/13/2022    Pain in both lower extremities 06/13/2022         Previous Specialties/Consulted physicians:      General Surgery  and Other: INTERNAL MEDICINE       Past and Current Medical History    Past Medical History:   Diagnosis Date    Coronary artery disease     Diabetes mellitus     Hypertension            History of Present Illness     SUBJECTIVE / INTERVAL EVENTS  68 year old male post op day 6 for debridement of necrotic perineal and 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 Will re-evaluate tomorrow     ASSESSMENT & PLAN  68 year old male at post op day 6 s/p debridement is feeling well and improving symptoms and no residual necrotic tissue visible on last wound change.     -Continue bactrim, fluconazole  -Continue daily dressing changes  -Internal medicine managing diabetes medications, appreciate assistance - consider endocrine consult per IM recs  -IS, pulmonary toilet  -PT/OT, encouraged OOB and ambulation  -CM working on SNF placement  -Lovenox for DVT ppx      [facility] Internal Medicine Progress Note Team 2   Brief HPI:  Mr. 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 IM Consulted by General Surgery for CBG optimization and discharge med recommendations.      Interval History: NAEON. AF. VSS.  This morning he denies fevers, headache, chest pain, SOB, N/V/D and abdominal pain.   Assessment & Plan:   DM-II  Yesi's Gangrene   HTN   CAD  HLD   Tobacco Use   ETOH Abuse       -Continue 60 U BID and high dose SSI.  Will monitor CBGs throughout the day.      -Hold all other home DM-II meds. Pt shouldn't take Actos, discontinued at last Endocrine visit. Pt shouldn't take Invokana or any other SGLT2  given hx of severe  infection, discontinue on discharge.      -qACHS CBGs or q6 hours if NPO.     -Contact if pt will be NPO for surgical intervention, as will need down titration of Insulin.     -Pt with upcoming Endocrine appt, if discharged in time and able to transport from facility. May be helpful for additional oupt control. Pt most recent A1c which is improved from previous of approx 12. However, pt with inconsistent use of Insulin and poor diet/lifestyle, which likely hampers consistent control. Consider Endocrine consult while inpt per  staff.      -Added home Statin, 80 mg qHS.      -Placed on CIWA protocol w/ Thiamine, MVI, and Folic Acid and q4 Ativan 1 mg  for CIWA > 8 or sig signs of withdrawal.      -Rest of care and medication management per primary team.      CODE STATUS: Full Code  Access: PIV   Antimicrobials: Fluconazole 400 mg qd, Bactrim 1 tab BID  Diet: Diabetic Diet   DVT Prophylaxis: Lovenox 40 mg qd   GI Prophylaxis: None   Fluids: PO intake      Disposition: Will up titrate Insulin and give home med recs prior to discharge. May need to consult Endocrine as well pending course. Rest of care per primary team.       Cosigned by: Ernie Chavis MD at 5/16/2023  8:24 AM     LTACH Admission Criteria:    Complex wound care for infected/necrotic skin conditions, Stage III or IV pressure injury, surgical wounds, or burns requiring at least one of the following:  Complex dressing changes at least 2 times every 24 hours  Dressing changes requiring IM/IV analgesics  Wound I&D  Wound Management requiring 24 hour observation and positioning at least every 2 hours by licensed personnel    Must meet at least one of the following concomitant treatments/intervention daily unless notes: (excludes PO meds unless notes)  IV medication per therapeutic regimen, Cardiac Monitoring, IV fludis greater than 50 cc/hr, Laboratory assessment and medication adjustment(s), and Rehab Therapy (PT/OT/ST) 1-3 hours a day greater than or equal to 5 days a week      LTACH more appropriate than other levels of care (eg, skilled nursing facility, home health care), as indicated by:    Clinical management of patient deemed too frequent and needed beyond the capabilities of alternative levels of care as evidence by: Continued use of IV analgesics, Blood glucose monitoring greater than or equal to 4 times daily requiring clinical intervention, and Frequency of IV medications greater than or equal to 2 times daily  Frequent diagnostic services needed on an inpatient  basis, including clinical assessments, laboratory, and imaging as evidence by: Frequent monitoring and clinical assessments performed by a licensed RN to identify current and future patient needs by incorporating the recognition of normal vs abnormal body physiology, and to prompt recognition of pertinent changes to identify and prioritize appropriate interventions that can be performed within the acute inpatient setting.  and Frequent laboratory testing and/or imaging to aide in the improvement and effectiveness of patient's individualized treatment plan.   More intensive services, such as speciality nursing care, and/or onsite physician assessments needed that are not available at a lower level of care as evidence by: Daily physician intervention , Collaboration between consulting and attending providers still deemed a necessity to aide in the improvement and effectiveness of patient's individualized treatment plan, which can be provided at an [facility] level of care. , and Therapy Services to be included in patient's treatment plan in an effort to restore/improve patient's modality status to a safe level of functioning prior to acute illness.      [facility] as indicated by ALL of the following:      Hypotension Absent     Cardiovascular status stable     Stable chest findings     Renal function accepctable   Pain adequately managed    Intake acceptable       No acute significant hepatic dysfunction (eg, new encephalopathy)   No acute severe unstable neurologic abnormalities (eg, Altered mental status that is severe or persistent, or evidence of ongoing CNS embolization or ischemia, worsening hydrocephalus)   No active bleeding or unstable disorders of hemostasis (eg, no recent need for transfusion, severe thrombocytopenia with bleeding)   No need for respiratory or other isolation, OR manageable at LTACH level of care    Long-term enteral feeding (eg, PEG) and intravenous access  established, not needed, OR to be placed at LTACH level of care      Anticipated Discharge Disposition:    Home with caregiver support       Patient Traveled outside of the [place] in the last 3 months? no     Patient discharged from this LTAC to SNF within the last 45 days? no    Patient discharged from this LTAC to Rehab within the last 27 days? no    Prior residence: home    Prior Post-Acute Services: N/A     Allergies: Review of patient's allergies indicates:  No Known Allergies    Has patient received the current influenza vaccine (Oct 1 - March 31)? Unknown     Has patient received PPD skin test prior to admit? No    Code Status: Full Code    Orientation: Time, Place, Person, and Events    Speech: normal     Vital Signs:     Date     Blood Pressure     Pulse     Respiratory Rate     O2 Saturation     Temperature         Bowel/Bladder: continent of bladder and continent of bowel    Dialysis: N/A         Peripheral IV - Single Lumen 05/10/23 1350 20 G Right Hand (Active)   Site Assessment Clean;Dry;Intact;No redness;No swelling 05/16/23 0729   Extremity Assessment Distal to IV No abnormal discoloration;No redness;No swelling;No warmth 05/16/23 0729   Line Status Saline locked 05/16/23 0729   Dressing Status Clean;Dry;Intact 05/16/23 0729   Dressing Intervention Integrity maintained 05/16/23 0729   Number of days: 5            Peripheral IV - Single Lumen 05/10/23 1445 18 G Left Antecubital (Active)   Site Assessment Clean;Dry;Intact;No redness;No swelling 05/16/23 0729   Extremity Assessment Distal to IV No redness;No swelling;No warmth;No abnormal discoloration 05/16/23 0729   Line Status Saline locked 05/16/23 0729   Dressing Status Clean;Dry;Intact 05/16/23 0729   Dressing Intervention Integrity maintained 05/16/23 0729   Number of days: 5       CBGs/Accuchecks: Yes     Precautions: Fall    Restraints: No     Isolation Precautions: N/A       Facility-Administered Medications as of 5/16/2023   Medication Dose  Route Frequency Provider Last Rate Last Admin    0.9%  NaCl infusion (for blood administration)   Intravenous Q24H PRN Sarah Rosas MD        acetaminophen 160 mg/5 mL (5 mL) liquid (ADULTS) 649.6 mg  649.6 mg Oral Q6H PRN Sarah Rosas MD        acetaminophen tablet 1,000 mg  1,000 mg Oral Q8H PRN Sarah Rosas MD   1,000 mg at 23 0901    albuterol inhaler 2 puff  2 puff Inhalation PRN Sarah Rosas MD   2 puff at 23 0740    aspirin chewable tablet 81 mg  81 mg Oral Daily Sarah Rosas MD   81 mg at 23 0845    atorvastatin tablet 80 mg  80 mg Oral QHS Cindi Ortiz MD   80 mg at 05/15/23 2027    clopidogreL tablet 75 mg  75 mg Oral Daily Sarah Rosas MD   75 mg at 23 0845    dextrose 10% bolus 125 mL 125 mL  12.5 g Intravenous PRN Sarah Rosas MD        dextrose 10% bolus 250 mL 250 mL  25 g Intravenous PRN Sarah Rosas MD        enoxaparin injection 40 mg  40 mg Subcutaneous Daily César Al MD   40 mg at 05/15/23 1636    [COMPLETED] famotidine (PF) injection 20 mg  20 mg Intravenous Once Nazia Rosario MD   20 mg at 05/10/23 1530    [] fentaNYL injection 100 mcg  100 mcg Intravenous ED 1 Time Manoj Jean DO        fluconazole tablet 400 mg  400 mg Oral Daily Sarah Rosas MD   400 mg at 23 0901    fluticasone furoate-vilanteroL 200-25 mcg/dose diskus inhaler 1 puff  1 puff Inhalation Daily Sarah Rosas MD   1 puff at 23 0729    folic acid tablet 1 mg  1 mg Oral Daily Cindi Ortiz MD   1 mg at 23 0845    glucagon (human recombinant) injection 1 mg  1 mg Intramuscular PRN Sarah Rosas MD        [COMPLETED] HYDROmorphone injection 0.5 mg  0.5 mg Intravenous Once Smith Lay MD   0.5 mg at 23 1126    HYDROmorphone injection 1 mg  1 mg Intravenous Q2H PRN Sarah Rosas MD   1 mg at 05/15/23 1125    [COMPLETED]  HYDROmorphone injection 1 mg  1 mg Intravenous Once César Al MD   1 mg at 23 1059    [COMPLETED] insulin aspart U-100 (NovoLOG) 100 unit/mL injection        6 Units at 05/10/23 1721    [] insulin aspart U-100 (NovoLOG) 100 unit/mL injection             insulin aspart U-100 injection 0-15 Units  0-15 Units Subcutaneous QID (AC + HS) PRN Cindi Ortiz MD   6 Units at 23 0605    [COMPLETED] insulin detemir U-100 injection 10 Units  10 Units Subcutaneous Once Cindi Ortiz MD   10 Units at 23 1220    insulin detemir U-100 injection 60 Units  60 Units Subcutaneous QHS Cindi Ortiz MD   60 Units at 05/15/23 2028    insulin detemir U-100 injection 60 Units  60 Units Subcutaneous Daily Cindi Ortiz MD   60 Units at 23 0904    [COMPLETED] lactated ringers bolus 1,000 mL  1,000 mL Intravenous Once César Al MD   Stopped at 23 0730    [COMPLETED] lactated ringers bolus 3,537 mL  30 mL/kg Intravenous ED 1 Time Manoj Jean DO   Stopped at 05/10/23 1511    [COMPLETED] levalbuterol (XOPENEX) 1.25 mg/3 mL nebulizer solution        1.25 mg at 05/10/23 1559    [COMPLETED] levalbuterol (XOPENEX) 1.25 mg/3 mL nebulizer solution        1.25 mg at 05/10/23 1658    LIDOcaine (PF) 10 mg/ml (1%) injection 10 mg  1 mL Intradermal Once PRN Sarah Rosas MD        lisinopriL tablet 10 mg  10 mg Oral Daily Sarah Rosas MD   10 mg at 23 0845    LORazepam tablet 1 mg  1 mg Oral Q4H PRN Cindi Ortiz MD        melatonin tablet 6 mg  6 mg Oral Nightly PRN Sarah Rosas MD        metoprolol tartrate (LOPRESSOR) tablet 50 mg  50 mg Oral BID Sarah Rosas MD   50 mg at 23 0845    multivitamin liquid per dose 5 mL  5 mL Oral Daily Cindi Ortiz MD   5 mL at 23 0929    [COMPLETED] mupirocin 2 % ointment   Nasal BID Smith Lay MD   Given at 05/15/23 0900    ondansetron disintegrating tablet 8 mg  8 mg Oral Q8H PRN  Sarah Rosas MD        oxyCODONE immediate release tablet 5 mg  5 mg Oral Q6H PRN Sarah Rosas MD   5 mg at 05/15/23 1639    oxyCODONE immediate release tablet 5 mg  5 mg Oral Q6H PRN Sarah Rosas MD   5 mg at 05/13/23 2007    pantoprazole EC tablet 40 mg  40 mg Oral Daily Tyler Moran MD   40 mg at 05/16/23 0845    [COMPLETED] potassium, sodium phosphates 280-160-250 mg packet 2 packet  2 packet Oral BID Sarah Rosas MD   2 packet at 05/12/23 2021    sodium chloride 0.9% bolus 1,000 mL 1,000 mL  1,000 mL Intravenous Once Sarah Rosas MD        sodium chloride 0.9% flush 10 mL  10 mL Intravenous PRN Sarah Rosas MD        [COMPLETED] sodium citrate-citric acid 500-334 mg/5 ml solution 30 mL  30 mL Oral Once Nazia Rosario MD   30 mL at 05/10/23 1530    [COMPLETED] sodium phosphate 30 mmol in dextrose 5 % (D5W) 250 mL IVPB  30 mmol Intravenous Once César Al MD   Stopped at 05/12/23 1130    sulfamethoxazole-trimethoprim 800-160mg per tablet 1 tablet  1 tablet Oral BID Sarah Rosas MD   1 tablet at 05/16/23 0845    tamsulosin 24 hr capsule 0.4 mg  0.4 mg Oral BID Sarah Rosas MD   0.4 mg at 05/16/23 0845    thiamine tablet 100 mg  100 mg Oral Daily Cindi Ortiz MD   100 mg at 05/16/23 0845    [COMPLETED] vancomycin (VANCOCIN) 2,000 mg in sodium chloride 0.9% 500 mL IVPB  2,000 mg Intravenous Once Manoj Jean DO   2,000 mg at 05/10/23 1603     Outpatient Medications as of 5/16/2023   Medication Sig Dispense Refill    albuterol (PROVENTIL/VENTOLIN HFA) 90 mcg/actuation inhaler Inhale 2 puffs into the lungs as needed for Wheezing or Shortness of Breath. 18 g 11    ammonium lactate (LAC-HYDRIN) 12 % lotion Apply topically as needed for Dry Skin. Apply to feet exception between toes, thin then of Vaseline. 225 g 2    aspirin 81 MG Chew Take 81 mg by mouth once daily.      atorvastatin (LIPITOR) 80 MG tablet Take 1  "tablet (80 mg total) by mouth once daily. 90 tablet 3    clopidogreL (PLAVIX) 75 mg tablet Take 1 tablet (75 mg total) by mouth once daily. 90 tablet 3    cromolyn (OPTICROM) 4 % ophthalmic solution 1 drop.      EASY TOUCH TWIST LANCETS 33 gauge Misc USE ONE TWICE DAILY      famotidine (PEPCID) 40 MG tablet Take 1 tablet (40 mg total) by mouth 2 (two) times daily. 180 tablet 3    fluticasone propionate (FLONASE) 50 mcg/actuation nasal spray 1 spray by Each Nostril route as needed.      fluticasone-salmeterol 230-21 mcg/dose (ADVAIR HFA) 230-21 mcg/actuation HFAA inhaler Inhale 1 puff into the lungs 2 (two) times a day. Controller 12 g 11    glipiZIDE (GLUCOTROL) 10 MG tablet Take 10 mg by mouth 2 (two) times daily.      INVOKANA 300 mg Tab tablet Take 300 mg by mouth.      lisinopriL 10 MG tablet Take 1 tablet (10 mg total) by mouth once daily. 90 tablet 3    metFORMIN (GLUCOPHAGE) 1000 MG tablet Take 1 tablet (1,000 mg total) by mouth 2 (two) times daily with meals. 60 tablet 1    metoprolol tartrate (LOPRESSOR) 50 MG tablet Take 1 tablet (50 mg total) by mouth 2 (two) times daily. 180 tablet 3    omeprazole (PRILOSEC) 40 MG capsule Take 1 capsule (40 mg total) by mouth once daily. 30 capsule 0    pioglitazone (ACTOS) 30 MG tablet Take 30 mg by mouth.      TRUE METRIX GLUCOSE METER Misc USE ONE TWICE DAILY      TRULICITY 3 mg/0.5 mL pen injector SMARTSIG:3 Milligram(s) SUB-Q Once a Week          Cardiovascular:    Cardiovascular Review: Requires Review    Telemetry: Yes    Rhythm:  NSR    AICD: No      Respiratory:    Oxygen:  RA    CPT/Frequency: N/A    Incentive Spirometer/Frequency:  Q4HRS     CPAP/Settings: N/A    BiPAP/Settings: N/A    Oxygen Saturation:  96-99%    Suction Frequency: N/A      Vent Settings:   Mode:   Rate:   TV:   FIO2:   PEEP:   PS:   iTime:    Trial/HS Settings:   Mode:   Rate:   TV:   FIO2:   PEEP:   PS:       Nutrition:      Ht Readings from Last 3 Encounters:   05/10/23 5' 10" (1.778 m) " "  09/28/22 5' 10.8" (1.798 m)   08/22/22 5' 11" (1.803 m)     Wt Readings from Last 1 Encounters:   05/10/23 1337 117.9 kg (260 lb)       Feeding Status:   Current Diet: REGULAR DIABETIC DIET    Supplements:N/A   Tube Feeding: N/A   Flushes: N/A      Integumentary:    Integumentary: Other: SEE NOTES   Groin: appropriate mild tenderness in region of wound, dressing is dry and intact, and without discharge or significant blood or leakage.            Incision/Site 05/10/23 1653 Perineum (Active)   05/10/23 1653   Present Prior to Hospital Arrival?:    Side:    Location: Perineum   Orientation:    Incision Type:    Closure Method:    Additional Comments:    Removal Indication and Assessment:    Wound Outcome:    Removal Indications:    Wound Image   05/11/23 2100   Incision WDL WDL 05/16/23 0729   Dressing Appearance Intact;Dry 05/16/23 0729   Drainage Amount None 05/16/23 0729   Appearance Dressing in place, unable to visualize 05/16/23 0729   Care Cleansed with: 05/12/23 1911   Dressing Gauze, wet to moist;Gauze, wet to dry 05/12/23 1911   Packing packed with;strip gauze 05/11/23 1650   Packing Inserted  1 05/11/23 1650   Packing Removed 1 05/11/23 1650   Number of days: 6            Incision/Site 05/10/23 1653 Perineum (Active)   05/10/23 1653   Present Prior to Hospital Arrival?:    Side:    Location: Perineum   Orientation:    Incision Type:    Closure Method:    Additional Comments:    Removal Indication and Assessment:    Wound Outcome:    Removal Indications:    Appearance Dressing in place, unable to visualize 05/15/23 2030   Number of days: 6         Musculoskeletal:  Occupational Profile:  Living Environment: Pt reported he lives alone in a 2nd story apartment, 12 steps to enter with B rails, tub/shower combo.  Previous level of function: Pt stated he performed all self care skills on his own, meal prep, home making tasks on his own.  He stated his sister and daughter go grocery shopping for him.  Roles and " Routines: Pt doesn't drive or work.  Equipment Used at Home: none  Assistance upon Discharge: family care as needed     He presents with functional decline. Performance deficits affecting function: weakness, impaired self care skills, impaired functional mobility, pain    Bed Mobility:    Patient completed Supine to Sit with stand by assistance  Patient completed Sit to Supine with minimum assistance     Functional Mobility/Transfers:  Patient completed Sit <> Stand Transfer with contact guard assistance  with  rolling walker   Functional Mobility: Pt ambulated CGA with RW.     Activities of Daily Living:  Difficulty reaching distal BLE secondary to pain.    Transfer assist: Contact Guard Assistance    Weight Bearing Status: As Tolerated    Comments: N/A    ADL Assist: Moderate Assistance and Maximum Assistance    Special Equipment: walker    Radiology:    Radiology (Last 168 hours)           None             CV Ultrasound doppler arterial legs bilat  The right lower extremity demonstrated mild multilevel plaque and   multiphasic waveforms at all levels with the exception of the distal PTA.  The SANIA on the right correlates with a moderate obstruction.  The right lower extremity demonstrated moderate arterial flow reduction.    The left lower extremity demonstrated a greater than 70% stenosis at the   level of the proximal SFA with an associated loss of multiphasic   waveforms.  The SANIA on the left correlates with a moderately severe obstruction.  The left lower extremity demonstrated moderately severe arterial flow   reduction.      Lab/Cultures:    Blood Urea Nitrogen   Date Value Ref Range Status   05/15/2023 16.3 8.4 - 25.7 mg/dL Final   05/14/2023 17.4 8.4 - 25.7 mg/dL Final     Creatinine   Date Value Ref Range Status   05/15/2023 0.79 0.73 - 1.18 mg/dL Final   05/14/2023 0.84 0.73 - 1.18 mg/dL Final     WBC   Date Value Ref Range Status   05/15/2023 12.40 (H) 4.50 - 11.50 x10(3)/mcL Final   05/14/2023 12.50  (H) 4.50 - 11.50 x10(3)/mcL Final      CULTURE, BLOOD ([facility])   Date Value Ref Range Status   05/10/2023 No Growth at 5 days  Final   05/10/2023 No Growth at 5 days  Final     Urine Culture   Date Value Ref Range Status   05/10/2023 No Growth  Final     No results for input(s): PH, PCO2, PO2, HCO3, POCSATURATED, BE in the last 72 hours.

## 2023-05-16 NOTE — PROGRESS NOTES
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 Will re-evaluate tomorrow    OBJECTIVE    Vitals  Temp:  [98.3 °F (36.8 °C)-98.7 °F (37.1 °C)] 98.4 °F (36.9 °C)  Pulse:  [82-96] 86  Resp:  [16-20] 16  SpO2:  [96 %-99 %] 96 %  BP: (129-155)/(77-92) 153/83    Intake / Output  PO: 100 cc  UO: 200 cc  Last Bowel Movement: 05/14/23     Physical Exam:  GEN: comfortable, no acute distress  RESP: breathing comfortably on room air  ABD: soft, nondistended, nontender  Groin: appropriate mild tenderness in region of wound, dressing is dry and intact, and without discharge or significant blood or leakage.       Labs    Lab Results   Component Value Date    WBC 12.40 (H) 05/15/2023    HGB 11.8 (L) 05/15/2023    HCT 34.6 (L) 05/15/2023    MCV 77.8 (L) 05/15/2023     05/15/2023     Recent Labs     05/14/23  0349 05/15/23  0431    136   K 4.4 4.2   CO2 22* 23   BUN 17.4 16.3   CREATININE 0.84 0.79   CALCIUM 9.4 9.2   MG 1.90  --    PHOS 2.1*  --    ALBUMIN 1.8* 1.8*   BILITOT 0.3 0.4   AST 25 28   ALKPHOS 98 105   ALT 28 27        Micro  Microbiology Results (last 7 days)       Procedure Component Value Units Date/Time    Blood culture #1 **CANNOT BE ORDERED STAT** [[id number]]  (Normal) Collected: 05/10/23 2132    Order Status: Completed Specimen: Blood from Arm, Right Updated: 05/15/23 2000     CULTURE, BLOOD ([facility]) No Growth at 5 days    Blood culture #2 **CANNOT BE ORDERED STAT** [[id number]]  (Normal) Collected: 05/10/23 1448    Order Status: Completed Specimen: Blood from  Hand, Right Updated: 05/15/23 2000     CULTURE, BLOOD ([facility]) No Growth at 5 days    Anaerobic Culture [[id number]]  (Abnormal) Collected: 05/10/23 1609    Order Status: Completed Specimen: Tissue from Scrotum Updated: 05/15/23 1236     Anaerobe Culture PREVOTELLA SPECIES      Bacteroides ovatus group     Comment: Anaerobic sensitivity is not usually indicated except on single isolates from sterile body sites.       Tissue Culture - Aerobic [[id number]]  (Abnormal)  (Susceptibility) Collected: 05/10/23 1609    Order Status: Completed Specimen: Tissue from Scrotum Updated: 05/14/23 1215     CULTURE, TISSUE- AEROBIC ([facility]) Few Enterococcus faecalis     Comment: Ampicillin results may be used to predict susceptibility to amoxicillin-clavulanate, ampicillin-sulbactam, and piperacillin-tazobactam.       Urine culture [[id number]] Collected: 05/10/23 2342    Order Status: Completed Specimen: Urine Updated: 05/13/23 0923     Urine Culture No Growth    Gram Stain [[id number]] Collected: 05/10/23 1609    Order Status: Completed Specimen: Tissue from Scrotum Updated: 05/11/23 1000     GRAM STAIN Few WBC observed      Many Gram Positive Rods      Many Gram Negative Rods      Few Gram positive cocci    Blood culture x two cultures. Draw prior to antibiotics. [[id number]] Collected: 05/10/23 1440    Order Status: Canceled Specimen: Blood from Arm, Right     Blood culture x two cultures. Draw prior to antibiotics. [[id number]]     Order Status: Canceled Specimen: Blood             ASSESSMENT & PLAN  68 year old male at post op day 6 s/p debridement is feeling well and improving symptoms and no residual necrotic tissue visible on last wound change.     -Continue bactrim, fluconazole  -Continue daily dressing changes  -Internal medicine managing diabetes medications, appreciate assistance - consider endocrine consult per IM recs  -IS, pulmonary toilet  -PT/OT, encouraged OOB and ambulation  -CM working on SNF placement  -Lovenox for DVT  ppx     Lloyd Everett, MS3  5/16/2023  5:56 AM     PGY1 Attestation   I have seen and examined the patient with the medical student above. I agree with the above documentation and the note was edited as necessary.    César Al MD  [facility] General Surgery

## 2023-05-16 NOTE — PLAN OF CARE
05/16/23 1306   Medicare Message   Important Message from Medicare regarding Discharge Appeal Rights Given to patient/caregiver;Explained to patient/caregiver;Signed/date by patient/caregiver   Date IMM was signed 05/16/23   Time IMM was signed 130

## 2023-05-16 NOTE — PROGRESS NOTES
05/16/23 1602   Missed Time Reason   Missed Treatment Reason Other (Comment)  (Orders for pt d/c today.)

## 2023-05-16 NOTE — PROGRESS NOTES
U Internal Medicine Progress Note Team 2     Subjective:   Brief HPI:  Mr. 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 IM Consulted by General Surgery for CBG optimization and discharge med recommendations.     Interval History: NAEON. AF. VSS.  This morning he denies fevers, headache, chest pain, SOB, N/V/D and abdominal pain.     ROS:  As per HPI    Objective:   Vitals:  Temp: 98.4 °F (36.9 °C) (05/16 0424)  Pulse: 84 (05/16 0424)  Resp: 18 (05/15 1933)  BP: 153/83 (05/16 0424)  SpO2: 96 % (05/16 0424)    Physical Examination:    Physical Exam  Constitutional:       Appearance: He is obese.   HENT:      Head: Normocephalic and atraumatic.      Nose: Nose normal.      Mouth/Throat:      Mouth: Mucous membranes are moist.      Pharynx: Oropharynx is clear.   Eyes:      Extraocular Movements: Extraocular movements intact.      Pupils: Pupils are equal, round, and reactive to light.   Cardiovascular:      Rate and Rhythm: Normal rate and regular rhythm.      Pulses: Normal pulses.   Pulmonary:      Effort: Pulmonary effort is normal.   Abdominal:      General: Abdomen is flat.      Palpations: Abdomen is soft.   Genitourinary:     Comments: Groin wound w/ dressing intact.   Musculoskeletal:      Right lower leg: No edema.      Left lower leg: No edema.   Skin:     General: Skin is warm and dry.      Capillary Refill: Capillary refill takes less than 2 seconds.   Neurological:      General: No focal deficit present.      Mental Status: He is alert.   Psychiatric:         Mood and Affect: Mood normal.         Laboratory:  CMP:  Recent Labs   Lab 05/13/23  0505 05/14/23  0349 05/15/23  0431    138 136   K 3.9 4.4 4.2   CHLORIDE 108* 108* 106   CO2 21* 22* 23   BUN 15.1 17.4 16.3   CREATININE 0.74 0.84 0.79   GLUCOSE 220* 253* 179*   CALCIUM 8.9 9.4 9.2   ALBUMIN 1.8* 1.8* 1.8*   BILITOT 0.6 0.3 0.4   AST 28 25 28   ALT 26 28 27    ALKPHOS 96 98 105       CBC:  Recent Labs   Lab 05/10/23  1440 05/10/23  2005 05/12/23  0350 05/13/23  0505 05/14/23  0349 05/15/23  0343   WBC 24.76*   < > 20.35* 12.84* 12.50* 12.40*   ABSNEUTRO 20.75*  --  15.94*  --   --   --    RBC 5.26   < > 4.40* 4.48* 4.59* 4.45*   HGB 14.1   < > 11.5* 11.8* 12.0* 11.8*   HCT 41.5*   < > 35.2* 35.0* 36.1* 34.6*      < > 250 256 315 359   MCV 78.9*   < > 80.0 78.1* 78.6* 77.8*   RDW 16.0   < > 16.1 15.9 16.3 16.1    < > = values in this interval not displayed.         Electrolytes:  Recent Labs   Lab 05/12/23  0320 05/13/23  0505 05/14/23  0349 05/15/23  0431    137 138 136   K 4.1 3.9 4.4 4.2   CHLORIDE 108* 108* 108* 106   CALCIUM 8.9 8.9 9.4 9.2   MG 2.00 2.00 1.90  --    PHOS 2.1* 1.9* 2.1*  --          24hr CBG:  Recent Labs   Lab 05/14/23  1949 05/15/23  0609 05/15/23  1118 05/15/23  1515 05/15/23  1934   POCTGLUCOSE 236* 190* 254* 307* 361*         Radiology:  No results found in the last 24 hours.     Assessment & Plan:   DM-II  Yesi's Gangrene   HTN   CAD  HLD   Tobacco Use   ETOH Abuse       -Continue 60 U BID and high dose SSI.  Will monitor CBGs throughout the day.     -Hold all other home DM-II meds. Pt shouldn't take Actos, discontinued at last Endocrine visit. Pt shouldn't take Invokana or any other SGLT2  given hx of severe  infection, discontinue on discharge.      -qACHS CBGs or q6 hours if NPO.     -Contact if pt will be NPO for surgical intervention, as will need down titration of Insulin.     -Pt with upcoming Endocrine appt, if discharged in time and able to transport from facility. May be helpful for additional oupt control. Pt most recent A1c which is improved from previous of approx 12. However, pt with inconsistent use of Insulin and poor diet/lifestyle, which likely hampers consistent control. Consider Endocrine consult while inpt per staff.      -Added home Statin, 80 mg qHS.      -Placed on CIWA protocol w/ Thiamine, MVI, and  Folic Acid and q4 Ativan 1 mg  for CIWA > 8 or sig signs of withdrawal.      -Rest of care and medication management per primary team.      CODE STATUS: Full Code  Access: PIV   Antimicrobials: Fluconazole 400 mg qd, Bactrim 1 tab BID  Diet: Diabetic Diet   DVT Prophylaxis: Lovenox 40 mg qd   GI Prophylaxis: None   Fluids: PO intake      Disposition: Will up titrate Insulin and give home med recs prior to discharge. May need to consult Endocrine as well pending course. Rest of care per primary team.

## 2023-05-17 NOTE — PT/OT/SLP DISCHARGE
Physical Therapy Discharge Summary    Name: John Addison  MRN: [id number]   Principal Problem: Yesi's gangrene         Recommendations - per last treatment session     Discharge Recommendations:  nursing facility, skilled   Discharge Equipment Recommendations: bath bench, walker, rolling     Assessment:     Refer to prior Physical Therapy note dated 05/15/23 for patient's most recent functional status, goal achievement and therapists' recommendations.    Hospital discharge anticipated today.    Objective     GOALS:  Multidisciplinary Problems       Physical Therapy Goals          Problem: Physical Therapy    Goal Priority Disciplines Outcome Goal Variances Interventions   Physical Therapy Goal     PT, PT/OT Adequate for Care Transition     Description: Goals to be met by: discharge     Patient will increase functional independence with mobility by performin. Supine to sit with Modified [place] (NOT MET)  2. Sit to supine with Modified [place] (NOT MET)  3. Sit to stand transfer with Modified [place] (NOT MET)  4. Gait  x 130 feet with Supervision using Rolling Walker. (NOT MET)  5. Ascend/descend 10 stair with bilateral Handrails Stand-by Assistance using No Assistive Device. (NOT MET)                         Plan     Patient discharged from acute PT Services on  .    Patient Discharged to: long term acute care facility per chart.    2023   - - -

## 2023-05-17 NOTE — PT/OT/SLP DISCHARGE
Occupational Therapy Discharge Summary    John Addison  MRN: [id number]   Principal Problem: Yesi's gangrene    Patient Active Problem List   Diagnosis    Type 2 diabetes mellitus with diabetic neuropathy, without long-term current use of insulin    Coronary artery disease    Tobacco abuse    Hypertension    Hyperlipidemia LDL goal <70    Pain in both lower extremities    Uncontrolled type 2 diabetes mellitus with nephropathy    Abdominal swelling    Abnormal findings on diagnostic imaging of lung    Abnormal liver function tests    Anemia    Chronic allergic conjunctivitis    Chronic obstructive pulmonary disease    Current drinker of alcohol    Intra-abdominal lymphadenopathy    Tegmen defect of base of skull    Cigarette nicotine dependence with nicotine-induced disorder    Atypical chest pain    Xerosis of skin    Dystrophic nail    Onychomycosis of multiple toenails with type 2 diabetes mellitus    Avulsion of toenail of left foot    Yesi's gangrene         Patient Discharged from acute Occupational Therapy.  Please refer to prior OT note for functional status.    Assessment:      Patient has not met goals.    Objective:     GOALS: 0/3 goals not met.  Multidisciplinary Problems       Occupational Therapy Goals          Problem: Occupational Therapy    Goal Priority Disciplines Outcome Interventions   Occupational Therapy Goal     OT, PT/OT Goals not met.    Description: Goals to be met by: d/c     Patient will increase functional independence with ADLs by performing:    LE Dressing with Modified [place].  Supine to sit with Modified [place].  Step transfer with Modified [place]                         Reasons for Discontinuation of Therapy Services  Transfer to alternate level of care.      Plan:     Patient Discharged to: Long Term Acute Care    5/17/2023

## 2023-05-18 NOTE — ANESTHESIA POSTPROCEDURE EVALUATION
Anesthesia Post Evaluation    Patient: John Addison    Procedure(s) Performed: Procedure(s) (LRB):  DEBRIDEMENT, WOUND (N/A)    Final Anesthesia Type: general      Patient location during evaluation: PACU  Patient participation: Yes- Able to Participate  Level of consciousness: awake and responds to stimulation  Post-procedure vital signs: reviewed and stable  Pain management: adequate  Airway patency: patent    PONV status at discharge: No PONV  Anesthetic complications: no      Cardiovascular status: blood pressure returned to baseline  Respiratory status: unassisted  Hydration status: euvolemic  Follow-up not needed.          Vitals Value Taken Time   /75 05/18/23 0759   Temp 36.6 °C (97.8 °F) 05/18/23 0759   Pulse 86 05/18/23 0759   Resp 18 05/18/23 1008   SpO2 96 % 05/18/23 0759         Event Time   Out of Recovery 18:58:00         Pain/Matthew Score: Pain Rating Prior to Med Admin: 6 (5/18/2023 10:08 AM)  Pain Rating Post Med Admin: 0 (5/18/2023 10:56 AM)

## 2023-05-19 ENCOUNTER — TELEPHONE (OUTPATIENT)
Dept: WOUND CARE | Facility: HOSPITAL | Age: 68
End: 2023-05-19
Payer: MEDICARE

## 2023-05-19 NOTE — TELEPHONE ENCOUNTER
Called [facility] where patient is currently admitted. Patient is receiving wound care at [facility] and does not need appointment for [facility] outpatient wound care. Will cancel patient appointment at [facility], can be referred/scheduled with us again if he is in need out outpatient wound care services.

## 2023-05-22 NOTE — TELEPHONE ENCOUNTER
Please let pt's daughter know that if patient is hospitalized/ at rehab facility this should be coordinated by  upon discharge.     Thank you

## 2023-05-23 PROBLEM — N49.3 FOURNIER'S GANGRENE OF SCROTUM: Status: ACTIVE | Noted: 2023-05-23

## 2023-05-23 PROBLEM — F17.200 SMOKING: Status: ACTIVE | Noted: 2023-05-23

## 2023-05-23 PROBLEM — E11.9 DIABETES MELLITUS: Status: ACTIVE | Noted: 2023-05-23

## 2023-05-25 ENCOUNTER — HOSPITAL ENCOUNTER (OUTPATIENT)
Facility: HOSPITAL | Age: 68
Discharge: NURSING, REHAB OR LONG-TERM CARE | End: 2023-05-25
Attending: SURGERY | Admitting: SURGERY
Payer: MEDICARE

## 2023-05-25 ENCOUNTER — ANESTHESIA EVENT (OUTPATIENT)
Dept: SURGERY | Facility: HOSPITAL | Age: 68
End: 2023-05-25
Payer: MEDICARE

## 2023-05-25 ENCOUNTER — ANESTHESIA (OUTPATIENT)
Dept: SURGERY | Facility: HOSPITAL | Age: 68
End: 2023-05-25
Payer: MEDICARE

## 2023-05-25 VITALS
HEART RATE: 123 BPM | OXYGEN SATURATION: 96 % | SYSTOLIC BLOOD PRESSURE: 97 MMHG | RESPIRATION RATE: 26 BRPM | DIASTOLIC BLOOD PRESSURE: 74 MMHG

## 2023-05-25 DIAGNOSIS — N49.3 FOURNIER'S GANGRENE: ICD-10-CM

## 2023-05-25 LAB — POCT GLUCOSE: 136 MG/DL (ref 70–110)

## 2023-05-25 PROCEDURE — 37000008 HC ANESTHESIA 1ST 15 MINUTES: Performed by: SURGERY

## 2023-05-25 PROCEDURE — 36000707: Performed by: SURGERY

## 2023-05-25 PROCEDURE — D9220A PRA ANESTHESIA: ICD-10-PCS | Mod: CRNA,,, | Performed by: NURSE ANESTHETIST, CERTIFIED REGISTERED

## 2023-05-25 PROCEDURE — 36000706: Performed by: SURGERY

## 2023-05-25 PROCEDURE — 87070 CULTURE OTHR SPECIMN AEROBIC: CPT | Performed by: SURGERY

## 2023-05-25 PROCEDURE — 63600175 PHARM REV CODE 636 W HCPCS: Performed by: NURSE ANESTHETIST, CERTIFIED REGISTERED

## 2023-05-25 PROCEDURE — 87205 SMEAR GRAM STAIN: CPT | Performed by: SURGERY

## 2023-05-25 PROCEDURE — D9220A PRA ANESTHESIA: Mod: CRNA,,, | Performed by: NURSE ANESTHETIST, CERTIFIED REGISTERED

## 2023-05-25 PROCEDURE — 25000003 PHARM REV CODE 250

## 2023-05-25 PROCEDURE — 37000009 HC ANESTHESIA EA ADD 15 MINS: Performed by: SURGERY

## 2023-05-25 PROCEDURE — D9220A PRA ANESTHESIA: Mod: ANES,,, | Performed by: ANESTHESIOLOGY

## 2023-05-25 PROCEDURE — 25000003 PHARM REV CODE 250: Performed by: NURSE ANESTHETIST, CERTIFIED REGISTERED

## 2023-05-25 PROCEDURE — 71000033 HC RECOVERY, INTIAL HOUR: Performed by: SURGERY

## 2023-05-25 PROCEDURE — D9220A PRA ANESTHESIA: ICD-10-PCS | Mod: ANES,,, | Performed by: ANESTHESIOLOGY

## 2023-05-25 PROCEDURE — 87075 CULTR BACTERIA EXCEPT BLOOD: CPT | Performed by: SURGERY

## 2023-05-25 RX ORDER — BUPIVACAINE HYDROCHLORIDE AND EPINEPHRINE 5; 5 MG/ML; UG/ML
INJECTION, SOLUTION EPIDURAL; INTRACAUDAL; PERINEURAL
Status: DISPENSED
Start: 2023-05-25 | End: 2023-05-26

## 2023-05-25 RX ORDER — PROPOFOL 10 MG/ML
VIAL (ML) INTRAVENOUS
Status: DISCONTINUED | OUTPATIENT
Start: 2023-05-25 | End: 2023-05-25

## 2023-05-25 RX ORDER — HYDROCODONE BITARTRATE AND ACETAMINOPHEN 5; 325 MG/1; MG/1
1 TABLET ORAL
Status: CANCELLED | OUTPATIENT
Start: 2023-05-25

## 2023-05-25 RX ORDER — SODIUM CHLORIDE, SODIUM LACTATE, POTASSIUM CHLORIDE, CALCIUM CHLORIDE 600; 310; 30; 20 MG/100ML; MG/100ML; MG/100ML; MG/100ML
INJECTION, SOLUTION INTRAVENOUS CONTINUOUS
Status: DISCONTINUED | OUTPATIENT
Start: 2023-05-25 | End: 2023-05-25 | Stop reason: HOSPADM

## 2023-05-25 RX ORDER — ACETAMINOPHEN 10 MG/ML
INJECTION, SOLUTION INTRAVENOUS
Status: DISCONTINUED | OUTPATIENT
Start: 2023-05-25 | End: 2023-05-25

## 2023-05-25 RX ORDER — ONDANSETRON 2 MG/ML
4 INJECTION INTRAMUSCULAR; INTRAVENOUS DAILY PRN
Status: DISCONTINUED | OUTPATIENT
Start: 2023-05-25 | End: 2023-05-25 | Stop reason: HOSPADM

## 2023-05-25 RX ORDER — ACETAMINOPHEN 10 MG/ML
INJECTION, SOLUTION INTRAVENOUS
Status: DISPENSED
Start: 2023-05-25 | End: 2023-05-26

## 2023-05-25 RX ORDER — SODIUM CHLORIDE, SODIUM GLUCONATE, SODIUM ACETATE, POTASSIUM CHLORIDE AND MAGNESIUM CHLORIDE 30; 37; 368; 526; 502 MG/100ML; MG/100ML; MG/100ML; MG/100ML; MG/100ML
INJECTION, SOLUTION INTRAVENOUS CONTINUOUS
Status: DISCONTINUED | OUTPATIENT
Start: 2023-05-25 | End: 2023-05-25 | Stop reason: HOSPADM

## 2023-05-25 RX ORDER — VASOPRESSIN 20 [USP'U]/ML
INJECTION, SOLUTION INTRAMUSCULAR; SUBCUTANEOUS
Status: DISCONTINUED | OUTPATIENT
Start: 2023-05-25 | End: 2023-05-25

## 2023-05-25 RX ORDER — MIDAZOLAM HYDROCHLORIDE 1 MG/ML
2 INJECTION INTRAMUSCULAR; INTRAVENOUS ONCE AS NEEDED
Status: DISCONTINUED | OUTPATIENT
Start: 2023-05-25 | End: 2023-05-25 | Stop reason: HOSPADM

## 2023-05-25 RX ORDER — FENTANYL CITRATE 50 UG/ML
INJECTION, SOLUTION INTRAMUSCULAR; INTRAVENOUS
Status: DISCONTINUED | OUTPATIENT
Start: 2023-05-25 | End: 2023-05-25

## 2023-05-25 RX ORDER — MEPERIDINE HYDROCHLORIDE 25 MG/ML
6.25 INJECTION INTRAMUSCULAR; INTRAVENOUS; SUBCUTANEOUS ONCE AS NEEDED
Status: DISCONTINUED | OUTPATIENT
Start: 2023-05-25 | End: 2023-05-25 | Stop reason: HOSPADM

## 2023-05-25 RX ORDER — KETAMINE HYDROCHLORIDE 100 MG/ML
INJECTION, SOLUTION INTRAMUSCULAR; INTRAVENOUS
Status: DISCONTINUED | OUTPATIENT
Start: 2023-05-25 | End: 2023-05-25

## 2023-05-25 RX ORDER — LIDOCAINE HYDROCHLORIDE 20 MG/ML
INJECTION INTRAVENOUS
Status: DISCONTINUED | OUTPATIENT
Start: 2023-05-25 | End: 2023-05-25

## 2023-05-25 RX ORDER — PROCHLORPERAZINE EDISYLATE 5 MG/ML
5 INJECTION INTRAMUSCULAR; INTRAVENOUS EVERY 30 MIN PRN
Status: DISCONTINUED | OUTPATIENT
Start: 2023-05-25 | End: 2023-05-25 | Stop reason: HOSPADM

## 2023-05-25 RX ORDER — ONDANSETRON 2 MG/ML
INJECTION INTRAMUSCULAR; INTRAVENOUS
Status: DISCONTINUED | OUTPATIENT
Start: 2023-05-25 | End: 2023-05-25

## 2023-05-25 RX ORDER — OXYCODONE HYDROCHLORIDE 5 MG/1
5 TABLET ORAL EVERY 4 HOURS PRN
Status: CANCELLED | OUTPATIENT
Start: 2023-05-25

## 2023-05-25 RX ORDER — HYDROMORPHONE HYDROCHLORIDE 2 MG/ML
0.4 INJECTION, SOLUTION INTRAMUSCULAR; INTRAVENOUS; SUBCUTANEOUS EVERY 5 MIN PRN
Status: DISCONTINUED | OUTPATIENT
Start: 2023-05-25 | End: 2023-05-25 | Stop reason: HOSPADM

## 2023-05-25 RX ORDER — PHENYLEPHRINE HCL IN 0.9% NACL 1 MG/10 ML
SYRINGE (ML) INTRAVENOUS
Status: DISCONTINUED | OUTPATIENT
Start: 2023-05-25 | End: 2023-05-25

## 2023-05-25 RX ORDER — MIDAZOLAM HYDROCHLORIDE 1 MG/ML
INJECTION INTRAMUSCULAR; INTRAVENOUS
Status: DISCONTINUED | OUTPATIENT
Start: 2023-05-25 | End: 2023-05-25

## 2023-05-25 RX ORDER — CELECOXIB 200 MG/1
400 CAPSULE ORAL
Status: DISCONTINUED | OUTPATIENT
Start: 2023-05-25 | End: 2023-05-25 | Stop reason: HOSPADM

## 2023-05-25 RX ADMIN — SODIUM CHLORIDE: 9 INJECTION, SOLUTION INTRAVENOUS at 10:05

## 2023-05-25 RX ADMIN — VASOPRESSIN 1 UNITS: 20 INJECTION INTRAVENOUS at 11:05

## 2023-05-25 RX ADMIN — Medication 100 MCG: at 11:05

## 2023-05-25 RX ADMIN — PROPOFOL 50 MG: 10 INJECTION, EMULSION INTRAVENOUS at 11:05

## 2023-05-25 RX ADMIN — Medication 100 MCG: at 10:05

## 2023-05-25 RX ADMIN — ONDANSETRON HYDROCHLORIDE 4 MG: 2 SOLUTION INTRAMUSCULAR; INTRAVENOUS at 11:05

## 2023-05-25 RX ADMIN — KETAMINE HYDROCHLORIDE 50 MG: 100 INJECTION, SOLUTION INTRAMUSCULAR; INTRAVENOUS at 11:05

## 2023-05-25 RX ADMIN — PROPOFOL 150 MG: 10 INJECTION, EMULSION INTRAVENOUS at 10:05

## 2023-05-25 RX ADMIN — ACETAMINOPHEN 1000 MG: 10 INJECTION, SOLUTION INTRAVENOUS at 11:05

## 2023-05-25 RX ADMIN — FENTANYL CITRATE 50 MCG: 50 INJECTION, SOLUTION INTRAMUSCULAR; INTRAVENOUS at 11:05

## 2023-05-25 RX ADMIN — LIDOCAINE HYDROCHLORIDE 50 MG: 20 INJECTION INTRAVENOUS at 10:05

## 2023-05-25 RX ADMIN — MIDAZOLAM 2 MG: 1 INJECTION INTRAMUSCULAR; INTRAVENOUS at 10:05

## 2023-05-25 RX ADMIN — FENTANYL CITRATE 50 MCG: 50 INJECTION, SOLUTION INTRAMUSCULAR; INTRAVENOUS at 10:05

## 2023-05-25 NOTE — ANESTHESIA PREPROCEDURE EVALUATION
"                                                                                                             05/25/2023  John Addison is a 68 y.o., male with ----------------------------  COPD (chronic obstructive pulmonary disease)  Coronary artery disease  Diabetes mellitus  hyperlipidemia  Hypertension    And ----------------------------  Coronary angioplasty  Tonsillectomy  Wound debridement      Comment:  Procedure: DEBRIDEMENT, WOUND;  Surgeon: Smith Lay MD;  Location: [facility];  Service: General;                 Laterality: N/A;  perineum debridement, lithotomy    Presents for I&D/lesion excision of LLE      "   Principal Problem: Yesi's gangrene      HPI:   68-year-old  male PMH significant for HTN, HLD, uncontrolled DM type 2, and CAD/mi s/p PCI, and nicotine dependence presented to [facility] ED on 05/10/2023 complaining of groin pain and drainage coming from his scrotum.  Workup significant for sepsis 2/2 yesi's gangrene.  Cefepime and clindamycin initiated.  Tolerated surgical debridement on 05/10 without perioperative complications.  Transitioned to oral Bactrim and fluconazole on 05/13.  Anaerobic culture significant for prevotella species and Bacteroides.  Tissue culture significant for Enterococcus faecalis. Tolerated transfer to [facility] inpatient LTAC on 5/17 without incident. He started having worsening inflammatory markers and leukocytosis and ID consulted for assistance in management.     -Stop Fluconazole  -Stop TMP/S<X  -Start Pip/Tazo   -Monitor wound as well as ESR/CRP and WBC  -If starts to improve and ready for discharge can transition to Amoxicillin/Clavulonic acid and Metronidazole to complete a total of 10 days from 05/22   .      Pre-op Assessment    I have reviewed the NPO Status.      Review of Systems      Physical Exam  General: Well nourished, Cooperative, Alert and Oriented  Pt nontoxic, pleasant, c/o " pain and hunger   Airway:  Mallampati: III   Mouth Opening: Normal  TM Distance: Normal  Tongue: Normal  Neck ROM: Normal ROM  Neck: Girth Increased    Dental:Top edentulous  Chest/Lungs:  Clear to auscultation, Normal Respiratory Rate    Heart:  Rate: Normal  Rhythm: Regular Rhythm        Anesthesia Plan  Type of Anesthesia, risks & benefits discussed:    Anesthesia Type: Gen ETT, Gen Supraglottic Airway  Intra-op Monitoring Plan: Standard ASA Monitors  Post Op Pain Control Plan: IV/PO Opioids PRN and multimodal analgesia  Induction:  IV  Airway Plan: Direct  Informed Consent: Informed consent signed with the Patient and all parties understand the risks and agree with anesthesia plan.  All questions answered. Patient consented to blood products? No  ASA Score: 3  Day of Surgery Review of History & Physical: H&P Update referred to the surgeon/provider.  Anesthesia Plan Notes: Premedication with Midazolam  Technique: GETA   PONV Prophylaxis   PACU Postop       Ready For Surgery From Anesthesia Perspective.     .

## 2023-05-26 LAB
GRAM STN SPEC: NORMAL
GRAM STN SPEC: NORMAL

## 2023-05-26 NOTE — ANESTHESIA PROCEDURE NOTES
Intubation    Date/Time: 5/25/2023 11:00 PM  Performed by: Bear Diaz CRNA  Authorized by: Lissette Jean MD     Intubation:     Induction:  Intravenous    Intubated:  Postinduction    Mask Ventilation:  Not attempted    Attempts:  1    Attempted By:  CRNA    Difficult Airway Encountered?: No      Complications:  None    Airway Device:  Supraglottic airway/LMA    Airway Device Size:  4.0    Style/Cuff Inflation:  Cuffed (inflated to minimal occlusive pressure)    Placement Verified By:  Capnometry    Complicating Factors:  None    Findings Post-Intubation:  BS equal bilateral and atraumatic/condition of teeth unchanged

## 2023-05-26 NOTE — OP NOTE
[facility] - Periop Services  Surgery Department  Operative Note    SUMMARY     Date of Procedure: 5/25/2023     Procedure: Procedure(s) (LRB):  EXCISION, LESION, LOWER EXTREMITY (Left) Wide debridement of skin, subcutaneous tissue, fascia > 50 cm sq    Surgeon(s) and Role:     * [place] MD - Primary    Assisting Surgeon: None    Pre-Operative Diagnosis: Yesi's gangrene [N49.3]    Post-Operative Diagnosis: Post-Op Diagnosis Codes:     * Yesi's gangrene [N49.3]    Anesthesia: General    Operative Findings (including complications, if any): Abscess cavity left upper medial thigh, Necrotizing Fasciitis    Description of Technical Procedures:   patient was taken to the operating room and placed on the table in the supine position.  The patient's hemiscrotum on the left in a portion of the left groin had been previously I indeed this was obviously infected and was extended further large amount of foul-smelling purulence was brought forth proximally 50 squared cm of skin subcutaneous tissue was excised there were thrombosed veins there was other signs of a necrotizing soft tissue infection adequate debridement was performed the extent was proximal to the inguinal ligament and just above the knee medially.  This was excised sharply hemostasis was assured wound was irrigated with saline cultures were taken of purulent material peroxide was used to cleanse the wound.  Once again hemostasis was assured the wound was packed with Betadine gauze sterile dressings applied.     Significant Surgical Tasks Conducted by the Assistant(s), if Applicable:      Estimated Blood Loss (EBL): * No values recorded between 5/25/2023 11:14 PM and 5/25/2023 11:46 PM *           Implants: * No implants in log *    Specimens:   Specimen (24h ago, onward)      None                    Condition: Good    Disposition: PACU - hemodynamically stable.    Attestation: I was present and scrubbed for the entire  procedure.

## 2023-05-26 NOTE — ANESTHESIA POSTPROCEDURE EVALUATION
Anesthesia Post Evaluation    Patient: John Addison    Procedure(s) Performed: Procedure(s) (LRB):  EXCISION, LESION, LOWER EXTREMITY (Left)    Final Anesthesia Type: general      Patient location during evaluation: PACU  Patient participation: Yes- Able to Participate  Level of consciousness: awake  Post-procedure vital signs: reviewed and stable  Pain management: adequate  Airway patency: patent    PONV status at discharge: No PONV  Anesthetic complications: no      Cardiovascular status: hemodynamically stable  Respiratory status: unassisted  Hydration status: euvolemic  Follow-up not needed.          Vitals Value Taken Time   /69 05/26/23 0445   Temp 36.8 °C (98.3 °F) 05/26/23 0445   Pulse 113 05/26/23 0445   Resp 19 05/26/23 0445   SpO2 95 % 05/26/23 0445         Event Time   Out of Recovery 23:55:00         Pain/Matthew Score: Pain Rating Prior to Med Admin: 6 (5/25/2023  4:08 AM)  Pain Rating Post Med Admin: 2 (5/25/2023  4:50 AM)

## 2023-05-26 NOTE — TRANSFER OF CARE
Anesthesia Transfer of Care Note    Patient: John Addison    Procedure(s) Performed: Procedure(s) (LRB):  EXCISION, LESION, LOWER EXTREMITY (Left)    Patient location: PACU    Anesthesia Type: general    Transport from OR: Transported from OR on room air with adequate spontaneous ventilation    Post pain: adequate analgesia    Post assessment: no apparent anesthetic complications and tolerated procedure well    Post vital signs: stable    Level of consciousness: awake and alert    Nausea/Vomiting: no nausea/vomiting    Complications: none    Transfer of care protocol was followed      Last vitals: There were no vitals taken for this visit.

## 2023-05-29 LAB
BACTERIA SPEC ANAEROBE CULT: NORMAL
BACTERIA SPEC CULT: NO GROWTH

## 2023-06-14 NOTE — DISCHARGE SUMMARY
DISCHARGE NOTE    DISCHARGE DATE:   5/25/2023    PRINCIPAL DIAGNOSIS:  Yesi's Gangrene    OUTCOME:  Satisfactory    DISPOSITION:  LTAC    FOLLOWUP:  LTAC team

## 2023-07-13 ENCOUNTER — TELEPHONE (OUTPATIENT)
Dept: SURGERY | Facility: CLINIC | Age: 68
End: 2023-07-13
Payer: MEDICARE

## 2023-07-13 NOTE — TELEPHONE ENCOUNTER
Received call from Sarah at [facility]. [phone], stated that patient has green drainage from wound, but denies fever, redness or edema.  Consulted Dr. Jacinda Olivas and she said to continue with wet to dry with saline dressing changes, using ABD and kerlix daily and contact us if signs of infection appear.Called Sarah with instructions and reminder for him to see us at [facility] on 7/18/23.  She verbalized understanding.

## 2023-07-17 ENCOUNTER — OFFICE VISIT (OUTPATIENT)
Dept: UROLOGY | Facility: CLINIC | Age: 68
End: 2023-07-17
Payer: MEDICARE

## 2023-07-17 ENCOUNTER — EXTERNAL HOME HEALTH (OUTPATIENT)
Dept: HOME HEALTH SERVICES | Facility: HOSPITAL | Age: 68
End: 2023-07-17
Payer: MEDICARE

## 2023-07-17 VITALS
SYSTOLIC BLOOD PRESSURE: 111 MMHG | HEART RATE: 100 BPM | HEIGHT: 70 IN | OXYGEN SATURATION: 96 % | DIASTOLIC BLOOD PRESSURE: 75 MMHG | BODY MASS INDEX: 36.34 KG/M2 | RESPIRATION RATE: 20 BRPM | WEIGHT: 253.81 LBS | TEMPERATURE: 98 F

## 2023-07-17 DIAGNOSIS — N47.1 PHIMOSIS: ICD-10-CM

## 2023-07-17 DIAGNOSIS — N49.3 FOURNIER'S GANGRENE: ICD-10-CM

## 2023-07-17 LAB
BILIRUB SERPL-MCNC: NORMAL MG/DL
BLOOD URINE, POC: NORMAL
COLOR, POC UA: YELLOW
GLUCOSE UR QL STRIP: NORMAL
KETONES UR QL STRIP: NORMAL
LEUKOCYTE ESTERASE URINE, POC: NORMAL
NITRITE, POC UA: NORMAL
PH, POC UA: 6
POC RESIDUAL URINE VOLUME: 35 ML (ref 0–100)
PROTEIN, POC: 1
SPECIFIC GRAVITY, POC UA: 1.03
UROBILINOGEN, POC UA: 1

## 2023-07-17 PROCEDURE — 99203 PR OFFICE/OUTPT VISIT, NEW, LEVL III, 30-44 MIN: ICD-10-PCS | Mod: S$PBB,,, | Performed by: UROLOGY

## 2023-07-17 PROCEDURE — 99203 OFFICE O/P NEW LOW 30 MIN: CPT | Mod: S$PBB,,, | Performed by: UROLOGY

## 2023-07-17 PROCEDURE — 51798 US URINE CAPACITY MEASURE: CPT | Mod: PBBFAC | Performed by: UROLOGY

## 2023-07-17 PROCEDURE — 99215 OFFICE O/P EST HI 40 MIN: CPT | Mod: PBBFAC | Performed by: UROLOGY

## 2023-07-17 PROCEDURE — 81001 URINALYSIS AUTO W/SCOPE: CPT | Mod: PBBFAC | Performed by: UROLOGY

## 2023-07-17 NOTE — PROGRESS NOTES
Seen by Dr. Marrero will f/u with general surgery on wound then f/u with apt about circumcision prn

## 2023-07-17 NOTE — PROGRESS NOTES
CC:  Phimosis    HPI:  John Addison is a 68 y.o. male being seen in consultation for phimosis.  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  The left testicle was placed in a thigh pouch due to inadequate coverage.  A Diego catheter was placed but apparently this was quite difficult due to phimosis.  He is here for evaluation of that.  He is still has a wound and is not completely recovered from the Yesi's gangrene.  He states that he has been unable to retract the foreskin for quite some time.  He is currently urinating without difficulty.  He does have some dribbling but otherwise feels like he is emptying the bladder and has a good stream when he is urinating.    Bladder scan residual:  35 ml    Urinalysis:  Results for orders placed or performed in visit on 07/17/23   POCT URINE DIPSTICK WITH MICROSCOPE, AUTOMATED   Result Value Ref Range    Color, UA Yellow     Spec Grav UA 1.030     pH, UA 6.0     WBC, UA trace     Nitrite, UA pos     Protein, POC 1.0     Glucose, UA neg     Ketones, UA neg     Urobilinogen, UA 1.0     Bilirubin, POC neg     Blood, UA neg      Microscopic:  Negative      Lab Results:    Recent Labs     07/03/23  0349   CREATININE 0.85        Data Review:  Note from referring provider, Cséar Al MD.  Operative note.    ROS:  All systems reviewed and are negative except as documented in HPI and/or Assessment and Plan.     Patient Active Problem List:     Patient Active Problem List   Diagnosis    Type 2 diabetes mellitus with diabetic neuropathy, without long-term current use of insulin    Coronary artery disease    Tobacco abuse    Hypertension    Hyperlipidemia LDL goal <70    Pain in both lower extremities    Uncontrolled type 2 diabetes mellitus with nephropathy    Abdominal swelling    Abnormal findings on diagnostic imaging of lung    Abnormal liver function tests    Anemia    Chronic allergic conjunctivitis    Chronic obstructive  pulmonary disease    Current drinker of alcohol    Intra-abdominal lymphadenopathy    Tegmen defect of base of skull    Cigarette nicotine dependence with nicotine-induced disorder    Atypical chest pain    Xerosis of skin    Dystrophic nail    Onychomycosis of multiple toenails with type 2 diabetes mellitus    Avulsion of toenail of left foot    Yesi's gangrene    Smoking    Diabetes mellitus    Yesi's gangrene of scrotum        Past Medical History:  Past Medical History:   Diagnosis Date    COPD (chronic obstructive pulmonary disease)     Coronary artery disease     Diabetes mellitus     hyperlipidemia     Hypertension         Past Surgical History:  Past Surgical History:   Procedure Laterality Date    CORONARY ANGIOPLASTY      EXCISION, LESION, LOWER EXTREMITY Left 5/25/2023    Procedure: EXCISION, LESION, LOWER EXTREMITY;  Surgeon: South Terry MD;  Location: [facility] OR;  Service: General;  Laterality: Left;    TONSILLECTOMY  07/2018    WOUND DEBRIDEMENT N/A 5/10/2023    Procedure: DEBRIDEMENT, WOUND;  Surgeon: Smith Lay MD;  Location: [facility] OR;  Service: General;  Laterality: N/A;  perineum debridement, lithotomy        Family History:  Family History   Problem Relation Age of Onset    Hypertension Mother     Heart failure Mother     Heart attack Mother     Coronary artery disease Mother     Cancer Father     Cancer Sister         Social History:  Social History     Socioeconomic History    Marital status: Single   Tobacco Use    Smoking status: Every Day     Packs/day: 0.50     Types: Cigarettes    Smokeless tobacco: Never   Substance and Sexual Activity    Alcohol use: Yes     Comment: beer 2x/month    Drug use: Yes     Types: Marijuana    Sexual activity: Not Currently     Social Determinants of Health     Financial Resource Strain: Low Risk     Difficulty of Paying Living Expenses: Not hard at all   Food Insecurity: No Food Insecurity    Worried About Running Out of Food in the Last  Year: Never true    Ran Out of Food in the Last Year: Never true   Transportation Needs: No Transportation Needs    Lack of Transportation (Medical): No    Lack of Transportation (Non-Medical): No   Physical Activity: Inactive    Days of Exercise per Week: 0 days    Minutes of Exercise per Session: 0 min   Stress: No Stress Concern Present    Feeling of Stress : Not at all   Social Connections: Moderately Isolated    Frequency of Communication with Friends and Family: More than three times a week    Frequency of Social Gatherings with Friends and Family: Three times a week    Attends Mandae[employer] Services: More than 4 times per year    Active Member of Clubs or Organizations: No    Attends Club or Organization Meetings: Never    Marital Status:    Housing Stability: Low Risk     Unable to Pay for Housing in the Last Year: No    Number of Places Lived in the Last Year: 1    Unstable Housing in the Last Year: No        Allergies:  Review of patient's allergies indicates:  No Known Allergies     Objective:  Vitals:    07/17/23 1415   BP: 111/75   Pulse: 100   Resp: 20   Temp: 97.8 °F (36.6 °C)     General:  Well developed, well nourished adult male in no acute distress  Abdomen: Soft, nontender, no masses  Extremities:  No clubbing, cyanosis, or edema  Neurologic:  Grossly intact  Musculoskeletal:  Normal tone  Penis:  Uncircumcised with phimosis    Assessment:  1. Phimosis  - Ambulatory referral/consult to Urology  - POCT URINE DIPSTICK WITH MICROSCOPE, AUTOMATED    2. Yesi's gangrene  - Ambulatory referral/consult to Urology  - POCT URINE DIPSTICK WITH MICROSCOPE, AUTOMATED     Plan:  He may very well need a circumcision sometime in the future.  Patient does not want to have anymore surgery right now and I would not do this until the Yesi's gangrene wounds are completely healed.    Follow-up:  He will return once he is completely recovered from the Yesi's gangrene.

## 2023-07-18 ENCOUNTER — OFFICE VISIT (OUTPATIENT)
Dept: SURGERY | Facility: CLINIC | Age: 68
End: 2023-07-18
Payer: MEDICARE

## 2023-07-18 VITALS
DIASTOLIC BLOOD PRESSURE: 71 MMHG | HEIGHT: 70 IN | HEART RATE: 100 BPM | TEMPERATURE: 98 F | RESPIRATION RATE: 18 BRPM | WEIGHT: 252.63 LBS | BODY MASS INDEX: 36.17 KG/M2 | SYSTOLIC BLOOD PRESSURE: 126 MMHG | OXYGEN SATURATION: 98 %

## 2023-07-18 DIAGNOSIS — N49.3 FOURNIER'S GANGRENE: Primary | ICD-10-CM

## 2023-07-18 PROCEDURE — 99215 OFFICE O/P EST HI 40 MIN: CPT | Mod: PBBFAC

## 2023-07-18 RX ORDER — GABAPENTIN 300 MG/1
300 CAPSULE ORAL 3 TIMES DAILY
Qty: 30 CAPSULE | Refills: 0 | Status: SHIPPED | OUTPATIENT
Start: 2023-07-18 | End: 2023-07-18

## 2023-07-18 RX ORDER — GABAPENTIN 300 MG/1
300 CAPSULE ORAL 3 TIMES DAILY
Qty: 30 CAPSULE | Refills: 0 | Status: SHIPPED | OUTPATIENT
Start: 2023-07-18 | End: 2023-08-30 | Stop reason: SDUPTHER

## 2023-07-18 NOTE — PROGRESS NOTES
Patient seen by Dr. COLLEEN Mireles will return in 6 weeks. Written and verbal discharge instructions given.

## 2023-07-18 NOTE — PROGRESS NOTES
I have reviewed the notes, assessments, and/or procedures performed by the resident, I concur with her/his documentation of John Addison.     Zhane Astorga MD

## 2023-07-18 NOTE — PROGRESS NOTES
[facility] General Surgery Clinic Note    HPI:  68M with PMH significant for HTN, HLD, uncontrolled DM type 2, and CAD/mi s/p PCI, and nicotine dependence presents to clinic today for f/u after surgery for Yesi's gangrene. Pt says he has pain in his L groin 2/2 large wound. Wound is not consistently painful but does hurt at random times during the day. Pt is following with home health and wound care and continuing to have wet to dry wound changes. Saw urology yesterday for phimosis and is not having trouble with urination.    PMH:   Past Medical History:   Diagnosis Date    COPD (chronic obstructive pulmonary disease)     Coronary artery disease     Diabetes mellitus     hyperlipidemia     Hypertension       Meds:   Current Outpatient Medications:     albuterol (PROVENTIL/VENTOLIN HFA) 90 mcg/actuation inhaler, Inhale 2 puffs into the lungs as needed for Wheezing or Shortness of Breath., Disp: 18 g, Rfl: 11    xihqu-twet-NjBVF-collag-mv-min 7-7-1.5 gram PwPk, Take 1 each by mouth 2 (two) times a day., Disp: 60 each, Rfl: 0    aspirin 81 MG Chew, Take 1 tablet (81 mg total) by mouth once daily., Disp: 360 tablet, Rfl: 0    atorvastatin (LIPITOR) 80 MG tablet, Take 1 tablet (80 mg total) by mouth once daily., Disp: 90 tablet, Rfl: 3    clopidogreL (PLAVIX) 75 mg tablet, Take 1 tablet (75 mg total) by mouth once daily., Disp: 90 tablet, Rfl: 3    dulaglutide (TRULICITY) 3 mg/0.5 mL pen injector, Inject 3 mg into the skin every 7 days., Disp: 4 pen, Rfl: 11    famotidine (PEPCID) 40 MG tablet, Take 0.5 tablets (20 mg total) by mouth 2 (two) times daily., Disp: 180 tablet, Rfl: 3    ferrous sulfate 325 (65 FE) MG EC tablet, Take 1 tablet (325 mg total) by mouth 2 (two) times daily., Disp: 60 tablet, Rfl: 0    finasteride (PROSCAR) 5 mg tablet, Take 1 tablet (5 mg total) by mouth once daily., Disp: 30 tablet, Rfl: 11    fluticasone-salmeterol diskus inhaler 250-50 mcg, Inhale 1 puff into the lungs 2 (two) times daily.  Controller, Disp: 60 each, Rfl: 11    folic acid (FOLVITE) 1 MG tablet, Take 1 tablet (1 mg total) by mouth once daily., Disp: 90 tablet, Rfl: 0    glipiZIDE (GLUCOTROL) 10 MG TR24, Take 1 tablet (10 mg total) by mouth daily with breakfast., Disp: 30 tablet, Rfl: 2    insulin detemir U-100 (LEVEMIR) 100 unit/mL injection, Inject 78 Units into the skin 2 (two) times a day., Disp: 140.4 mL, Rfl: 0    lisinopriL (PRINIVIL,ZESTRIL) 20 MG tablet, Take 1 tablet (20 mg total) by mouth once daily., Disp: 90 tablet, Rfl: 0    metoprolol tartrate (LOPRESSOR) 50 MG tablet, Take 1 tablet (50 mg total) by mouth 2 (two) times daily., Disp: 180 tablet, Rfl: 0    sodium bicarbonate 650 MG tablet, Take 1 tablet (650 mg total) by mouth 2 (two) times daily., Disp: 60 tablet, Rfl: 0    tamsulosin (FLOMAX) 0.4 mg Cap, Take 1 capsule (0.4 mg total) by mouth 2 (two) times a day., Disp: 60 capsule, Rfl: 11    thiamine 100 MG tablet, Take 1 tablet (100 mg total) by mouth once daily., Disp: 30 tablet, Rfl: 0  Allergies: Review of patient's allergies indicates:  No Known Allergies  Social History:   Social History     Tobacco Use    Smoking status: Every Day     Packs/day: 0.50     Types: Cigarettes    Smokeless tobacco: Never   Substance Use Topics    Alcohol use: Yes     Comment: beer 2x/month    Drug use: Yes     Types: Marijuana     Family History:   Family History   Problem Relation Age of Onset    Hypertension Mother     Heart failure Mother     Heart attack Mother     Coronary artery disease Mother     Cancer Father     Cancer Sister      Surgical History:   Past Surgical History:   Procedure Laterality Date    CORONARY ANGIOPLASTY      EXCISION, LESION, LOWER EXTREMITY Left 5/25/2023    Procedure: EXCISION, LESION, LOWER EXTREMITY;  Surgeon: South Terry MD;  Location: [facility];  Service: General;  Laterality: Left;    TONSILLECTOMY  07/2018    WOUND DEBRIDEMENT N/A 5/10/2023    Procedure: DEBRIDEMENT, WOUND;  Surgeon: Smith  MD Alireza;  Location: [facility];  Service: General;  Laterality: N/A;  perineum debridement, lithotomy     Review of Systems:  Skin: No rashes or itching.  Head: Denies headache or recent trauma.  Eyes: Denies eye pain or double vision.  Neck: Denies swelling or hoarseness of voice.  Respiratory: Denies shortness of breath or chest pain  Cardiac: Denies palpitations or swelling in hands/feet.  Gastrointestinal: Denies nausea, denies vomiting.   Urinary: Denies dysuria or hematuria.  Vascular: Denies claudication or leg swelling.  Neuro: Denies motor deficits. Denies weakness.  Endocrine: Denies excessive sweating or cold intolerance.  Psych: Denies memory problems. Denies anxiety.    Objective:    Vitals:  Vitals:    07/18/23 1024   BP: 126/71   Pulse: 100   Resp: 18   Temp: 98.4 °F (36.9 °C)        Intake/Output:  [unfilled]     Physical Exam:  Gen: NAD  Neuro: awake, alert, answering questions appropriately  CV: RRR  Resp: non-labored breathing, LELAND  Abd: soft, ND, NT  : Phimosis  Ext: moves all 4 spontaneously and purposefully  Skin: warm, well perfused; large left thigh and groin wound without purulence, fluctuance, induration; granulation tissue present    Pertinent Labs:  none    Imaging:  none    Micro/Path/Other:  none    Assessment/Plan:  68M with PMH significant for HTN, HLD, uncontrolled DM type 2, and CAD/mi s/p PCI, and nicotine dependence presents to clinic today for f/u after surgery for Yesi's gangrene. Pt says he has pain in his L groin 2/2 large wound. Wound is not consistently painful but does hurt at random times during the day. Pt is following with home health and wound care and continuing to have wet to dry wound changes. Pt with phimosis but is urinating normally, seen with urology yesterday.     - Pt with large left inner thigh/groin/scrotal wound with intact granulation tissue without purulence, fluctuance, induration  - Seeing home health for daily wet to dry dressing changes; cont  dressing changes  - RTC in 6 wks for f/u    M. Helio Mireles Jr., MD  [facility] General Surgery, PGY-1  07/18/2023 11:29 AM

## 2023-07-25 ENCOUNTER — PATIENT MESSAGE (OUTPATIENT)
Dept: ADMINISTRATIVE | Facility: HOSPITAL | Age: 68
End: 2023-07-25
Payer: MEDICARE

## 2023-07-27 ENCOUNTER — DOCUMENT SCAN (OUTPATIENT)
Dept: HOME HEALTH SERVICES | Facility: HOSPITAL | Age: 68
End: 2023-07-27
Payer: MEDICARE

## 2023-08-01 ENCOUNTER — OFFICE VISIT (OUTPATIENT)
Dept: INTERNAL MEDICINE | Facility: CLINIC | Age: 68
End: 2023-08-01
Payer: MEDICARE

## 2023-08-01 ENCOUNTER — TELEPHONE (OUTPATIENT)
Dept: INTERNAL MEDICINE | Facility: CLINIC | Age: 68
End: 2023-08-01

## 2023-08-01 VITALS
RESPIRATION RATE: 20 BRPM | WEIGHT: 255.38 LBS | HEIGHT: 70 IN | SYSTOLIC BLOOD PRESSURE: 101 MMHG | BODY MASS INDEX: 36.56 KG/M2 | HEART RATE: 97 BPM | TEMPERATURE: 98 F | DIASTOLIC BLOOD PRESSURE: 68 MMHG

## 2023-08-01 DIAGNOSIS — I10 PRIMARY HYPERTENSION: ICD-10-CM

## 2023-08-01 DIAGNOSIS — N49.3 FOURNIER'S GANGRENE OF SCROTUM: Primary | ICD-10-CM

## 2023-08-01 DIAGNOSIS — E78.5 HYPERLIPIDEMIA LDL GOAL <70: ICD-10-CM

## 2023-08-01 DIAGNOSIS — E11.40 TYPE 2 DIABETES MELLITUS WITH DIABETIC NEUROPATHY, WITHOUT LONG-TERM CURRENT USE OF INSULIN: ICD-10-CM

## 2023-08-01 DIAGNOSIS — I25.10 CORONARY ARTERY DISEASE, UNSPECIFIED VESSEL OR LESION TYPE, UNSPECIFIED WHETHER ANGINA PRESENT, UNSPECIFIED WHETHER NATIVE OR TRANSPLANTED HEART: ICD-10-CM

## 2023-08-01 PROBLEM — F17.200 SMOKING: Status: RESOLVED | Noted: 2023-05-23 | Resolved: 2023-08-01

## 2023-08-01 PROCEDURE — 99214 PR OFFICE/OUTPT VISIT, EST, LEVL IV, 30-39 MIN: ICD-10-PCS | Mod: S$PBB,,, | Performed by: NURSE PRACTITIONER

## 2023-08-01 PROCEDURE — 99214 OFFICE O/P EST MOD 30 MIN: CPT | Mod: S$PBB,,, | Performed by: NURSE PRACTITIONER

## 2023-08-01 PROCEDURE — 99215 OFFICE O/P EST HI 40 MIN: CPT | Mod: PBBFAC | Performed by: NURSE PRACTITIONER

## 2023-08-01 RX ORDER — GLIPIZIDE 10 MG/1
10 TABLET, FILM COATED, EXTENDED RELEASE ORAL
Qty: 90 TABLET | Refills: 1 | Status: SHIPPED | OUTPATIENT
Start: 2023-08-01

## 2023-08-01 RX ORDER — INSULIN DEGLUDEC 200 U/ML
58 INJECTION, SOLUTION SUBCUTANEOUS DAILY
Qty: 3 PEN | Refills: 3 | Status: SHIPPED | OUTPATIENT
Start: 2023-08-01 | End: 2023-09-27 | Stop reason: SDUPTHER

## 2023-08-01 RX ORDER — INSULIN DEGLUDEC 200 U/ML
56 INJECTION, SOLUTION SUBCUTANEOUS DAILY
COMMUNITY
End: 2023-08-01 | Stop reason: SDUPTHER

## 2023-08-01 RX ORDER — FERROUS SULFATE 324(65)MG
TABLET, DELAYED RELEASE (ENTERIC COATED) ORAL 2 TIMES DAILY
COMMUNITY
Start: 2023-07-05

## 2023-08-01 NOTE — TELEPHONE ENCOUNTER
Please assist with getting pt's appointments rescheduled.     He needs follow up with [facility] GI, Cardiology and Endocrine clinics please.     Thank you

## 2023-08-01 NOTE — TELEPHONE ENCOUNTER
Can one of you schedule patient for an established appointment he missed his January and May appointments. Thanks

## 2023-08-01 NOTE — PROGRESS NOTES
Cammy L Sammy, NP   [facility]  [address] [address]      PATIENT NAME: John Addison  : 1955  DATE: 23  MRN: [id number]        Reason for Visit / Chief Complaint: Follow-up       History of Present Illness / Problem Focused Workflow     John Addison presents to the clinic with Follow-up     22: 66 yo AAM for routine follow up presenting unaccompanied. Last visit 22. PMH includes HTN, HLD, CAD/NSTEMI (2016) s/p PCI of left circumflex, inferior wall STEMI s/p PTCA/BHAKTI of distal RCA 2017, type 2 DM, elevated LFTs, lymphadenopathy, tobacco abuse, and morbid obesity. He did not bring medications for visit. Has home health to check blood sugars. He is under care of [facility]. /84. He refuses colonoscopy however willing to complete stool test. Otherwise, denies any other concerns/ complaints, CP, SOB, HA, dizziness, abdominal pain, poor appetite, n/v/d, bloody stools.     23: 69 yo AAM for follow up. Patient presented to ER 5/10/23 for c/o groin pain and drainage from scrotum with diagnosis of yasmin's gangrene/ sepsis. Surgical debridement 5/10/23. Anaerobic culture significant for prevotella species and tissue culture significant for Enterococcus faecalis. He was sent to [facility]. He had left thigh abscess for which he underwent I&D for as well. He was discharged from [facility] 23. He has HH daily and caring for wound. He states he is being told it is healing well. He denies any fever, chills, weakness, body aches, poor appetite. Last A1c 9.7% 2023. He is compliant with Glipizide, Tresiba and Trulicity. He is not taking Levemir. He does not administer his insulin and HH nurse goes once a day. He was being followed by [facility] endo however last visit 2022. He is in need of f/u with GI, Cardiology, and Endo. No other concerns at this time.     Other providers:  [facility] GI  [facility] Cardiology  [facility]  Pulmonology- last visit 2/2/21  [facility] ENT   Dr. Romero/ Dr. Juarez Podiatry  [facility] Endo  Dr. Smith Lay- General Surgeon           Review of Systems     Review of Systems   Constitutional:  Negative for appetite change, chills, fatigue, fever and unexpected weight change.   HENT:  Negative for congestion, ear pain, hearing loss, sinus pressure, sore throat and tinnitus.    Respiratory:  Negative for cough, chest tightness, shortness of breath and wheezing.    Cardiovascular:  Negative for chest pain, palpitations and leg swelling.   Gastrointestinal:  Negative for abdominal distention, abdominal pain, blood in stool, constipation, diarrhea, nausea and vomiting.   Genitourinary:  Negative for dysuria and hematuria.   Musculoskeletal:  Negative for arthralgias and myalgias.   Skin:  Negative for pallor.   Allergic/Immunologic: Negative for environmental allergies.   Neurological:  Negative for dizziness, syncope, weakness, numbness and headaches.   Hematological:  Negative for adenopathy. Does not bruise/bleed easily.   Psychiatric/Behavioral:  Negative for agitation, behavioral problems, confusion, hallucinations, sleep disturbance and suicidal ideas. The patient is not nervous/anxious.        Medical / Social / Family History     ----------------------------  COPD (chronic obstructive pulmonary disease)  Coronary artery disease  Diabetes mellitus  hyperlipidemia  Hypertension     Past Surgical History:   Procedure Laterality Date    CORONARY ANGIOPLASTY      EXCISION, LESION, LOWER EXTREMITY Left 5/25/2023    Procedure: EXCISION, LESION, LOWER EXTREMITY;  Surgeon: South Terry MD;  Location: [facility] OR;  Service: General;  Laterality: Left;    TONSILLECTOMY  07/2018    WOUND DEBRIDEMENT N/A 5/10/2023    Procedure: DEBRIDEMENT, WOUND;  Surgeon: Smith Lay MD;  Location: [facility] OR;  Service: General;  Laterality: N/A;  perineum debridement, lithotomy       Social History     Socioeconomic History     Marital status: Single   Tobacco Use    Smoking status: Every Day     Current packs/day: 0.50     Average packs/day: 0.5 packs/day for 59.2 years (29.6 ttl pk-yrs)     Types: Cigarettes     Start date: 5/8/1964    Smokeless tobacco: Never   Substance and Sexual Activity    Alcohol use: Yes     Comment: beer 2x/month    Drug use: Yes     Types: Marijuana    Sexual activity: Not Currently     Social Determinants of Health     Financial Resource Strain: Low Risk  (5/19/2023)    Overall Financial Resource Strain (CARDIA)     Difficulty of Paying Living Expenses: Not hard at all   Food Insecurity: No Food Insecurity (5/19/2023)    Hunger Vital Sign     Worried About Running Out of Food in the Last Year: Never true     Ran Out of Food in the Last Year: Never true   Transportation Needs: No Transportation Needs (5/19/2023)    PRAPARE - Transportation     Lack of Transportation (Medical): No     Lack of Transportation (Non-Medical): No   Physical Activity: Inactive (5/19/2023)    Exercise Vital Sign     Days of Exercise per Week: 0 days     Minutes of Exercise per Session: 0 min   Stress: No Stress Concern Present (5/19/2023)    [employer] - Occupational Stress Questionnaire     Feeling of Stress : Not at all   Social Connections: Moderately Isolated (6/16/2023)    Social Connection and Isolation Panel [NHANES]     Frequency of Communication with Friends and Family: More than three times a week     Frequency of Social Gatherings with Friends and Family: Three times a week     Attends Mu-ism Services: More than 4 times per year     Active Member of Clubs or Organizations: No     Attends Club or Organization Meetings: Never     Marital Status:    Housing Stability: Low Risk  (5/19/2023)    Housing Stability Vital Sign     Unable to Pay for Housing in the Last Year: No     Number of Places Lived in the Last Year: 1     Unstable Housing in the Last Year: No        Family History  "  Problem Relation Age of Onset    Hypertension Mother     Heart failure Mother     Heart attack Mother     Coronary artery disease Mother     Cancer Father     Cancer Sister         Medications and Allergies     Medications  Current Outpatient Medications   Medication Instructions    albuterol (PROVENTIL/VENTOLIN HFA) 90 mcg/actuation inhaler 2 puffs, Inhalation, As needed (PRN)    [id number]-yaki-SvRYC-collag-mv-min 7-7-1.5 gram PwPk 1 each, Oral, 2 times daily    aspirin 81 mg, Oral, Daily    atorvastatin (LIPITOR) 80 mg, Oral, Daily    clopidogreL (PLAVIX) 75 mg, Oral, Daily    dulaglutide (TRULICITY) 3 mg, Subcutaneous, Every 7 days    famotidine (PEPCID) 20 mg, Oral, 2 times daily    ferrous sulfate 324 mg (65 mg iron) TbEC Oral, 2 times daily    ferrous sulfate 325 mg, Oral, 2 times daily    finasteride (PROSCAR) 5 mg, Oral, Daily    fluticasone-salmeterol diskus inhaler 250-50 mcg 1 puff, Inhalation, 2 times daily, Controller    folic acid (FOLVITE) 1 mg, Oral, Daily    gabapentin (NEURONTIN) 300 mg, Oral, 3 times daily    glipiZIDE (GLUCOTROL) 10 mg, Oral, With breakfast    insulin degludec (TRESIBA FLEXTOUCH U-200) 58 Units, Subcutaneous, Daily    lisinopriL (PRINIVIL,ZESTRIL) 20 mg, Oral, Daily    metoprolol tartrate (LOPRESSOR) 50 mg, Oral, 2 times daily    sodium bicarbonate 650 mg, Oral, 2 times daily    tamsulosin (FLOMAX) 0.4 mg, Oral, 2 times daily    thiamine 100 mg, Oral, Daily       Allergies  Review of patient's allergies indicates:  No Known Allergies    Physical Examination     Visit Vitals  /68 (BP Location: Left arm, Patient Position: Sitting, BP Method: X-Large (Automatic))   Pulse 97   Temp 98.1 °F (36.7 °C) (Oral)   Resp 20   Ht 5' 10" (1.778 m)   Wt 115.8 kg (255 lb 6.4 oz)   BMI 36.65 kg/m²       Physical Exam  Vitals and nursing note reviewed.   Constitutional:       Appearance: Normal appearance. He is not ill-appearing.   HENT:      Head: Normocephalic.   Cardiovascular:      Rate " and Rhythm: Normal rate and regular rhythm.      Pulses: Normal pulses.           Dorsalis pedis pulses are 2+ on the right side and 2+ on the left side.        Posterior tibial pulses are 2+ on the right side and 2+ on the left side.      Heart sounds: Murmur heard.   Pulmonary:      Effort: Pulmonary effort is normal. No respiratory distress.      Breath sounds: Normal breath sounds.   Abdominal:      General: Bowel sounds are normal. There is no distension.      Palpations: Abdomen is soft. There is no mass.      Tenderness: There is no abdominal tenderness. There is no guarding or rebound.      Hernia: No hernia is present.   Musculoskeletal:         General: Normal range of motion.      Cervical back: Normal range of motion and neck supple. No tenderness.      Right lower leg: No edema.      Left lower leg: No edema.   Feet:      Right foot:      Protective Sensation: 5 sites tested.  5 sites sensed.      Skin integrity: Callus present.      Toenail Condition: Right toenails are abnormally thick and long. Fungal disease present.     Left foot:      Protective Sensation: 5 sites tested.  3 sites sensed.      Skin integrity: Callus present.      Toenail Condition: Left toenails are abnormally thick and long. Fungal disease present.  Lymphadenopathy:      Cervical: No cervical adenopathy.   Skin:     General: Skin is warm and dry.      Capillary Refill: Capillary refill takes less than 2 seconds.   Neurological:      Mental Status: He is alert and oriented to person, place, and time. Mental status is at baseline.      Motor: No weakness.   Psychiatric:         Mood and Affect: Mood normal.         Behavior: Behavior normal.         Thought Content: Thought content normal.         Judgment: Judgment normal.           Results     Lab Results   Component Value Date    WBC 7.02 07/03/2023    RBC 3.33 (L) 07/03/2023    HGB 9.6 (L) 07/03/2023    HCT 28.9 (L) 07/03/2023    MCV 86.8 07/03/2023    MCH 28.8 07/03/2023     "MCHC 33.2 07/03/2023    RDW 17.1 (H) 07/03/2023     07/03/2023    MPV 9.6 07/03/2023     Sodium Level   Date Value Ref Range Status   07/03/2023 142 136 - 145 mmol/L Final     Potassium Level   Date Value Ref Range Status   07/03/2023 4.1 3.5 - 5.1 mmol/L Final     Carbon Dioxide   Date Value Ref Range Status   07/03/2023 26 23 - 31 mmol/L Final     Blood Urea Nitrogen   Date Value Ref Range Status   07/03/2023 26.3 (H) 8.4 - 25.7 mg/dL Final     Creatinine   Date Value Ref Range Status   07/03/2023 0.85 0.73 - 1.18 mg/dL Final     Calcium Level Total   Date Value Ref Range Status   07/03/2023 9.4 8.8 - 10.0 mg/dL Final     Albumin Level   Date Value Ref Range Status   07/03/2023 2.5 (L) 3.4 - 4.8 g/dL Final     Bilirubin Total   Date Value Ref Range Status   07/03/2023 0.2 <=1.5 mg/dL Final     Alkaline Phosphatase   Date Value Ref Range Status   07/03/2023 173 (H) 40 - 150 unit/L Final     Aspartate Aminotransferase   Date Value Ref Range Status   07/03/2023 68 (H) 5 - 34 unit/L Final     Alanine Aminotransferase   Date Value Ref Range Status   07/03/2023 106 (H) 0 - 55 unit/L Final     Estimated GFR-Non    Date Value Ref Range Status   06/26/2018 65 (L) >>=90 mL/min Final     No results found for: "CHOL"  No results found for: "HDL"  No results found for: "LDLCALC"  No results found for: "TRIG"  No results found for: "CHOLHDL"  No results found for: "TSH"  Lab Results   Component Value Date    PHUR 6.0 07/17/2023    SPECGRAV 1.030 07/17/2023    PROTEINUA Negative 06/16/2023    KETONESU neg 07/17/2023    NITRITE pos 07/17/2023    LEUKOCYTESUR Negative 06/16/2023     Lab Results   Component Value Date    HGBA1C 9.7 (H) 05/10/2023    HGBA1C 12.1 (H) 06/26/2018    HGBA1C 11.9 (H) 06/25/2018     No results found for: "MICROALBUR", "[id number]"   Results for orders placed in visit on 08/22/22    Diabetic Eye Screening Photo         Assessment       ICD-10-CM ICD-9-CM   1. Yesi's gangrene of " scrotum  N49.3 608.83   2. Primary hypertension  I10 401.9   3. Type 2 diabetes mellitus with diabetic neuropathy, without long-term current use of insulin  E11.40 250.60     357.2   4. Hyperlipidemia LDL goal <70  E78.5 272.4   5. Coronary artery disease, unspecified vessel or lesion type, unspecified whether angina present, unspecified whether native or transplanted heart  I25.10 414.00       Plan       1. Yesi's gangrene of scrotum  Pt has home health with daily wound dressing changes. He had f/u with surgery clinic 7/18/23 with 6 wk f/u. Continue with home health daily dressing changes.     2. Primary hypertension  /68  Follow low sodium diet, < 2 gm/day (avoid high salty foods such as processed meats/ sausage/hurtado/ sandwich meat, chips, pickles, cheese, crackers and soft drinks/ electrolyte replacement drinks).  Avoid tobacco/ alcohol use  Educated on health benefits of at least 5 days/ week of 30 minutes moderate intensity exercise (brisk walking) and 2 or more days/ week of muscle strength activities  Daily ASA 81 mg for CV prevention  Continue current medication regimen  - CBC Auto Differential; Future  - Comprehensive Metabolic Panel; Future  - Hemoglobin A1C; Future  - Lipid Panel; Future  - Microalbumin/Creatinine Ratio, Urine; Future    3. Type 2 diabetes mellitus with diabetic neuropathy, without long-term current use of insulin  A1c 9.7% 5/10/23  CBG from  report > 200  DM foot exam: 8/1/23  He is not taking Levemir BID as prescribed-  nurse administering insulin and goes once daily only. Pt refuses to administer to self.   Metformin was stopped during hospitalization s/t metabolic acidosis  Increase Tresiba to 58 units daily.  Pt needs f/u with Endo  F/u 3 weeks with labs  - insulin degludec (TRESIBA FLEXTOUCH U-200) 200 unit/mL (3 mL) insulin pen; Inject 58 Units into the skin once daily.  Dispense: 3 pen ; Refill: 3  - glipiZIDE (GLUCOTROL) 10 MG TR24; Take 1 tablet (10 mg total) by  mouth daily with breakfast.  Dispense: 90 tablet; Refill: 1  - CBC Auto Differential; Future  - Comprehensive Metabolic Panel; Future  - Hemoglobin A1C; Future  - Lipid Panel; Future  - Microalbumin/Creatinine Ratio, Urine; Future    4. Hyperlipidemia LDL goal <70  Labs  Avoid tobacco/ alcohol  Follow low fat/low cholesterol diet such as avoid/ decrease hurtado, sausage, fried foods, cookies, cakes, chips, cheese, whole milk, butter, mayonnaise. Add olive oil, avocados, lean meats, fresh fruits/ vegetables, heart healthy nuts to diet.  Educated on health benefits of exercise 5 days/ week of at least 30 minutes moderate intensity exercise (brisk walking) and 2 days/ week of muscle strength activities  Daily ASA 81 mg for CV prevention  Continue current medication regimen  - CBC Auto Differential; Future  - Comprehensive Metabolic Panel; Future  - Hemoglobin A1C; Future  - Lipid Panel; Future    5. Coronary artery disease, unspecified vessel or lesion type, unspecified whether angina present, unspecified whether native or transplanted heart  Pt needs Cardiology f/u.  - CBC Auto Differential; Future  - Comprehensive Metabolic Panel; Future  - Hemoglobin A1C; Future  - Lipid Panel; Future      Future Appointments   Date Time Provider Department Center   8/29/2023 10:00 AM SURGERY CLINIC, [facility] GENERAL SURGERY [facility]   8/30/2023  8:15 AM Cammy Christianson NP [facility]        Follow up in about 3 weeks (around 8/22/2023) for with fasting labs before visit.    Signature:  Cammy Christianson NP  [facility]  [address] [address]    Date of encounter: 8/1/23

## 2023-08-03 ENCOUNTER — TELEPHONE (OUTPATIENT)
Dept: INTERNAL MEDICINE | Facility: CLINIC | Age: 68
End: 2023-08-03
Payer: MEDICARE

## 2023-08-03 NOTE — TELEPHONE ENCOUNTER
Please contact [employer] [facility] regarding.  nurse note received regarding concern of medication interactions:     Iron pills and sodium bicarbonate should to be taken at least 2 hours apart.     Patient did not report taking that he was taking oxycodone- acetaminophen.     Thank you

## 2023-08-16 ENCOUNTER — DOCUMENT SCAN (OUTPATIENT)
Dept: HOME HEALTH SERVICES | Facility: HOSPITAL | Age: 68
End: 2023-08-16
Payer: MEDICARE

## 2023-08-28 ENCOUNTER — LAB VISIT (OUTPATIENT)
Dept: LAB | Facility: HOSPITAL | Age: 68
End: 2023-08-28
Attending: NURSE PRACTITIONER
Payer: MEDICARE

## 2023-08-28 ENCOUNTER — TELEPHONE (OUTPATIENT)
Dept: INTERNAL MEDICINE | Facility: CLINIC | Age: 68
End: 2023-08-28
Payer: MEDICARE

## 2023-08-28 DIAGNOSIS — E78.5 HYPERLIPIDEMIA LDL GOAL <70: ICD-10-CM

## 2023-08-28 DIAGNOSIS — I10 PRIMARY HYPERTENSION: ICD-10-CM

## 2023-08-28 DIAGNOSIS — E11.40 TYPE 2 DIABETES MELLITUS WITH DIABETIC NEUROPATHY, WITHOUT LONG-TERM CURRENT USE OF INSULIN: ICD-10-CM

## 2023-08-28 DIAGNOSIS — I25.10 CORONARY ARTERY DISEASE, UNSPECIFIED VESSEL OR LESION TYPE, UNSPECIFIED WHETHER ANGINA PRESENT, UNSPECIFIED WHETHER NATIVE OR TRANSPLANTED HEART: ICD-10-CM

## 2023-08-28 LAB
ALBUMIN SERPL-MCNC: 3.2 G/DL (ref 3.4–4.8)
ALBUMIN/GLOB SERPL: 0.7 RATIO (ref 1.1–2)
ALP SERPL-CCNC: 176 UNIT/L (ref 40–150)
ALT SERPL-CCNC: 55 UNIT/L (ref 0–55)
AST SERPL-CCNC: 43 UNIT/L (ref 5–34)
BASOPHILS # BLD AUTO: 0.06 X10(3)/MCL
BASOPHILS NFR BLD AUTO: 0.7 %
BILIRUB SERPL-MCNC: 0.2 MG/DL
BUN SERPL-MCNC: 20.2 MG/DL (ref 8.4–25.7)
CALCIUM SERPL-MCNC: 9.7 MG/DL (ref 8.8–10)
CHLORIDE SERPL-SCNC: 104 MMOL/L (ref 98–107)
CHOLEST SERPL-MCNC: 110 MG/DL
CHOLEST/HDLC SERPL: 3 {RATIO} (ref 0–5)
CO2 SERPL-SCNC: 28 MMOL/L (ref 23–31)
CREAT SERPL-MCNC: 1.04 MG/DL (ref 0.73–1.18)
EOSINOPHIL # BLD AUTO: 0.22 X10(3)/MCL (ref 0–0.9)
EOSINOPHIL NFR BLD AUTO: 2.4 %
ERYTHROCYTE [DISTWIDTH] IN BLOOD BY AUTOMATED COUNT: 17.6 % (ref 11.5–17)
EST. AVERAGE GLUCOSE BLD GHB EST-MCNC: 223.1 MG/DL
GFR SERPLBLD CREATININE-BSD FMLA CKD-EPI: >60 MLS/MIN/1.73/M2
GLOBULIN SER-MCNC: 4.8 GM/DL (ref 2.4–3.5)
GLUCOSE SERPL-MCNC: 240 MG/DL (ref 82–115)
HBA1C MFR BLD: 9.4 %
HCT VFR BLD AUTO: 34.6 % (ref 42–52)
HDLC SERPL-MCNC: 34 MG/DL (ref 35–60)
HGB BLD-MCNC: 11.1 G/DL (ref 14–18)
IMM GRANULOCYTES # BLD AUTO: 0.04 X10(3)/MCL (ref 0–0.04)
IMM GRANULOCYTES NFR BLD AUTO: 0.4 %
LDLC SERPL CALC-MCNC: 62 MG/DL (ref 50–140)
LYMPHOCYTES # BLD AUTO: 3 X10(3)/MCL (ref 0.6–4.6)
LYMPHOCYTES NFR BLD AUTO: 33.3 %
MCH RBC QN AUTO: 25 PG (ref 27–31)
MCHC RBC AUTO-ENTMCNC: 32.1 G/DL (ref 33–36)
MCV RBC AUTO: 77.9 FL (ref 80–94)
MONOCYTES # BLD AUTO: 0.77 X10(3)/MCL (ref 0.1–1.3)
MONOCYTES NFR BLD AUTO: 8.5 %
NEUTROPHILS # BLD AUTO: 4.92 X10(3)/MCL (ref 2.1–9.2)
NEUTROPHILS NFR BLD AUTO: 54.7 %
NRBC BLD AUTO-RTO: 0 %
PLATELET # BLD AUTO: 299 X10(3)/MCL (ref 130–400)
PMV BLD AUTO: 10.3 FL (ref 7.4–10.4)
POTASSIUM SERPL-SCNC: 4.6 MMOL/L (ref 3.5–5.1)
PROT SERPL-MCNC: 8 GM/DL (ref 5.8–7.6)
RBC # BLD AUTO: 4.44 X10(6)/MCL (ref 4.7–6.1)
SODIUM SERPL-SCNC: 138 MMOL/L (ref 136–145)
TRIGL SERPL-MCNC: 70 MG/DL (ref 34–140)
VLDLC SERPL CALC-MCNC: 14 MG/DL
WBC # SPEC AUTO: 9.01 X10(3)/MCL (ref 4.5–11.5)

## 2023-08-28 PROCEDURE — 80053 COMPREHEN METABOLIC PANEL: CPT

## 2023-08-28 PROCEDURE — 83036 HEMOGLOBIN GLYCOSYLATED A1C: CPT

## 2023-08-28 PROCEDURE — 80061 LIPID PANEL: CPT

## 2023-08-28 PROCEDURE — 36415 COLL VENOUS BLD VENIPUNCTURE: CPT

## 2023-08-28 PROCEDURE — 85025 COMPLETE CBC W/AUTO DIFF WBC: CPT

## 2023-08-29 ENCOUNTER — OFFICE VISIT (OUTPATIENT)
Dept: SURGERY | Facility: CLINIC | Age: 68
End: 2023-08-29
Payer: MEDICARE

## 2023-08-29 ENCOUNTER — TELEPHONE (OUTPATIENT)
Dept: INTERNAL MEDICINE | Facility: CLINIC | Age: 68
End: 2023-08-29
Payer: MEDICARE

## 2023-08-29 VITALS
HEART RATE: 90 BPM | RESPIRATION RATE: 19 BRPM | HEIGHT: 70 IN | BODY MASS INDEX: 34.79 KG/M2 | TEMPERATURE: 98 F | SYSTOLIC BLOOD PRESSURE: 121 MMHG | DIASTOLIC BLOOD PRESSURE: 82 MMHG | WEIGHT: 243 LBS | OXYGEN SATURATION: 98 %

## 2023-08-29 DIAGNOSIS — Z87.438 HISTORY OF FOURNIER'S GANGRENE: Primary | ICD-10-CM

## 2023-08-29 PROCEDURE — 99214 OFFICE O/P EST MOD 30 MIN: CPT | Mod: PBBFAC

## 2023-08-29 RX ORDER — CLINDAMYCIN HYDROCHLORIDE 300 MG/1
300 CAPSULE ORAL 3 TIMES DAILY
Qty: 21 CAPSULE | Refills: 0 | Status: SHIPPED | OUTPATIENT
Start: 2023-08-29 | End: 2023-09-05

## 2023-08-29 NOTE — TELEPHONE ENCOUNTER
Please contact patient/ daughter to let them know I received notice from  regarding new boil to patient's thigh and recommend he report to walk in clinic or ER for further evaluation/ treatment as necessary. I do not have any available appointments tomorrow or Thursday.     Thank you

## 2023-08-29 NOTE — PROGRESS NOTES
Pt seen by Dr. Marsh. Prescription for antibiotics will be sent to pharmacy. Pt can return to clinic PRN.

## 2023-08-29 NOTE — PROGRESS NOTES
"[facility] General Surgery Clinic Note    HPI:  68M with PMH significant for HTN, HLD, uncontrolled DM type 2, and CAD/mi s/p PCI, and nicotine dependence presents to clinic today for f/u after surgery for Yesi's gangrene May 2023. Pt reports L groin wound healing well, pain minimal. No longer requiring WTD dressing changes. Last saw wound care 2 days ago. Reports new "bump" on the back of his R thigh. Denies fevers, nausea, emesis, abdominal pain, CP, SOB.    PMH:   Past Medical History:   Diagnosis Date    COPD (chronic obstructive pulmonary disease)     Coronary artery disease     Diabetes mellitus     hyperlipidemia     Hypertension       Meds:   Current Outpatient Medications:     albuterol (PROVENTIL/VENTOLIN HFA) 90 mcg/actuation inhaler, Inhale 2 puffs into the lungs as needed for Wheezing or Shortness of Breath., Disp: 18 g, Rfl: 11    aspirin 81 MG Chew, Take 1 tablet (81 mg total) by mouth once daily., Disp: 360 tablet, Rfl: 0    atorvastatin (LIPITOR) 80 MG tablet, Take 1 tablet (80 mg total) by mouth once daily., Disp: 90 tablet, Rfl: 3    clopidogreL (PLAVIX) 75 mg tablet, Take 1 tablet (75 mg total) by mouth once daily., Disp: 90 tablet, Rfl: 3    dulaglutide (TRULICITY) 3 mg/0.5 mL pen injector, Inject 3 mg into the skin every 7 days., Disp: 4 pen, Rfl: 11    famotidine (PEPCID) 40 MG tablet, Take 0.5 tablets (20 mg total) by mouth 2 (two) times daily., Disp: 180 tablet, Rfl: 3    ferrous sulfate 324 mg (65 mg iron) TbEC, Take by mouth 2 (two) times daily., Disp: , Rfl:     finasteride (PROSCAR) 5 mg tablet, Take 1 tablet (5 mg total) by mouth once daily., Disp: 30 tablet, Rfl: 11    fluticasone-salmeterol diskus inhaler 250-50 mcg, Inhale 1 puff into the lungs 2 (two) times daily. Controller, Disp: 60 each, Rfl: 11    folic acid (FOLVITE) 1 MG tablet, Take 1 tablet (1 mg total) by mouth once daily., Disp: 90 tablet, Rfl: 0    glipiZIDE (GLUCOTROL) 10 MG TR24, Take 1 tablet (10 mg total) by mouth daily " with breakfast., Disp: 90 tablet, Rfl: 1    insulin degludec (TRESIBA FLEXTOUCH U-200) 200 unit/mL (3 mL) insulin pen, Inject 58 Units into the skin once daily., Disp: 3 pen , Rfl: 3    lisinopriL (PRINIVIL,ZESTRIL) 20 MG tablet, Take 1 tablet (20 mg total) by mouth once daily., Disp: 90 tablet, Rfl: 0    tamsulosin (FLOMAX) 0.4 mg Cap, Take 1 capsule (0.4 mg total) by mouth 2 (two) times a day., Disp: 60 capsule, Rfl: 11    clindamycin (CLEOCIN) 300 MG capsule, Take 1 capsule (300 mg total) by mouth 3 (three) times daily. for 7 days, Disp: 21 capsule, Rfl: 0    gabapentin (NEURONTIN) 300 MG capsule, Take 1 capsule (300 mg total) by mouth 3 (three) times daily. for 10 days, Disp: 30 capsule, Rfl: 0    metoprolol tartrate (LOPRESSOR) 50 MG tablet, Take 1 tablet (50 mg total) by mouth 2 (two) times daily. (Patient not taking: Reported on 8/1/2023), Disp: 180 tablet, Rfl: 0    sodium bicarbonate 650 MG tablet, Take 1 tablet (650 mg total) by mouth 2 (two) times daily., Disp: 60 tablet, Rfl: 0  Allergies: Review of patient's allergies indicates:  No Known Allergies  Social History:   Social History     Tobacco Use    Smoking status: Every Day     Current packs/day: 0.50     Average packs/day: 0.5 packs/day for 59.3 years (29.7 ttl pk-yrs)     Types: Cigarettes     Start date: 5/8/1964    Smokeless tobacco: Never   Substance Use Topics    Alcohol use: Yes     Comment: beer 2x/month    Drug use: Yes     Types: Marijuana     Family History:   Family History   Problem Relation Age of Onset    Hypertension Mother     Heart failure Mother     Heart attack Mother     Coronary artery disease Mother     Cancer Father     Cancer Sister      Surgical History:   Past Surgical History:   Procedure Laterality Date    CORONARY ANGIOPLASTY      EXCISION, LESION, LOWER EXTREMITY Left 5/25/2023    Procedure: EXCISION, LESION, LOWER EXTREMITY;  Surgeon: South Terry MD;  Location: [facility];  Service: General;  Laterality: Left;     TONSILLECTOMY  07/2018    WOUND DEBRIDEMENT N/A 5/10/2023    Procedure: DEBRIDEMENT, WOUND;  Surgeon: Smith Lay MD;  Location: [facility] OR;  Service: General;  Laterality: N/A;  perineum debridement, lithotomy     Review of Systems:  Skin: No rashes or itching.  Head: Denies headache or recent trauma.  Eyes: Denies eye pain or double vision.  Neck: Denies swelling or hoarseness of voice.  Respiratory: Denies shortness of breath or chest pain  Cardiac: Denies palpitations or swelling in hands/feet.  Gastrointestinal: Denies nausea, denies vomiting.   Urinary: Denies dysuria or hematuria.  Vascular: Denies claudication or leg swelling.  Neuro: Denies motor deficits. Denies weakness.  Endocrine: Denies excessive sweating or cold intolerance.  Psych: Denies memory problems. Denies anxiety.    Objective:    Vitals:  Vitals:    08/29/23 0949   BP: 121/82   Pulse: 90   Resp: 19   Temp: 97.5 °F (36.4 °C)      Physical Exam:  Gen: NAD  Neuro: awake, alert, answering questions appropriately  CV: RRR  Resp: non-labored breathing, LELAND  Abd: soft, ND, NT  : Phimosis  Ext: moves all 4 spontaneously and purposefully  Skin: warm, well perfused; large left thigh and groin wound healing well, no purulence, fluctuance, induration. Soft pink scar formation. R posterior thigh with small abscess, spontaneously draining purulent fluid through punctate opening    Assessment/Plan:  68M with PMH significant for HTN, HLD, uncontrolled DM type 2, and CAD/mi s/p PCI, and nicotine dependence presents to clinic today for f/u after surgery for Yesi's gangrene May 2023. Pt reports L groin wound healing well, pain minimal.    - L thigh wound healing well  - incision and drainage of R posterior thigh wound offered to pt, pt adamantly declining incision and drainage at this time. Risks of worsening infection and possibly developing yesi's gangrene again discussed, pt would like to opt for antibiotic treatment only at this time.   -  clindamycin x7 days prescribed, fu with PCP  - RTC PRN    Annmarie Marsh MD  [employer] General Surgery  08/29/2023 11:29 AM

## 2023-08-29 NOTE — TELEPHONE ENCOUNTER
Patient was seen in Surgery Clinic today.      Provider's note:    L thigh wound healing well  - incision and drainage of R posterior thigh wound offered to pt, pt adamantly declining incision and drainage at this time. Risks of worsening infection and possibly developing yasmin's gangrene again discussed, pt would like to opt for antibiotic treatment only at this time.   - clindamycin x7 days prescribed, fu with PCP  - RTC PRN     FYI: patient is scheduled with you tomorrow. 3 week appointment for fasting lab      I did speak with  informed & surgery clinic visit faxed per request.

## 2023-08-30 ENCOUNTER — OFFICE VISIT (OUTPATIENT)
Dept: INTERNAL MEDICINE | Facility: CLINIC | Age: 68
End: 2023-08-30
Payer: MEDICARE

## 2023-08-30 ENCOUNTER — CLINICAL SUPPORT (OUTPATIENT)
Dept: INTERNAL MEDICINE | Facility: CLINIC | Age: 68
End: 2023-08-30
Attending: NURSE PRACTITIONER
Payer: MEDICARE

## 2023-08-30 VITALS
HEIGHT: 70 IN | RESPIRATION RATE: 20 BRPM | WEIGHT: 258.81 LBS | DIASTOLIC BLOOD PRESSURE: 75 MMHG | BODY MASS INDEX: 37.05 KG/M2 | TEMPERATURE: 98 F | SYSTOLIC BLOOD PRESSURE: 115 MMHG | HEART RATE: 94 BPM

## 2023-08-30 DIAGNOSIS — Z79.4 TYPE 2 DIABETES MELLITUS WITHOUT COMPLICATION, WITH LONG-TERM CURRENT USE OF INSULIN: ICD-10-CM

## 2023-08-30 DIAGNOSIS — L02.91 ABSCESS: ICD-10-CM

## 2023-08-30 DIAGNOSIS — I10 PRIMARY HYPERTENSION: ICD-10-CM

## 2023-08-30 DIAGNOSIS — I25.10 CORONARY ARTERY DISEASE, UNSPECIFIED VESSEL OR LESION TYPE, UNSPECIFIED WHETHER ANGINA PRESENT, UNSPECIFIED WHETHER NATIVE OR TRANSPLANTED HEART: ICD-10-CM

## 2023-08-30 DIAGNOSIS — E11.40 TYPE 2 DIABETES MELLITUS WITH DIABETIC NEUROPATHY, WITHOUT LONG-TERM CURRENT USE OF INSULIN: Primary | ICD-10-CM

## 2023-08-30 DIAGNOSIS — N49.3 FOURNIER'S GANGRENE OF SCROTUM: ICD-10-CM

## 2023-08-30 DIAGNOSIS — E78.5 HYPERLIPIDEMIA LDL GOAL <70: ICD-10-CM

## 2023-08-30 DIAGNOSIS — E11.40 TYPE 2 DIABETES MELLITUS WITH DIABETIC NEUROPATHY, WITHOUT LONG-TERM CURRENT USE OF INSULIN: ICD-10-CM

## 2023-08-30 DIAGNOSIS — F17.219 CIGARETTE NICOTINE DEPENDENCE WITH NICOTINE-INDUCED DISORDER: ICD-10-CM

## 2023-08-30 DIAGNOSIS — E11.9 TYPE 2 DIABETES MELLITUS WITHOUT COMPLICATION, WITH LONG-TERM CURRENT USE OF INSULIN: ICD-10-CM

## 2023-08-30 DIAGNOSIS — F17.210 CIGARETTE SMOKER: ICD-10-CM

## 2023-08-30 DIAGNOSIS — Z12.11 COLON CANCER SCREENING: ICD-10-CM

## 2023-08-30 PROBLEM — Z72.0 TOBACCO ABUSE: Status: RESOLVED | Noted: 2022-06-13 | Resolved: 2023-08-30

## 2023-08-30 PROCEDURE — 99215 OFFICE O/P EST HI 40 MIN: CPT | Mod: PBBFAC | Performed by: NURSE PRACTITIONER

## 2023-08-30 PROCEDURE — 99214 PR OFFICE/OUTPT VISIT, EST, LEVL IV, 30-39 MIN: ICD-10-PCS | Mod: S$PBB,,, | Performed by: NURSE PRACTITIONER

## 2023-08-30 PROCEDURE — 99214 OFFICE O/P EST MOD 30 MIN: CPT | Mod: S$PBB,,, | Performed by: NURSE PRACTITIONER

## 2023-08-30 RX ORDER — GABAPENTIN 300 MG/1
300 CAPSULE ORAL 3 TIMES DAILY PRN
Qty: 30 CAPSULE | Refills: 1 | Status: SHIPPED | OUTPATIENT
Start: 2023-08-30

## 2023-08-30 NOTE — PROGRESS NOTES
Cammy L Sammy, NP   [facility]  [address] [facility] [address]      PATIENT NAME: John Addison  : 1955  DATE: 23  MRN: [id number]        Reason for Visit / Chief Complaint: Results       History of Present Illness / Problem Focused Workflow     John Addison presents to the clinic with Results     22: 66 yo AAM for routine follow up presenting unaccompanied. Last visit 22. PMH includes HTN, HLD, CAD/NSTEMI (2016) s/p PCI of left circumflex, inferior wall STEMI s/p PTCA/BHAKTI of distal RCA 2017, type 2 DM, elevated LFTs, lymphadenopathy, tobacco abuse, and morbid obesity. He did not bring medications for visit. Has home health to check blood sugars. He is under care of [facility]. /84. He refuses colonoscopy however willing to complete stool test. Otherwise, denies any other concerns/ complaints, CP, SOB, HA, dizziness, abdominal pain, poor appetite, n/v/d, bloody stools.      23: 67 yo AAM for follow up. Patient presented to ER 5/10/23 for c/o groin pain and drainage from scrotum with diagnosis of yasmin's gangrene/ sepsis. Surgical debridement 5/10/23. Anaerobic culture significant for prevotella species and tissue culture significant for Enterococcus faecalis. He was sent to [facility]. He had left thigh abscess for which he underwent I&D for as well. He was discharged from [facility] 23. He has HH daily and caring for wound. He states he is being told it is healing well. He denies any fever, chills, weakness, body aches, poor appetite. Last A1c 9.7% 2023. He is compliant with Glipizide, Tresiba and Trulicity. He is not taking Levemir. He does not administer his insulin and HH nurse goes once a day. He was being followed by [facility] endo however last visit 2022. He is in need of f/u with GI, Cardiology, and Endo. No other concerns at this time.     8/30/23: Pt for follow up. /75. He was seen by  surgery clinic yesterday and evaluated for new right thigh abscess. He states he refused I&D. He is on antibiotics. He states  nurse coming daily for wound care/ checks. Denies any fever, chills, weakness, numbness, CP, SOB, abdominal pain, poor appetite, n/v/d.      Other providers:  [facility] GI  [facility] Cardiology  [facility] Pulmonology- last visit 2/2/21  [facility] ENT   Dr. Romero/ Dr. Juarez Podiatry  [facility] Endo  Dr. Smith Lay- General Surgeon      Review of Systems     Review of Systems   Constitutional:  Negative for appetite change, chills, fatigue, fever and unexpected weight change.   HENT:  Negative for congestion, ear pain, hearing loss, sinus pressure, sore throat and tinnitus.    Respiratory:  Negative for cough, chest tightness, shortness of breath and wheezing.    Cardiovascular:  Negative for chest pain, palpitations and leg swelling.   Gastrointestinal:  Negative for abdominal distention, abdominal pain, blood in stool, constipation, diarrhea, nausea and vomiting.   Genitourinary:  Negative for dysuria and hematuria.   Musculoskeletal:  Negative for arthralgias and myalgias.   Skin:  Positive for wound (right thigh abscess). Negative for pallor.   Allergic/Immunologic: Negative for environmental allergies.   Neurological:  Negative for dizziness, syncope, weakness, numbness and headaches.   Hematological:  Negative for adenopathy. Does not bruise/bleed easily.   Psychiatric/Behavioral:  Negative for agitation, behavioral problems, confusion, hallucinations, sleep disturbance and suicidal ideas. The patient is not nervous/anxious.        Medical / Social / Family History     ----------------------------  COPD (chronic obstructive pulmonary disease)  Coronary artery disease  Diabetes mellitus  hyperlipidemia  Hypertension     Past Surgical History:   Procedure Laterality Date    CORONARY ANGIOPLASTY      EXCISION, LESION, LOWER EXTREMITY Left 5/25/2023    Procedure: EXCISION, LESION, LOWER EXTREMITY;   Surgeon: South Terry MD;  Location: [facility] OR;  Service: General;  Laterality: Left;    TONSILLECTOMY  07/2018    WOUND DEBRIDEMENT N/A 5/10/2023    Procedure: DEBRIDEMENT, WOUND;  Surgeon: Smith Lay MD;  Location: [facility] OR;  Service: General;  Laterality: N/A;  perineum debridement, lithotomy       Social History     Socioeconomic History    Marital status: Single   Tobacco Use    Smoking status: Every Day     Current packs/day: 0.50     Average packs/day: 0.5 packs/day for 59.3 years (29.7 ttl pk-yrs)     Types: Cigarettes     Start date: 5/8/1964    Smokeless tobacco: Never   Substance and Sexual Activity    Alcohol use: Yes     Comment: beer 2x/month    Drug use: Yes     Types: Marijuana    Sexual activity: Not Currently     Social Determinants of Health     Financial Resource Strain: Low Risk  (5/19/2023)    Overall Financial Resource Strain (CARDIA)     Difficulty of Paying Living Expenses: Not hard at all   Food Insecurity: No Food Insecurity (5/19/2023)    Hunger Vital Sign     Worried About Running Out of Food in the Last Year: Never true     Ran Out of Food in the Last Year: Never true   Transportation Needs: No Transportation Needs (5/19/2023)    PRAPARE - Transportation     Lack of Transportation (Medical): No     Lack of Transportation (Non-Medical): No   Physical Activity: Inactive (5/19/2023)    Exercise Vital Sign     Days of Exercise per Week: 0 days     Minutes of Exercise per Session: 0 min   Stress: No Stress Concern Present (5/19/2023)    [employer] [place] [employer] - Occupational Stress Questionnaire     Feeling of Stress : Not at all   Social Connections: Moderately Isolated (6/16/2023)    Social Connection and Isolation Panel [NHANES]     Frequency of Communication with Friends and Family: More than three times a week     Frequency of Social Gatherings with Friends and Family: Three times a week     Attends Orthodox Services: More than 4 times per year     Active  "Member of Clubs or Organizations: No     Attends Club or Organization Meetings: Never     Marital Status:    Housing Stability: Low Risk  (5/19/2023)    Housing Stability Vital Sign     Unable to Pay for Housing in the Last Year: No     Number of Places Lived in the Last Year: 1     Unstable Housing in the Last Year: No        Family History   Problem Relation Age of Onset    Hypertension Mother     Heart failure Mother     Heart attack Mother     Coronary artery disease Mother     Cancer Father     Cancer Sister         Medications and Allergies     Medications  Current Outpatient Medications   Medication Instructions    albuterol (PROVENTIL/VENTOLIN HFA) 90 mcg/actuation inhaler 2 puffs, Inhalation, As needed (PRN)    aspirin 81 mg, Oral, Daily    atorvastatin (LIPITOR) 80 mg, Oral, Daily    clindamycin (CLEOCIN) 300 mg, Oral, 3 times daily    clopidogreL (PLAVIX) 75 mg, Oral, Daily    dulaglutide (TRULICITY) 3 mg, Subcutaneous, Every 7 days    famotidine (PEPCID) 20 mg, Oral, 2 times daily    ferrous sulfate 324 mg (65 mg iron) TbEC Oral, 2 times daily    finasteride (PROSCAR) 5 mg, Oral, Daily    fluticasone-salmeterol diskus inhaler 250-50 mcg 1 puff, Inhalation, 2 times daily, Controller    folic acid (FOLVITE) 1 mg, Oral, Daily    gabapentin (NEURONTIN) 300 mg, Oral, 3 times daily PRN    glipiZIDE (GLUCOTROL) 10 mg, Oral, With breakfast    insulin degludec (TRESIBA FLEXTOUCH U-200) 58 Units, Subcutaneous, Daily    lisinopriL (PRINIVIL,ZESTRIL) 20 mg, Oral, Daily    metoprolol tartrate (LOPRESSOR) 50 mg, Oral, 2 times daily    sodium bicarbonate 650 mg, Oral, 2 times daily    tamsulosin (FLOMAX) 0.4 mg, Oral, 2 times daily       Allergies  Review of patient's allergies indicates:  No Known Allergies    Physical Examination     Visit Vitals  /75 (BP Location: Left arm, Patient Position: Sitting, BP Method: Large (Automatic))   Pulse 94   Temp 97.5 °F (36.4 °C) (Oral)   Resp 20   Ht 5' 10" (1.778 " m)   Wt 117.4 kg (258 lb 12.8 oz)   BMI 37.13 kg/m²       Physical Exam  Vitals and nursing note reviewed.   Constitutional:       Appearance: Normal appearance. He is not ill-appearing.   HENT:      Head: Normocephalic.   Cardiovascular:      Rate and Rhythm: Normal rate and regular rhythm.      Pulses: Normal pulses.   Pulmonary:      Effort: Pulmonary effort is normal. No respiratory distress.      Breath sounds: Normal breath sounds.   Musculoskeletal:         General: Normal range of motion.      Cervical back: Normal range of motion and neck supple.      Right lower leg: No edema.      Left lower leg: No edema.   Skin:     General: Skin is warm and dry.      Capillary Refill: Capillary refill takes less than 2 seconds.      Comments: Abscess covered    Neurological:      Mental Status: He is alert and oriented to person, place, and time. Mental status is at baseline.      Motor: No weakness.   Psychiatric:         Mood and Affect: Mood normal.         Behavior: Behavior normal.         Thought Content: Thought content normal.         Judgment: Judgment normal.           Results     Lab Results   Component Value Date    WBC 9.01 08/28/2023    RBC 4.44 (L) 08/28/2023    HGB 11.1 (L) 08/28/2023    HCT 34.6 (L) 08/28/2023    MCV 77.9 (L) 08/28/2023    MCH 25.0 (L) 08/28/2023    MCHC 32.1 (L) 08/28/2023    RDW 17.6 (H) 08/28/2023     08/28/2023    MPV 10.3 08/28/2023     Sodium Level   Date Value Ref Range Status   08/28/2023 138 136 - 145 mmol/L Final     Potassium Level   Date Value Ref Range Status   08/28/2023 4.6 3.5 - 5.1 mmol/L Final     Carbon Dioxide   Date Value Ref Range Status   08/28/2023 28 23 - 31 mmol/L Final     Blood Urea Nitrogen   Date Value Ref Range Status   08/28/2023 20.2 8.4 - 25.7 mg/dL Final     Creatinine   Date Value Ref Range Status   08/28/2023 1.04 0.73 - 1.18 mg/dL Final     Calcium Level Total   Date Value Ref Range Status   08/28/2023 9.7 8.8 - 10.0 mg/dL Final     Albumin  "Level   Date Value Ref Range Status   08/28/2023 3.2 (L) 3.4 - 4.8 g/dL Final     Bilirubin Total   Date Value Ref Range Status   08/28/2023 0.2 <=1.5 mg/dL Final     Alkaline Phosphatase   Date Value Ref Range Status   08/28/2023 176 (H) 40 - 150 unit/L Final     Aspartate Aminotransferase   Date Value Ref Range Status   08/28/2023 43 (H) 5 - 34 unit/L Final     Alanine Aminotransferase   Date Value Ref Range Status   08/28/2023 55 0 - 55 unit/L Final     Estimated GFR-Non    Date Value Ref Range Status   06/26/2018 65 (L) >>=90 mL/min Final     Lab Results   Component Value Date    CHOL 110 08/28/2023     Lab Results   Component Value Date    HDL 34 (L) 08/28/2023     No results found for: "LDLCALC"  Lab Results   Component Value Date    TRIG 70 08/28/2023     No results found for: "CHOLHDL"  No results found for: "TSH"  Lab Results   Component Value Date    PHUR 6.0 07/17/2023    SPECGRAV 1.030 07/17/2023    PROTEINUA Negative 06/16/2023    KETONESU neg 07/17/2023    NITRITE pos 07/17/2023    LEUKOCYTESUR Negative 06/16/2023     Lab Results   Component Value Date    HGBA1C 9.4 (H) 08/28/2023    HGBA1C 9.7 (H) 05/10/2023    HGBA1C 12.1 (H) 06/26/2018     No results found for: "MICROALBUR", "[id number]"   Results for orders placed in visit on 08/22/22    Diabetic Eye Screening Photo         Assessment       ICD-10-CM ICD-9-CM   1. Type 2 diabetes mellitus with diabetic neuropathy, without long-term current use of insulin  E11.40 250.60     357.2   2. Colon cancer screening  Z12.11 V76.51   3. Primary hypertension  I10 401.9   4. Hyperlipidemia LDL goal <70  E78.5 272.4   5. Coronary artery disease, unspecified vessel or lesion type, unspecified whether angina present, unspecified whether native or transplanted heart  I25.10 414.00   6. Yesi's gangrene of scrotum  N49.3 608.83   7. Type 2 diabetes mellitus without complication, with long-term current use of insulin  E11.9 250.00    Z79.4 V58.67 "   8. Cigarette nicotine dependence with nicotine-induced disorder  F17.219 292.9   9. Abscess  L02.91 682.9       Plan       1. Type 2 diabetes mellitus with diabetic neuropathy, without long-term current use of insulin  A1c 9.4%   CBG from HH report with avg  > 200 and noted > 400   Patient admits he eats a lot of rice.  He admits he eats things he should not.  Reviewed ADA diet with patient today discussed portion control and to limit carbohydrates and choose complex carbs.  Referral to diabetes education ordered.  DM eye exam: Fundus photos today  DM foot exam: 8/1/23  He is not taking Levemir BID as prescribed-  nurse administering insulin and goes once daily only. Pt refuses to administer to self.   Metformin was stopped during hospitalization s/t metabolic acidosis  Continue medications  F/u 4 weeks   - Diabetic Eye Screening Photo; Future  - gabapentin (NEURONTIN) 300 MG capsule; Take 1 capsule (300 mg total) by mouth 3 (three) times daily as needed (pain).  Dispense: 30 capsule; Refill: 1    2. Colon cancer screening  - Cologuard Screening (Multitarget Stool DNA); Future  - Cologuard Screening (Multitarget Stool DNA)    3. Primary hypertension  /75  Follow low sodium diet, < 2 gm/day (avoid high salty foods such as processed meats/ sausage/hurtado/ sandwich meat, chips, pickles, cheese, crackers and soft drinks/ electrolyte replacement drinks).  Avoid tobacco/ alcohol use  Educated on health benefits of at least 5 days/ week of 30 minutes moderate intensity exercise (brisk walking) and 2 or more days/ week of muscle strength activities  Daily ASA 81 mg for CV prevention  Continue current medication regimen      4. Hyperlipidemia LDL goal <70  LDL 62, Trig 70, HLD 34, Total 110   Avoid tobacco/ alcohol  Follow low fat/low cholesterol diet such as avoid/ decrease hurtado, sausage, fried foods, cookies, cakes, chips, cheese, whole milk, butter, mayonnaise. Add olive oil, avocados, lean meats, fresh fruits/  vegetables, heart healthy nuts to diet.  Educated on health benefits of exercise 5 days/ week of at least 30 minutes moderate intensity exercise (brisk walking) and 2 days/ week of muscle strength activities  Daily ASA 81 mg for CV prevention  Continue current medication regimen      5. Coronary artery disease, unspecified vessel or lesion type, unspecified whether angina present, unspecified whether native or transplanted heart  Pt to keep upcoming appt with Cardiology  Avoid tobacco/ alcohol.  Regular exercise as tolerated.  Continue medications as prescribed, anticoagulation as prescribed, keep f/u with Cardiologist.  ER precautions for CP, SOB, palpitations, dizziness, syncope, weakness.       6. Yesi's gangrene of scrotum  Pt evaluated by surgery clinic yesterday; see notes. Continue HH nurse wound check/ care.     7. Type 2 diabetes mellitus without complication, with long-term current use of insulin  See 1    8. Cigarette nicotine dependence with nicotine-induced disorder  0.5 ppd  Discussed for at least 3 minutes importance of smoking cessation and health benefits, including adverse effects to patient's health and organs.  Encouraged cessation.  Referral for LDCT      9. Abscess  Patient with new onset of right thigh abscess and evaluated by surgery clinic yesterday. He is on clindamycin; encouraged to complete all of antibiotics. Encouraged continued HH nurse/ wound check daily and monitoring for any worsening s/s.       Future Appointments   Date Time Provider Department Center   9/12/2023 11:00 AM Fuad Medley MD [facility] CARD [facility]   9/27/2023  8:45 AM Cammy Christianson NP [facility] INTMED [place]    10/9/2023 10:30 AM Tamica Blackwell FNP [facility] GASTRO [place] [facility]        Follow up in about 4 weeks (around 9/27/2023).    Signature:  Cammy Christianson NP  [facility]  [address] [facility] [address]    Date of encounter: 8/30/23

## 2023-09-01 NOTE — PROGRESS NOTES
John Addison is a 68 y.o. male here for a diabetic eye screening with non-dilated fundus photos per KYREE Mcknight.    Patient cooperative?: Yes  Small pupils?: Yes  Last eye exam: unknown    Images were low quality and inadequate for interpretation. Patient was referred to Opthalmology

## 2023-09-06 ENCOUNTER — DOCUMENT SCAN (OUTPATIENT)
Dept: HOME HEALTH SERVICES | Facility: HOSPITAL | Age: 68
End: 2023-09-06
Payer: MEDICARE

## 2023-09-07 ENCOUNTER — TELEPHONE (OUTPATIENT)
Dept: INTERNAL MEDICINE | Facility: CLINIC | Age: 68
End: 2023-09-07
Payer: MEDICARE

## 2023-09-07 NOTE — TELEPHONE ENCOUNTER
Please send [facility] surgery clinic note from 8/29/23 to  as requested.     Thank you

## 2023-09-12 ENCOUNTER — OFFICE VISIT (OUTPATIENT)
Dept: CARDIOLOGY | Facility: CLINIC | Age: 68
End: 2023-09-12
Payer: MEDICARE

## 2023-09-12 VITALS
RESPIRATION RATE: 20 BRPM | TEMPERATURE: 98 F | HEIGHT: 70 IN | OXYGEN SATURATION: 93 % | SYSTOLIC BLOOD PRESSURE: 103 MMHG | BODY MASS INDEX: 36.68 KG/M2 | DIASTOLIC BLOOD PRESSURE: 68 MMHG | WEIGHT: 256.19 LBS | HEART RATE: 65 BPM

## 2023-09-12 DIAGNOSIS — I10 PRIMARY HYPERTENSION: ICD-10-CM

## 2023-09-12 DIAGNOSIS — E78.5 HYPERLIPIDEMIA LDL GOAL <70: ICD-10-CM

## 2023-09-12 DIAGNOSIS — I25.10 CORONARY ARTERY DISEASE, UNSPECIFIED VESSEL OR LESION TYPE, UNSPECIFIED WHETHER ANGINA PRESENT, UNSPECIFIED WHETHER NATIVE OR TRANSPLANTED HEART: Primary | ICD-10-CM

## 2023-09-12 PROCEDURE — 99215 OFFICE O/P EST HI 40 MIN: CPT | Mod: PBBFAC | Performed by: INTERNAL MEDICINE

## 2023-09-12 RX ORDER — DOCUSATE SODIUM 100 MG/1
100 CAPSULE, LIQUID FILLED ORAL
COMMUNITY

## 2023-09-12 NOTE — PROGRESS NOTES
[facility]   Cardiology Clinic - Follow Up     Date of Visit: 9/12/2023  Reason for Visit/Chief Complaint:   Chief Complaint    f/u denies chest pain has had SOB no questions           History of Present Illness:      John Addison is a 68 y.o. male with a PMH significant for CAD/NSTEMI 2/2016 s/p PCI of left circumflex, inferior wall STEMI s/p PTCA/BHAKTI of distal RCA in 9/2017, hypertension, hypercholesterolemia, diabetes, and chronic tobacco who presents to cardiology clinic for follow up. Patient reports he is doing well today. Has some mild SOB but it is at baseline. Denies all cardiac complaints. Continues to have some leg pain which is unchanged from baseline.         Past Medical History:        Past Medical History:   Diagnosis Date    COPD (chronic obstructive pulmonary disease)     Coronary artery disease     Diabetes mellitus     hyperlipidemia     Hypertension        Surgical History:        Past Surgical History:   Procedure Laterality Date    CORONARY ANGIOPLASTY      EXCISION, LESION, LOWER EXTREMITY Left 5/25/2023    Procedure: EXCISION, LESION, LOWER EXTREMITY;  Surgeon: South Terry MD;  Location: [facility];  Service: General;  Laterality: Left;    TONSILLECTOMY  07/2018    WOUND DEBRIDEMENT N/A 5/10/2023    Procedure: DEBRIDEMENT, WOUND;  Surgeon: Smith Lay MD;  Location: [facility];  Service: General;  Laterality: N/A;  perineum debridement, lithotomy       Family History:        Family History   Problem Relation Age of Onset    Hypertension Mother     Heart failure Mother     Heart attack Mother     Coronary artery disease Mother     Cancer Father     Cancer Sister        Social History:        Social History     Tobacco Use    Smoking status: Every Day     Current packs/day: 0.50     Average packs/day: 0.5 packs/day for 59.3 years (29.7 ttl pk-yrs)     Types: Cigarettes     Start date: 5/8/1964    Smokeless tobacco: Never   Substance Use Topics    Alcohol  use: Yes     Comment: beer 2x/month    Drug use: Yes     Types: Marijuana       Allergies:       Review of patient's allergies indicates:  No Known Allergies    Medications:        Current Outpatient Medications   Medication Sig Dispense Refill    albuterol (PROVENTIL/VENTOLIN HFA) 90 mcg/actuation inhaler Inhale 2 puffs into the lungs as needed for Wheezing or Shortness of Breath. 18 g 11    aspirin 81 MG Chew Take 1 tablet (81 mg total) by mouth once daily. 360 tablet 0    atorvastatin (LIPITOR) 80 MG tablet Take 1 tablet (80 mg total) by mouth once daily. 90 tablet 3    clopidogreL (PLAVIX) 75 mg tablet Take 1 tablet (75 mg total) by mouth once daily. 90 tablet 3    docusate sodium (COLACE) 100 MG capsule Take 100 mg by mouth as needed for Constipation.      dulaglutide (TRULICITY) 3 mg/0.5 mL pen injector Inject 3 mg into the skin every 7 days. 4 pen 11    famotidine (PEPCID) 40 MG tablet Take 0.5 tablets (20 mg total) by mouth 2 (two) times daily. 180 tablet 3    finasteride (PROSCAR) 5 mg tablet Take 1 tablet (5 mg total) by mouth once daily. 30 tablet 11    fluticasone-salmeterol diskus inhaler 250-50 mcg Inhale 1 puff into the lungs 2 (two) times daily. Controller 60 each 11    folic acid (FOLVITE) 1 MG tablet Take 1 tablet (1 mg total) by mouth once daily. 90 tablet 0    gabapentin (NEURONTIN) 300 MG capsule Take 1 capsule (300 mg total) by mouth 3 (three) times daily as needed (pain). 30 capsule 1    glipiZIDE (GLUCOTROL) 10 MG TR24 Take 1 tablet (10 mg total) by mouth daily with breakfast. 90 tablet 1    insulin degludec (TRESIBA FLEXTOUCH U-200) 200 unit/mL (3 mL) insulin pen Inject 58 Units into the skin once daily. 3 pen 3    lisinopriL (PRINIVIL,ZESTRIL) 20 MG tablet Take 1 tablet (20 mg total) by mouth once daily. 90 tablet 0    metoprolol tartrate (LOPRESSOR) 50 MG tablet Take 1 tablet (50 mg total) by mouth 2 (two) times daily. 180 tablet 0    tamsulosin (FLOMAX) 0.4 mg Cap Take 1 capsule (0.4 mg  "total) by mouth 2 (two) times a day. 60 capsule 11    ferrous sulfate 324 mg (65 mg iron) TbEC Take by mouth 2 (two) times daily.      sodium bicarbonate 650 MG tablet Take 1 tablet (650 mg total) by mouth 2 (two) times daily. 60 tablet 0     No current facility-administered medications for this visit.       I have reviewed and updated the patient's medications, allergies, past medical history, surgical history, social history and family history as needed.    Review of Systems:      Review of Systems   Constitutional:  Negative for chills, diaphoresis and fever.   HENT:  Negative for hearing loss and nosebleeds.    Eyes:  Negative for blurred vision.   Respiratory:  Negative for shortness of breath.    Cardiovascular:  Negative for chest pain, palpitations, orthopnea, claudication and PND.   Gastrointestinal:  Negative for nausea and vomiting.   Genitourinary:  Negative for dysuria.   Musculoskeletal:  Negative for myalgias.   Skin:  Negative for rash.   Neurological:  Negative for dizziness and headaches.       Objective:        Vitals:    09/12/23 1107   BP: 103/68   Pulse: 65   Resp: 20   Temp: 98.1 °F (36.7 °C)     Wt Readings from Last 3 Encounters:   09/12/23 116.2 kg (256 lb 2.8 oz)   08/30/23 117.4 kg (258 lb 12.8 oz)   08/29/23 110.2 kg (243 lb)     Temp Readings from Last 3 Encounters:   09/12/23 98.1 °F (36.7 °C) (Oral)   08/30/23 97.5 °F (36.4 °C) (Oral)   08/29/23 97.5 °F (36.4 °C) (Oral)     BP Readings from Last 3 Encounters:   09/12/23 103/68   08/30/23 115/75   08/29/23 121/82     Pulse Readings from Last 3 Encounters:   09/12/23 65   08/30/23 94   08/29/23 90       Vitals:    09/12/23 1107   BP: 103/68   BP Location: Left arm   Patient Position: Sitting   BP Method: Medium (Automatic)   Pulse: 65   Resp: 20   Temp: 98.1 °F (36.7 °C)   TempSrc: Oral   SpO2: (!) 93%   Weight: 116.2 kg (256 lb 2.8 oz)   Height: 5' 10" (1.778 m)     Body mass index is 36.76 kg/m².    Physical Exam  Constitutional:      "  Appearance: Normal appearance.   HENT:      Head: Normocephalic and atraumatic.   Eyes:      Conjunctiva/sclera: Conjunctivae normal.   Neck:      Vascular: No carotid bruit.   Cardiovascular:      Rate and Rhythm: Normal rate and regular rhythm.      Pulses: Normal pulses.      Heart sounds: Normal heart sounds.   Pulmonary:      Effort: Pulmonary effort is normal.      Breath sounds: Normal breath sounds.   Abdominal:      General: Bowel sounds are normal.      Palpations: Abdomen is soft.   Musculoskeletal:      Right lower leg: No edema.      Left lower leg: No edema.   Skin:     General: Skin is warm and dry.      Findings: No rash.   Neurological:      Mental Status: He is alert. Mental status is at baseline.   Psychiatric:         Mood and Affect: Mood normal.         Thought Content: Thought content normal.         Judgment: Judgment normal.          Labs:      I have reviewed the following labs below:      CBC:  Lab Results   Component Value Date    WBC 9.01 08/28/2023    HGB 11.1 (L) 08/28/2023    HCT 34.6 (L) 08/28/2023     08/28/2023    MCV 77.9 (L) 08/28/2023    RDW 17.6 (H) 08/28/2023     BMP:  Lab Results   Component Value Date     08/28/2023    K 4.6 08/28/2023    CO2 28 08/28/2023    BUN 20.2 08/28/2023    CALCIUM 9.7 08/28/2023    MG 1.80 07/03/2023    PHOS 2.9 07/03/2023     LFTs:  Lab Results   Component Value Date    ALBUMIN 3.2 (L) 08/28/2023    BILITOT 0.2 08/28/2023    AST 43 (H) 08/28/2023    ALKPHOS 176 (H) 08/28/2023    ALT 55 08/28/2023     FLP:  Cholesterol Total   Date Value Ref Range Status   08/28/2023 110 <=200 mg/dL Final     HDL Cholesterol   Date Value Ref Range Status   08/28/2023 34 (L) 35 - 60 mg/dL Final     LDL Cholesterol   Date Value Ref Range Status   08/28/2023 62.00 50.00 - 140.00 mg/dL Final     Triglyceride   Date Value Ref Range Status   08/28/2023 70 34 - 140 mg/dL Final     DM:  Lab Results   Component Value Date    HGBA1C 9.4 (H) 08/28/2023    HGBA1C  9.7 (H) 05/10/2023    HGBA1C 12.1 (H) 06/26/2018    CREATININE 1.04 08/28/2023     Anemia:  Lab Results   Component Value Date    IRON 37 (L) 06/16/2023    TIBC 244 (L) 06/16/2023    FERRITIN 82.51 06/16/2023     Coags:  Lab Results   Component Value Date    INR 1.07 06/25/2018    PROTIME 13.2 06/25/2018      Cardiac:  Lab Results   Component Value Date    TROPONINI 0.013 08/15/2022    TROPONINI 0.016 08/15/2022    BNP 23.9 05/10/2023       Cardiac Studies/Imaging:        I have reviewed the following studies below:      Lexiscan stress test October 2018:  ECG portion of stress test is negative for ischemia by diagnostic criteria.  Negative for ischemia. Normal myocardial perfusion study on stress imaging. Normal LVEF 57%.  Recommendation:  Recommend medical management of coronary disease if clinically indicated.    SANIA testing November 2018:  Normal resting SANIA  Normal stress Doppler-study limited by patient's complaint of shortness of breath    Echocardiogram September 20, 2017:  Ejection fraction is visually estimated at 55%  Mild mitral regurgitation  No evidence of pericardial effusion      Assessment & Plan:      68 y.o. male with the following medical problems:    CAD (coronary artery disease)  NSTEMI s/p PCI left circumflex in 2/2016  STEMI s/p PTCA/BHAKTI distal RCA on 9/2017   Lexiscan Stress Test October 2018 - negative for ischemia  Denies any CP, heaviness, tightness, dizziness, and syncope. Reports stable BRYAN  Continue baby aspirin, plavix, lisinopril, metoprolol, and atorvastatin   Counseled on lifestyle modifications with diet, exercise, and smoking cessation        Left leg pain  Reports intermittent pain with ambulation. Also has sharp shooting pain at rest that appears to be related to neuropathy  Normal resting SANIA 2018  Repeat SANIA      HTN (hypertension)  Controlled 103/68  continue current medical management  Counseled on a low sodium, low cholesterol diet    HLD (hyperlipidemia)  LDL at goal - 50  2/2022  Continue Atorvastatin 80mg by mouth daily  Counseled patient on low-cholesterol, low-fat diet, and encourage exercise as tolerated.      Diabetes  Uncontrolled - last A1c 9.4  Management per PCP    Tobacco Abuse  Continues to smoke approximately half a pack to one pack a day. Patient reports he is not ready to quit at this time but will try to cut down further  Counseled on smoking cessation and will revisit at next visit.    Return to clinic in 6 months.      Future Appointments   Date Time Provider Department Center   9/19/2023  8:15 AM [facility] CT2 500 LB LIMIT [facility] CTSCN [place]    9/27/2023  8:45 AM Cammy Christianson, NP [facility] INTMED [place]    10/9/2023 10:30 AM Tamica Blackwell, HOLAP [facility] GASTRO [place]         Katie Joseph DO  [facility] Cardiology Fellow, PGY-6  09/12/2023 11:43 AM

## 2023-09-14 ENCOUNTER — TELEPHONE (OUTPATIENT)
Dept: INTERNAL MEDICINE | Facility: CLINIC | Age: 68
End: 2023-09-14
Payer: MEDICARE

## 2023-09-14 NOTE — TELEPHONE ENCOUNTER
Please advise pt HH note received with concern for abscess formation to thigh wound. Recommend pt to report to ER for evaluation/ treatment please.     Thank you

## 2023-09-15 ENCOUNTER — EXTERNAL HOME HEALTH (OUTPATIENT)
Dept: HOME HEALTH SERVICES | Facility: HOSPITAL | Age: 68
End: 2023-09-15
Payer: MEDICARE

## 2023-09-19 ENCOUNTER — TELEPHONE (OUTPATIENT)
Dept: INTERNAL MEDICINE | Facility: CLINIC | Age: 68
End: 2023-09-19
Payer: MEDICARE

## 2023-09-19 NOTE — TELEPHONE ENCOUNTER
Called daughter stated he takes it twice a day but some times refuses his shots so that's why his sugar was high. Reasoning for refusal was that his fingers hurt.

## 2023-09-19 NOTE — TELEPHONE ENCOUNTER
Please contact pt and verify if he is taking metoprolol as prescribed? It is noted per HH VS that pt's pulse elevated on numerous occasions. Also blood sugars are high. Encourage compliance with medications and to follow diabetic diet please.     Thank you

## 2023-09-21 ENCOUNTER — HOSPITAL ENCOUNTER (OUTPATIENT)
Dept: RADIOLOGY | Facility: HOSPITAL | Age: 68
Discharge: HOME | End: 2023-09-21
Attending: NURSE PRACTITIONER
Payer: MEDICARE

## 2023-09-21 DIAGNOSIS — F17.210 CIGARETTE SMOKER: ICD-10-CM

## 2023-09-21 PROCEDURE — 71271 CT THORAX LUNG CANCER SCR C-: CPT | Mod: TC

## 2023-09-27 ENCOUNTER — DOCUMENT SCAN (OUTPATIENT)
Dept: HOME HEALTH SERVICES | Facility: HOSPITAL | Age: 68
End: 2023-09-27
Payer: MEDICARE

## 2023-09-27 ENCOUNTER — OFFICE VISIT (OUTPATIENT)
Dept: INTERNAL MEDICINE | Facility: CLINIC | Age: 68
End: 2023-09-27
Payer: MEDICARE

## 2023-09-27 VITALS
HEART RATE: 93 BPM | SYSTOLIC BLOOD PRESSURE: 100 MMHG | BODY MASS INDEX: 36.48 KG/M2 | WEIGHT: 254.81 LBS | RESPIRATION RATE: 20 BRPM | HEIGHT: 70 IN | DIASTOLIC BLOOD PRESSURE: 60 MMHG | TEMPERATURE: 98 F

## 2023-09-27 DIAGNOSIS — F17.219 CIGARETTE NICOTINE DEPENDENCE WITH NICOTINE-INDUCED DISORDER: ICD-10-CM

## 2023-09-27 DIAGNOSIS — I73.9 PERIPHERAL ARTERY DISEASE: ICD-10-CM

## 2023-09-27 DIAGNOSIS — E66.01 SEVERE OBESITY (BMI 35.0-39.9) WITH COMORBIDITY: ICD-10-CM

## 2023-09-27 DIAGNOSIS — E11.40 TYPE 2 DIABETES MELLITUS WITH DIABETIC NEUROPATHY, WITHOUT LONG-TERM CURRENT USE OF INSULIN: Primary | ICD-10-CM

## 2023-09-27 DIAGNOSIS — Z23 IMMUNIZATION DUE: ICD-10-CM

## 2023-09-27 PROCEDURE — 90694 VACC AIIV4 NO PRSRV 0.5ML IM: CPT | Mod: PBBFAC

## 2023-09-27 PROCEDURE — G0008 ADMIN INFLUENZA VIRUS VAC: HCPCS | Mod: PBBFAC

## 2023-09-27 PROCEDURE — 99214 OFFICE O/P EST MOD 30 MIN: CPT | Mod: S$PBB,25,, | Performed by: NURSE PRACTITIONER

## 2023-09-27 PROCEDURE — 99406 PR TOBACCO USE CESSATION INTERMEDIATE 3-10 MINUTES: ICD-10-PCS | Mod: S$PBB,,, | Performed by: NURSE PRACTITIONER

## 2023-09-27 PROCEDURE — 99215 OFFICE O/P EST HI 40 MIN: CPT | Mod: PBBFAC | Performed by: NURSE PRACTITIONER

## 2023-09-27 PROCEDURE — 99214 PR OFFICE/OUTPT VISIT, EST, LEVL IV, 30-39 MIN: ICD-10-PCS | Mod: S$PBB,25,, | Performed by: NURSE PRACTITIONER

## 2023-09-27 PROCEDURE — 99406 BEHAV CHNG SMOKING 3-10 MIN: CPT | Mod: S$PBB,,, | Performed by: NURSE PRACTITIONER

## 2023-09-27 RX ORDER — METFORMIN HYDROCHLORIDE 1000 MG/1
1000 TABLET ORAL 2 TIMES DAILY WITH MEALS
Qty: 180 TABLET | Refills: 1 | Status: SHIPPED | OUTPATIENT
Start: 2023-09-27 | End: 2024-09-26

## 2023-09-27 RX ORDER — INSULIN DEGLUDEC 200 U/ML
62 INJECTION, SOLUTION SUBCUTANEOUS DAILY
Qty: 3 PEN | Refills: 3 | Status: SHIPPED | OUTPATIENT
Start: 2023-09-27 | End: 2023-10-31 | Stop reason: SDUPTHER

## 2023-09-27 RX ADMIN — INFLUENZA A VIRUS A/VICTORIA/4897/2022 IVR-238 (H1N1) ANTIGEN (FORMALDEHYDE INACTIVATED), INFLUENZA A VIRUS A/DARWIN/6/2021 IVR-227 (H3N2) ANTIGEN (FORMALDEHYDE INACTIVATED), INFLUENZA B VIRUS B/AUSTRIA/1359417/2021 BVR-26 ANTIGEN (FORMALDEHYDE INACTIVATED), INFLUENZA B VIRUS B/PHUKET/3073/2013 BVR-1B ANTIGEN (FORMALDEHYDE INACTIVATED) 0.5 ML: 15; 15; 15; 15 INJECTION, SUSPENSION INTRAMUSCULAR at 10:09

## 2023-09-27 NOTE — PROGRESS NOTES
Cammy L Sammy, NP   [facility]  [address] [facility] [address]      PATIENT NAME: John Addison  : 1955  DATE: 23  MRN: [id number]        Reason for Visit / Chief Complaint: Flu Vaccine and Diabetes       History of Present Illness / Problem Focused Workflow     John Addison presents to the clinic with Flu Vaccine and Diabetes     22: 68 yo AAM for routine follow up presenting unaccompanied. Last visit 22. PMH includes HTN, HLD, CAD/NSTEMI (2016) s/p PCI of left circumflex, inferior wall STEMI s/p PTCA/BHAKTI of distal RCA 2017, type 2 DM, elevated LFTs, lymphadenopathy, tobacco abuse, and morbid obesity. He did not bring medications for visit. Has home health to check blood sugars. He is under care of [facility]. /84. He refuses colonoscopy however willing to complete stool test. Otherwise, denies any other concerns/ complaints, CP, SOB, HA, dizziness, abdominal pain, poor appetite, n/v/d, bloody stools.      23: 67 yo AAM for follow up. Patient presented to ER 5/10/23 for c/o groin pain and drainage from scrotum with diagnosis of yasmin's gangrene/ sepsis. Surgical debridement 5/10/23. Anaerobic culture significant for prevotella species and tissue culture significant for Enterococcus faecalis. He was sent to [facility]. He had left thigh abscess for which he underwent I&D for as well. He was discharged from [facility] 23. He has HH daily and caring for wound. He states he is being told it is healing well. He denies any fever, chills, weakness, body aches, poor appetite. Last A1c 9.7% 2023. He is compliant with Glipizide, Tresiba and Trulicity. He is not taking Levemir. He does not administer his insulin and HH nurse goes once a day. He was being followed by [facility] endo however last visit 2022. He is in need of f/u with GI, Cardiology, and Endo. No other concerns at this time.      23: Pt for  follow up. /75. He was seen by surgery clinic yesterday and evaluated for new right thigh abscess. He states he refused I&D. He is on antibiotics. He states  nurse coming daily for wound care/ checks. Denies any fever, chills, weakness, numbness, CP, SOB, abdominal pain, poor appetite, n/v/d.     9/27/23: Pt for 4 week f/u for DM. Sugars are still high. He admits to still eating a lot of rice. He reports right foot great toenail fell off. He does endorse b/l leg pain. Had f/u with Cardiology 9/12/23. He still smokes 0.5 ppd.  He wants flu vaccine. He denies any new wounds or abscess. He states home health continues to go weekly and has discontinued wound care. No other concerns.      Other providers:  [facility]  [facility] Cardiology  [facility] Pulmonology- last visit 2/2/21  [facility] ENT   Dr. Romero/ Dr. Juarez Podiatry  [facility] Endo  Dr. Smith Lay- General Surgeon        Review of Systems     Review of Systems   Constitutional:  Negative for appetite change, chills, fatigue, fever and unexpected weight change.   HENT:  Negative for congestion, ear pain, hearing loss, sinus pressure, sore throat and tinnitus.    Respiratory:  Negative for cough, chest tightness, shortness of breath and wheezing.    Cardiovascular:  Negative for chest pain, palpitations and leg swelling.        Leg pain   Gastrointestinal:  Negative for abdominal distention, abdominal pain, blood in stool, constipation, diarrhea, nausea and vomiting.   Genitourinary:  Negative for dysuria and hematuria.   Musculoskeletal:  Negative for arthralgias and myalgias.   Skin:  Negative for pallor.   Allergic/Immunologic: Negative for environmental allergies.   Neurological:  Negative for dizziness, syncope, weakness, numbness and headaches.   Hematological:  Negative for adenopathy. Does not bruise/bleed easily.   Psychiatric/Behavioral:  Negative for agitation, behavioral problems, confusion, hallucinations, sleep disturbance and suicidal ideas. The patient  is not nervous/anxious.        Medical / Social / Family History     ----------------------------  COPD (chronic obstructive pulmonary disease)  Coronary artery disease  Diabetes mellitus  hyperlipidemia  Hypertension     Past Surgical History:   Procedure Laterality Date    CORONARY ANGIOPLASTY      EXCISION, LESION, LOWER EXTREMITY Left 5/25/2023    Procedure: EXCISION, LESION, LOWER EXTREMITY;  Surgeon: South Terry MD;  Location: [facility] OR;  Service: General;  Laterality: Left;    TONSILLECTOMY  07/2018    WOUND DEBRIDEMENT N/A 5/10/2023    Procedure: DEBRIDEMENT, WOUND;  Surgeon: Smith Lay MD;  Location: [facility] OR;  Service: General;  Laterality: N/A;  perineum debridement, lithotomy       Social History     Socioeconomic History    Marital status: Single   Tobacco Use    Smoking status: Every Day     Current packs/day: 0.50     Average packs/day: 0.5 packs/day for 59.4 years (29.7 ttl pk-yrs)     Types: Cigarettes     Start date: 5/8/1964    Smokeless tobacco: Never   Substance and Sexual Activity    Alcohol use: Yes     Comment: beer 2x/month    Drug use: Yes     Types: Marijuana    Sexual activity: Not Currently     Social Determinants of Health     Financial Resource Strain: Low Risk  (5/19/2023)    Overall Financial Resource Strain (CARDIA)     Difficulty of Paying Living Expenses: Not hard at all   Food Insecurity: No Food Insecurity (5/19/2023)    Hunger Vital Sign     Worried About Running Out of Food in the Last Year: Never true     Ran Out of Food in the Last Year: Never true   Transportation Needs: No Transportation Needs (5/19/2023)    PRAPARE - Transportation     Lack of Transportation (Medical): No     Lack of Transportation (Non-Medical): No   Physical Activity: Inactive (5/19/2023)    Exercise Vital Sign     Days of Exercise per Week: 0 days     Minutes of Exercise per Session: 0 min   Stress: No Stress Concern Present (5/19/2023)    [employer] -  Occupational Stress Questionnaire     Feeling of Stress : Not at all   Social Connections: Moderately Isolated (6/16/2023)    Social Connection and Isolation Panel [NHANES]     Frequency of Communication with Friends and Family: More than three times a week     Frequency of Social Gatherings with Friends and Family: Three times a week     Attends Sikhism Services: More than 4 times per year     Active Member of Clubs or Organizations: No     Attends Club or Organization Meetings: Never     Marital Status:    Housing Stability: Low Risk  (5/19/2023)    Housing Stability Vital Sign     Unable to Pay for Housing in the Last Year: No     Number of Places Lived in the Last Year: 1     Unstable Housing in the Last Year: No        Family History   Problem Relation Age of Onset    Hypertension Mother     Heart failure Mother     Heart attack Mother     Coronary artery disease Mother     Cancer Father     Cancer Sister         Medications and Allergies     Medications  Current Outpatient Medications   Medication Instructions    albuterol (PROVENTIL/VENTOLIN HFA) 90 mcg/actuation inhaler 2 puffs, Inhalation, As needed (PRN)    aspirin 81 mg, Oral, Daily    atorvastatin (LIPITOR) 80 mg, Oral, Daily    clopidogreL (PLAVIX) 75 mg, Oral, Daily    docusate sodium (COLACE) 100 mg, Oral, As needed (PRN)    dulaglutide (TRULICITY) 3 mg, Subcutaneous, Every 7 days    famotidine (PEPCID) 20 mg, Oral, 2 times daily    ferrous sulfate 324 mg (65 mg iron) TbEC Oral, 2 times daily    finasteride (PROSCAR) 5 mg, Oral, Daily    fluticasone-salmeterol diskus inhaler 250-50 mcg 1 puff, Inhalation, 2 times daily, Controller    folic acid (FOLVITE) 1 mg, Oral, Daily    gabapentin (NEURONTIN) 300 mg, Oral, 3 times daily PRN    glipiZIDE (GLUCOTROL) 10 mg, Oral, With breakfast    insulin degludec (TRESIBA FLEXTOUCH U-200) 62 Units, Subcutaneous, Daily    lisinopriL (PRINIVIL,ZESTRIL) 20 mg, Oral, Daily    metFORMIN (GLUCOPHAGE) 1,000  "mg, Oral, 2 times daily with meals    metoprolol tartrate (LOPRESSOR) 50 mg, Oral, 2 times daily    sodium bicarbonate 650 mg, Oral, 2 times daily    tamsulosin (FLOMAX) 0.4 mg, Oral, 2 times daily       Allergies  Review of patient's allergies indicates:  No Known Allergies    Physical Examination     Visit Vitals  /60 (BP Location: Left arm, Patient Position: Sitting, BP Method: Large (Manual))   Pulse 93   Temp 97.8 °F (36.6 °C) (Oral)   Resp 20   Ht 5' 10" (1.778 m)   Wt 115.6 kg (254 lb 12.8 oz)   BMI 36.56 kg/m²       Physical Exam  Vitals and nursing note reviewed.   Constitutional:       Appearance: Normal appearance. He is not ill-appearing.   HENT:      Head: Normocephalic.   Cardiovascular:      Rate and Rhythm: Normal rate and regular rhythm.      Pulses:           Dorsalis pedis pulses are 1+ on the right side and 1+ on the left side.        Posterior tibial pulses are 1+ on the right side and 1+ on the left side.   Pulmonary:      Effort: Pulmonary effort is normal. No respiratory distress.      Breath sounds: Normal breath sounds.   Musculoskeletal:         General: Normal range of motion.      Cervical back: Normal range of motion and neck supple.      Right lower leg: No edema.      Left lower leg: No edema.   Feet:      Right foot:      Skin integrity: No skin breakdown.      Toenail Condition: Right toenails are long.      Left foot:      Skin integrity: No skin breakdown.      Toenail Condition: Left toenails are long.   Skin:     General: Skin is warm and dry.      Capillary Refill: Capillary refill takes less than 2 seconds.   Neurological:      Mental Status: He is alert and oriented to person, place, and time. Mental status is at baseline.      Motor: No weakness.   Psychiatric:         Mood and Affect: Mood normal.         Behavior: Behavior normal.         Thought Content: Thought content normal.         Judgment: Judgment normal.           Results       Lab Results   Component Value " Date    HGBA1C 9.4 (H) 08/28/2023    HGBA1C 9.7 (H) 05/10/2023    HGBA1C 12.1 (H) 06/26/2018       Assessment       ICD-10-CM ICD-9-CM   1. Type 2 diabetes mellitus with diabetic neuropathy, without long-term current use of insulin  E11.40 250.60     357.2   2. Severe obesity (BMI 35.0-39.9) with comorbidity  E66.01 278.01   3. Immunization due  Z23 V05.9   4. Peripheral artery disease  I73.9 443.9   5. Cigarette nicotine dependence with nicotine-induced disorder  F17.219 292.9       Plan       1. Severe obesity (BMI 35.0-39.9) with comorbidity  BMI 36.56   Educated on increased risk of disease s/t obesity.  Educated on health benefits of at least 5 days/ week of 30 minutes moderate intensity exercise (brisk walking) and 2 or more days/ week of muscle strength activities (as tolerated).  Eat well balanced diet of fresh fruits/ vegetables, whole grains, lean meats and limit high carbohydrate foods.       2. Immunization due  - influenza 65up-adj (QUADRIVALENT ADJUVANTED PF) vaccine 0.5 mL    3. Type 2 diabetes mellitus with diabetic neuropathy, without long-term current use of insulin  A1c 9.4% 8/28/23  CBG reports from  remains high upper 200-400  Patient admits he eats a lot of rice.  He admits he eats things he should not.  Again reviewed ADA diet with patient today discussed portion control and to limit carbohydrates and choose complex carbs.  Referral to diabetes education ordered.  DM eye exam: Fundus photos last visit 8/30/23- no report ?    DM foot exam: 8/1/23  Increase Tresiba to 62 units  and resume Metformin 1000 mg BID  F/u 4 weeks   Referral to Diabetes ed   - insulin degludec (TRESIBA FLEXTOUCH U-200) 200 unit/mL (3 mL) insulin pen; Inject 62 Units into the skin once daily.  Dispense: 3 pen ; Refill: 3  - metFORMIN (GLUCOPHAGE) 1000 MG tablet; Take 1 tablet (1,000 mg total) by mouth 2 (two) times daily with meals.  Dispense: 180 tablet; Refill: 1  - Ambulatory referral/consult to Diabetes Education;  Future    4. Peripheral artery disease  Prior SANIA 10/2022 with moderate arterial flow reduction right lower extremity and left with moderately severe arterial flow reduction  Patient reports toenails breaking off and continues to endorse BLE pain. Encouraged smoking cessation. Continue Plavix/ ASA.   Referral to Vascular and re-ordered ABIs  - CV Ultrasound doppler arterial legs bilat; Future  - Ambulatory referral/consult to Vascular Surgery; Future    5. Cigarette nicotine dependence with nicotine-induced disorder  0.5 ppd  Discussed for at least 3 minutes importance of smoking cessation and health benefits, including adverse effects to patient's health and organs.  Encouraged cessation.        Future Appointments   Date Time Provider Department Center   10/9/2023 10:30 AM Tamica Blackwell FNP [facility] [place]    10/31/2023  8:15 AM Cammy Christianson NP [facility]   3/12/2024 10:45 AM Nissa De La Rosa, WENDY [facility] [place]        Follow up in about 4 weeks (around 10/25/2023) for DM and PAD with US results.    Signature:  Cammy Christianson NP  [facility]  [address] [facility] [address]    Date of encounter: 9/27/23

## 2023-10-23 ENCOUNTER — DOCUMENT SCAN (OUTPATIENT)
Dept: HOME HEALTH SERVICES | Facility: HOSPITAL | Age: 68
End: 2023-10-23
Payer: MEDICARE

## 2023-10-25 ENCOUNTER — TELEPHONE (OUTPATIENT)
Dept: INTERNAL MEDICINE | Facility: CLINIC | Age: 68
End: 2023-10-25
Payer: MEDICARE

## 2023-10-25 NOTE — TELEPHONE ENCOUNTER
Patient consistently is non compliant with ADA diet and admits to eating foods he should not be. Advise to comply with diet and take medications as prescribed. Please find out what dose of insulin he is taking and/or if he is taking regularly?     Thank you

## 2023-10-25 NOTE — TELEPHONE ENCOUNTER
Sarah walters/ [facility] called stating pt blood sugars have been elevated. Today it was 420 and she stated he doesn't check everyday like he is suppose to, he only checks when she comes. Last few weeks it has been elevated when she does come. She stated he does miss a few doses of the glipizide. Please advise.

## 2023-10-25 NOTE — TELEPHONE ENCOUNTER
I spoke to Sarah with home health and she said he is non compliant and said this morning he was eating a big bowl of rice when she went by and that is probably why his sugar was 420. She also said it stays consistently high but again, he refuses to take advice and follow the diet or take his medications as directed. She said she gives him his insulin once a week when she goes along with his Trulicity but that his granddaughter said she gives him his insulin every day. She said he is not taking his medication as prescribed because when she goes he will have the same amount as he did last visit and refuses to follow the ADA diet. She also said he told her not to call us but with it being so high she wanted us to be aware.

## 2023-10-31 ENCOUNTER — TELEPHONE (OUTPATIENT)
Dept: INTERNAL MEDICINE | Facility: CLINIC | Age: 68
End: 2023-10-31

## 2023-10-31 ENCOUNTER — OFFICE VISIT (OUTPATIENT)
Dept: INTERNAL MEDICINE | Facility: CLINIC | Age: 68
End: 2023-10-31
Payer: MEDICARE

## 2023-10-31 VITALS
BODY MASS INDEX: 36.65 KG/M2 | HEIGHT: 70 IN | SYSTOLIC BLOOD PRESSURE: 114 MMHG | OXYGEN SATURATION: 98 % | WEIGHT: 256 LBS | TEMPERATURE: 98 F | RESPIRATION RATE: 16 BRPM | HEART RATE: 98 BPM | DIASTOLIC BLOOD PRESSURE: 74 MMHG

## 2023-10-31 DIAGNOSIS — L02.214 ABSCESS OF GROIN, LEFT: ICD-10-CM

## 2023-10-31 DIAGNOSIS — E11.40 TYPE 2 DIABETES MELLITUS WITH DIABETIC NEUROPATHY, WITHOUT LONG-TERM CURRENT USE OF INSULIN: Primary | ICD-10-CM

## 2023-10-31 DIAGNOSIS — R09.81 SINUS CONGESTION: ICD-10-CM

## 2023-10-31 DIAGNOSIS — I73.9 PERIPHERAL ARTERY DISEASE: ICD-10-CM

## 2023-10-31 PROCEDURE — 99214 PR OFFICE/OUTPT VISIT, EST, LEVL IV, 30-39 MIN: ICD-10-PCS | Mod: S$PBB,,, | Performed by: NURSE PRACTITIONER

## 2023-10-31 PROCEDURE — 99215 OFFICE O/P EST HI 40 MIN: CPT | Mod: PBBFAC | Performed by: NURSE PRACTITIONER

## 2023-10-31 PROCEDURE — 99214 OFFICE O/P EST MOD 30 MIN: CPT | Mod: S$PBB,,, | Performed by: NURSE PRACTITIONER

## 2023-10-31 RX ORDER — CLINDAMYCIN HYDROCHLORIDE 300 MG/1
300 CAPSULE ORAL EVERY 8 HOURS
Qty: 21 CAPSULE | Refills: 0 | Status: SHIPPED | OUTPATIENT
Start: 2023-10-31 | End: 2023-11-07

## 2023-10-31 RX ORDER — INSULIN DEGLUDEC 200 U/ML
64 INJECTION, SOLUTION SUBCUTANEOUS DAILY
Qty: 3 PEN | Refills: 1 | Status: SHIPPED | OUTPATIENT
Start: 2023-10-31

## 2023-10-31 NOTE — ASSESSMENT & PLAN NOTE
A1c 9.4% 8/28/23  CBG reports from  remains high upper 200-400  Patient admits he eats a lot of rice.  He admits he eats things he should not.  Again reviewed ADA diet with patient today discussed portion control and to limit carbohydrates and choose complex carbs.  Referral to diabetes education ordered.  DM eye exam: Fundus photos last visit 8/30/23- no report ?    DM foot exam: 8/1/23  Increase Tresiba to 64 units

## 2023-10-31 NOTE — PROGRESS NOTES
Cammy L Sammy, NP   [facility]  [address] [address]      PATIENT NAME: John Addison  : 1955  DATE: 10/31/23  MRN: [id number]        Reason for Visit / Chief Complaint: Follow-up (To discuss US results), Diabetes, and Nasal Congestion       History of Present Illness / Problem Focused Workflow     John Addison presents to the clinic with Follow-up (To discuss US results), Diabetes, and Nasal Congestion     22: 68 yo AAM for routine follow up presenting unaccompanied. Last visit 22. PMH includes HTN, HLD, CAD/NSTEMI (2016) s/p PCI of left circumflex, inferior wall STEMI s/p PTCA/BHAKTI of distal RCA 2017, type 2 DM, elevated LFTs, lymphadenopathy, tobacco abuse, and morbid obesity. He did not bring medications for visit. Has home health to check blood sugars. He is under care of [facility]. /84. He refuses colonoscopy however willing to complete stool test. Otherwise, denies any other concerns/ complaints, CP, SOB, HA, dizziness, abdominal pain, poor appetite, n/v/d, bloody stools.      23: 69 yo AAM for follow up. Patient presented to ER 5/10/23 for c/o groin pain and drainage from scrotum with diagnosis of yasmin's gangrene/ sepsis. Surgical debridement 5/10/23. Anaerobic culture significant for prevotella species and tissue culture significant for Enterococcus faecalis. He was sent to [facility]. He had left thigh abscess for which he underwent I&D for as well. He was discharged from [facility] 23. He has HH daily and caring for wound. He states he is being told it is healing well. He denies any fever, chills, weakness, body aches, poor appetite. Last A1c 9.7% 2023. He is compliant with Glipizide, Tresiba and Trulicity. He is not taking Levemir. He does not administer his insulin and HH nurse goes once a day. He was being followed by [facility] endo however last visit 2022. He is in need of f/u  with GI, Cardiology, and Endo. No other concerns at this time.      8/30/23: Pt for follow up. /75. He was seen by surgery clinic yesterday and evaluated for new right thigh abscess. He states he refused I&D. He is on antibiotics. He states  nurse coming daily for wound care/ checks. Denies any fever, chills, weakness, numbness, CP, SOB, abdominal pain, poor appetite, n/v/d.      9/27/23: Pt for 4 week f/u for DM. Sugars are still high. He admits to still eating a lot of rice. He reports right foot great toenail fell off. He does endorse b/l leg pain. Had f/u with Cardiology 9/12/23. He still smokes 0.5 ppd.  He wants flu vaccine. He denies any new wounds or abscess. He states home health continues to go weekly and has discontinued wound care. No other concerns.      10/31/23:  68-year-old  male for 4 week follow-up for uncontrolled diabetes.  He continues to state he continues to eat rice and potatoes regularly.  He is aware of his blood sugars being high.  He admits to taking his medications as prescribed.  Home health notes received with elevated blood sugars.  Patient is aware.  He is complaining of drainage to left groin ongoing intermittently for over a week.  He states he noticed some blood on his towel after wiping after bathing.  He admits to bathing twice weekly.  Using dial soap.  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  Last seen surgery clinic August 2023.  He denies any fever, chills, weakness, nausea, vomiting.  Endorsing nasal congestion.  Otherwise denies any other concerns at this time.    Other providers:  [facility] GI  [facility] Cardiology  [facility] Pulmonology- last visit 2/2/21  [facility] ENT   Dr. Romero/ Dr. Juarez Podiatry  [facility] Endo  Dr. Smith Lay- General Surgeon        Review of Systems     Review of Systems   Constitutional:  Negative for appetite change, chills, fatigue, fever and unexpected weight change.   HENT:  Positive for congestion. Negative for ear pain,  hearing loss, sinus pressure, sore throat and tinnitus.    Respiratory:  Negative for cough, chest tightness, shortness of breath and wheezing.    Cardiovascular:  Negative for chest pain, palpitations and leg swelling.   Gastrointestinal:  Negative for abdominal distention, abdominal pain, blood in stool, constipation, diarrhea, nausea and vomiting.   Genitourinary:  Negative for dysuria and hematuria.   Musculoskeletal:  Negative for arthralgias and myalgias.   Skin:  Negative for pallor.        Drainage left groin   Allergic/Immunologic: Negative for environmental allergies.   Neurological:  Negative for dizziness, syncope, weakness, numbness and headaches.   Hematological:  Negative for adenopathy. Does not bruise/bleed easily.   Psychiatric/Behavioral:  Negative for agitation, behavioral problems, confusion, hallucinations, sleep disturbance and suicidal ideas. The patient is not nervous/anxious.        Medical / Social / Family History     ----------------------------  COPD (chronic obstructive pulmonary disease)  Coronary artery disease  Diabetes mellitus  hyperlipidemia  Hypertension     Past Surgical History:   Procedure Laterality Date    CORONARY ANGIOPLASTY      EXCISION, LESION, LOWER EXTREMITY Left 5/25/2023    Procedure: EXCISION, LESION, LOWER EXTREMITY;  Surgeon: South Terry MD;  Location: [facility];  Service: General;  Laterality: Left;    TONSILLECTOMY  07/2018    WOUND DEBRIDEMENT N/A 5/10/2023    Procedure: DEBRIDEMENT, WOUND;  Surgeon: Smith Lay MD;  Location: [facility];  Service: General;  Laterality: N/A;  perineum debridement, lithotomy       Social History     Socioeconomic History    Marital status: Single   Tobacco Use    Smoking status: Every Day     Current packs/day: 0.50     Average packs/day: 0.5 packs/day for 59.5 years (29.7 ttl pk-yrs)     Types: Cigarettes     Start date: 5/8/1964    Smokeless tobacco: Never   Substance and Sexual Activity    Alcohol use: Yes      Comment: beer 2x/month    Drug use: Yes     Types: Marijuana    Sexual activity: Not Currently     Social Determinants of Health     Financial Resource Strain: Low Risk  (5/19/2023)    Overall Financial Resource Strain (CARDIA)     Difficulty of Paying Living Expenses: Not hard at all   Food Insecurity: No Food Insecurity (5/19/2023)    Hunger Vital Sign     Worried About Running Out of Food in the Last Year: Never true     Ran Out of Food in the Last Year: Never true   Transportation Needs: No Transportation Needs (5/19/2023)    PRAPARE - Transportation     Lack of Transportation (Medical): No     Lack of Transportation (Non-Medical): No   Physical Activity: Inactive (5/19/2023)    Exercise Vital Sign     Days of Exercise per Week: 0 days     Minutes of Exercise per Session: 0 min   Stress: No Stress Concern Present (5/19/2023)    [place] [employer] - Occupational Stress Questionnaire     Feeling of Stress : Not at all   Social Connections: Moderately Isolated (6/16/2023)    Social Connection and Isolation Panel [NHANES]     Frequency of Communication with Friends and Family: More than three times a week     Frequency of Social Gatherings with Friends and Family: Three times a week     Attends [employer] Services: More than 4 times per year     Active Member of Clubs or Organizations: No     Attends Club or Organization Meetings: Never     Marital Status:    Housing Stability: Low Risk  (5/19/2023)    Housing Stability Vital Sign     Unable to Pay for Housing in the Last Year: No     Number of Places Lived in the Last Year: 1     Unstable Housing in the Last Year: No        Family History   Problem Relation Age of Onset    Hypertension Mother     Heart failure Mother     Heart attack Mother     Coronary artery disease Mother     Cancer Father     Cancer Sister         Medications and Allergies     Medications  Current Outpatient Medications   Medication Instructions    albuterol  "(PROVENTIL/VENTOLIN HFA) 90 mcg/actuation inhaler 2 puffs, Inhalation, As needed (PRN)    aspirin 81 mg, Oral, Daily    atorvastatin (LIPITOR) 80 mg, Oral, Daily    clindamycin (CLEOCIN) 300 mg, Oral, Every 8 hours    clopidogreL (PLAVIX) 75 mg, Oral, Daily    docusate sodium (COLACE) 100 mg, Oral, As needed (PRN)    dulaglutide (TRULICITY) 3 mg, Subcutaneous, Every 7 days    famotidine (PEPCID) 20 mg, Oral, 2 times daily    ferrous sulfate 324 mg (65 mg iron) TbEC Oral, 2 times daily    finasteride (PROSCAR) 5 mg, Oral, Daily    fluticasone-salmeterol diskus inhaler 250-50 mcg 1 puff, Inhalation, 2 times daily, Controller    folic acid (FOLVITE) 1 mg, Oral, Daily    gabapentin (NEURONTIN) 300 mg, Oral, 3 times daily PRN    glipiZIDE (GLUCOTROL) 10 mg, Oral, With breakfast    insulin degludec (TRESIBA FLEXTOUCH U-200) 64 Units, Subcutaneous, Daily    lisinopriL (PRINIVIL,ZESTRIL) 20 mg, Oral, Daily    metFORMIN (GLUCOPHAGE) 1,000 mg, Oral, 2 times daily with meals    metoprolol tartrate (LOPRESSOR) 50 mg, Oral, 2 times daily    sodium bicarbonate 650 mg, Oral, 2 times daily    tamsulosin (FLOMAX) 0.4 mg, Oral, 2 times daily       Allergies  Review of patient's allergies indicates:  No Known Allergies    Physical Examination     Visit Vitals  /74 (BP Location: Right arm, Patient Position: Sitting, BP Method: Large (Automatic))   Pulse 98   Temp 97.9 °F (36.6 °C) (Oral)   Resp 16   Ht 5' 10" (1.778 m)   Wt 116.1 kg (256 lb)   SpO2 98%   BMI 36.73 kg/m²       Physical Exam  Vitals and nursing note reviewed.   Constitutional:       General: He is not in acute distress.     Appearance: Normal appearance. He is not ill-appearing, toxic-appearing or diaphoretic.   HENT:      Head: Normocephalic.   Cardiovascular:      Rate and Rhythm: Normal rate and regular rhythm.      Pulses: Normal pulses.   Pulmonary:      Effort: Pulmonary effort is normal. No respiratory distress.      Breath sounds: Normal breath sounds. " "  Musculoskeletal:      Cervical back: Neck supple.      Right lower leg: No edema.      Left lower leg: No edema.   Skin:     General: Skin is warm and dry.      Capillary Refill: Capillary refill takes less than 2 seconds.      Findings: Wound (Left groin with bloody drainage and tenderness with palpation) present.          Neurological:      Mental Status: He is alert and oriented to person, place, and time. Mental status is at baseline.      Motor: No weakness.      Coordination: Coordination normal.      Gait: Gait normal.   Psychiatric:         Mood and Affect: Mood normal.         Behavior: Behavior normal.         Thought Content: Thought content normal.         Judgment: Judgment normal.           Results       Lab Results   Component Value Date    HGBA1C 9.4 (H) 08/28/2023    HGBA1C 9.7 (H) 05/10/2023    HGBA1C 12.1 (H) 06/26/2018     No results found for: "MICROALBUR", "[id number]"   Results for orders placed in visit on 08/22/22    Diabetic Eye Screening Photo         Assessment       ICD-10-CM ICD-9-CM   1. Type 2 diabetes mellitus with diabetic neuropathy, without long-term current use of insulin  E11.40 250.60     357.2   2. Abscess of groin, left  L02.214 682.2   3. Sinus congestion  R09.81 478.19   4. Peripheral artery disease  I73.9 443.9       Plan       Problem List Items Addressed This Visit          Cardiac/Vascular    Peripheral artery disease    Current Assessment & Plan     ABIs not completed and reordered again            ID    Abscess of groin, left    Current Assessment & Plan     History of Yesi's gangrene left scrotum last surgery clinic appointment August 2023.  Patient reports with drainage noted from left groin for approximately 1 week.  Denies any systemic symptoms.  Rx clindamycin as prescribed and referral to surgery Clinic ordered.         Relevant Medications    clindamycin (CLEOCIN) 300 MG capsule    Other Relevant Orders    Ambulatory referral/consult to General Surgery    "    Endocrine    Type 2 diabetes mellitus with diabetic neuropathy, without long-term current use of insulin - Primary    Current Assessment & Plan     A1c 9.4% 8/28/23  CBG reports from  remains high upper 200-400  Patient admits he eats a lot of rice.  He admits he eats things he should not.  Again reviewed ADA diet with patient today discussed portion control and to limit carbohydrates and choose complex carbs.  Referral to diabetes education ordered.  DM eye exam: Fundus photos last visit 8/30/23- no report ?    DM foot exam: 8/1/23  Increase Tresiba to 64 units         Relevant Medications    insulin degludec (TRESIBA FLEXTOUCH U-200) 200 unit/mL (3 mL) insulin pen     Other Visit Diagnoses       Sinus congestion      Patient can take over-the-counter sinus relief medication such as Coricidin            Future Appointments   Date Time Provider Department Center   12/21/2023  8:00 AM Cammy Christianson NP [facility]   3/12/2024 10:45 AM Nissa De La Rosa, [facility] [place]        Follow up in about 4 weeks (around 11/28/2023) for with fasting labs before visit.    Signature:  Cammy Christianson NP  [facility]  [address] [facility] [address]    Date of encounter: 10/31/23

## 2023-10-31 NOTE — ASSESSMENT & PLAN NOTE
History of Yesi's gangrene left scrotum last surgery clinic appointment August 2023.  Patient reports with drainage noted from left groin for approximately 1 week.  Denies any systemic symptoms.  Rx clindamycin as prescribed and referral to surgery Clinic ordered.

## 2023-11-21 ENCOUNTER — TELEPHONE (OUTPATIENT)
Dept: INTERNAL MEDICINE | Facility: CLINIC | Age: 68
End: 2023-11-21
Payer: MEDICARE

## 2023-11-21 NOTE — TELEPHONE ENCOUNTER
Please follow up on surgery clinic referral ordered at pt's last visit. He needs to f/u with them (previous pt who was recently seen in their clinic, last seen 8/29/23).     Thank you

## 2023-11-21 NOTE — TELEPHONE ENCOUNTER
----- Message from Cammy Christianson NP sent at 10/31/2023  3:02 PM CDT -----  Regarding: surgery referral

## 2023-11-22 NOTE — TELEPHONE ENCOUNTER
Spoke with Ok in [facility] pharmacy & confirmed patient needs refill. Please advise. Thanks

## 2023-11-28 ENCOUNTER — TELEPHONE (OUTPATIENT)
Dept: INTERNAL MEDICINE | Facility: CLINIC | Age: 68
End: 2023-11-28
Payer: MEDICARE

## 2023-11-28 RX ORDER — FOLIC ACID 1 MG/1
1 TABLET ORAL DAILY
Qty: 90 TABLET | Refills: 0 | Status: SHIPPED | OUTPATIENT
Start: 2023-11-28 | End: 2024-02-26

## 2023-11-28 RX ORDER — LISINOPRIL 20 MG/1
20 TABLET ORAL DAILY
Qty: 90 TABLET | Refills: 0 | Status: SHIPPED | OUTPATIENT
Start: 2023-11-28 | End: 2024-03-12

## 2023-11-28 RX ORDER — METOPROLOL TARTRATE 50 MG/1
50 TABLET ORAL 2 TIMES DAILY
Qty: 180 TABLET | Refills: 0 | Status: SHIPPED | OUTPATIENT
Start: 2023-11-28 | End: 2024-03-12 | Stop reason: SDUPTHER

## 2023-11-28 NOTE — TELEPHONE ENCOUNTER
Please contact patient and confirm patient is taking medications as prescribed for diabetes and blood pressure/HR,   Especially metoprolol 50 mg twice daily. Home health vital sign report noted from 11/22/2023 with elevated heart rate 112 and glucose greater than 400.  Encouraged compliance with diet modifications to follow low-salt diet and low carb diet.  If patient is experiencing symptoms such as dizziness, weakness, chest pain, shortness of breath, abdominal pain, poor appetite, sweating, nausea, vomiting, diarrhea, fever or chills patient should report to ER for further evaluation.

## 2023-11-29 ENCOUNTER — TELEPHONE (OUTPATIENT)
Dept: INTERNAL MEDICINE | Facility: CLINIC | Age: 68
End: 2023-11-29

## 2023-12-06 ENCOUNTER — TELEPHONE (OUTPATIENT)
Dept: INTERNAL MEDICINE | Facility: CLINIC | Age: 68
End: 2023-12-06
Payer: MEDICARE

## 2023-12-06 NOTE — TELEPHONE ENCOUNTER
HH notes received with elevated HR and blood sugar. Please ask pt if he is taking metoprolol as prescribed as this will help lower his HR. Please verify this.   Also, please encourage him to follow diabetic diet and to take medications as prescribed.

## 2023-12-18 ENCOUNTER — TELEPHONE (OUTPATIENT)
Dept: INTERNAL MEDICINE | Facility: CLINIC | Age: 68
End: 2023-12-18
Payer: MEDICARE

## 2023-12-18 NOTE — TELEPHONE ENCOUNTER
Spoke to Susan with surgery clinic, she states they tried calling him and left him a vml on 12/12/23 to call them back to get scheduled. She said she will reach out again today.

## 2023-12-27 ENCOUNTER — DOCUMENT SCAN (OUTPATIENT)
Dept: HOME HEALTH SERVICES | Facility: HOSPITAL | Age: 68
End: 2023-12-27
Payer: MEDICARE

## 2024-01-19 ENCOUNTER — EXTERNAL HOME HEALTH (OUTPATIENT)
Dept: HOME HEALTH SERVICES | Facility: HOSPITAL | Age: 69
End: 2024-01-19
Payer: MEDICARE

## 2024-01-24 ENCOUNTER — TELEPHONE (OUTPATIENT)
Dept: INTERNAL MEDICINE | Facility: CLINIC | Age: 69
End: 2024-01-24
Payer: MEDICARE

## 2024-01-24 NOTE — TELEPHONE ENCOUNTER
Spoke with Ms Smith,  nurse, she reports patients  this morning. Patient reports he ate rice this morning for breakfast & eats about 5 box of crackers a month. Sarah reports patient wasn't having any s/s of hyperglycemia & he did not want her to call NP Sammy to report but has to follow protocol.

## 2024-01-24 NOTE — TELEPHONE ENCOUNTER
----- Message from Sharon De Leon sent at 1/24/2024 10:25 AM CST -----  .Type:  Needs Medical Advice    Who Called: nurse Sarah  Symptoms (please be specific):    How long has patient had these symptoms:    Pharmacy name and phone #:    Would the patient rather a call back or a response via [facility]? cb  Best Call Back Number: [phone]  Additional Information: nurse at home with high blood sugar reading almost 500 please call

## 2024-01-30 ENCOUNTER — OFFICE VISIT (OUTPATIENT)
Dept: SURGERY | Facility: CLINIC | Age: 69
End: 2024-01-30
Payer: MEDICARE

## 2024-01-30 ENCOUNTER — TELEPHONE (OUTPATIENT)
Dept: INTERNAL MEDICINE | Facility: CLINIC | Age: 69
End: 2024-01-30
Payer: MEDICARE

## 2024-01-30 VITALS
RESPIRATION RATE: 18 BRPM | BODY MASS INDEX: 37.51 KG/M2 | SYSTOLIC BLOOD PRESSURE: 120 MMHG | DIASTOLIC BLOOD PRESSURE: 75 MMHG | WEIGHT: 262 LBS | HEART RATE: 118 BPM | HEIGHT: 70 IN | OXYGEN SATURATION: 95 % | TEMPERATURE: 98 F

## 2024-01-30 DIAGNOSIS — L02.214 ABSCESS OF GROIN, LEFT: ICD-10-CM

## 2024-01-30 PROCEDURE — 99215 OFFICE O/P EST HI 40 MIN: CPT | Mod: PBBFAC

## 2024-01-30 NOTE — TELEPHONE ENCOUNTER
HH notes received with non compliance with diabetic diet and medication. VS noted with elevated HR and CBGs. Encourage compliance with diabetic diet and all medications. Patient should have family member assist/ administer insulin injections for him if he refuses to do so. Patient is also due for follow up visit. Last appt 1/24/24 was canceled and has not been rescheduled.     Clerical staff: please contact to schedule f/u visit for patient. Last appt 1/24/24 canceled and needs to be r/s.     Thank you

## 2024-01-30 NOTE — TELEPHONE ENCOUNTER
Called and spoke to pt' s daughter (Chen) x2 . Phone call was disconnected while speaking to pt's daughter . Informed her of provider's message /recommendations. While trying to reschedule pt's appt call was dropped during both calls. Daughter did state she was at work. Will try contacting daughter again to schedule pt's appt.

## 2024-01-30 NOTE — PROGRESS NOTES
[facility] General Surgery Clinic Note     HPI: John Addison is a 69 y.o. 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 Per chart, in August 2023, patient was given antibiotics for a posterior thigh wound. Patient referred to surgery clinic by PCP as patient concerned for need of continued antibiotics due to complaints of drainage from his left groin in October. Patient reports his L groin wound has healed well with no residual pain. There is no new onset pain, overlying skin changes, masses, or swelling. He denies recent fever, chills, nausea, vomiting or diarrhea. Reports no swelling, pain, or skin changes on his R thigh.      PMH:        Past Medical History:   Diagnosis Date    COPD (chronic obstructive pulmonary disease)      Coronary artery disease      Diabetes mellitus      hyperlipidemia      Hypertension        Meds:   Current Outpatient Medications:     albuterol (PROVENTIL/VENTOLIN HFA) 90 mcg/actuation inhaler, Inhale 2 puffs into the lungs as needed for Wheezing or Shortness of Breath., Disp: 18 g, Rfl: 11    aspirin 81 MG Chew, Take 1 tablet (81 mg total) by mouth once daily., Disp: 360 tablet, Rfl: 0    atorvastatin (LIPITOR) 80 MG tablet, Take 1 tablet (80 mg total) by mouth once daily., Disp: 90 tablet, Rfl: 3    clopidogreL (PLAVIX) 75 mg tablet, Take 1 tablet (75 mg total) by mouth once daily., Disp: 90 tablet, Rfl: 3    docusate sodium (COLACE) 100 MG capsule, Take 100 mg by mouth as needed for Constipation., Disp: , Rfl:     dulaglutide (TRULICITY) 3 mg/0.5 mL pen injector, Inject 3 mg into the skin every 7 days., Disp: 4 pen, Rfl: 11    famotidine (PEPCID) 40 MG tablet, Take 0.5 tablets (20 mg total) by mouth 2 (two) times daily., Disp: 180 tablet, Rfl: 3    ferrous sulfate 324 mg (65 mg iron) TbEC, Take by mouth 2 (two) times daily., Disp: , Rfl:     finasteride  (PROSCAR) 5 mg tablet, Take 1 tablet (5 mg total) by mouth once daily., Disp: 30 tablet, Rfl: 11    fluticasone-salmeterol diskus inhaler 250-50 mcg, Inhale 1 puff into the lungs 2 (two) times daily. Controller, Disp: 60 each, Rfl: 11    folic acid (FOLVITE) 1 MG tablet, Take 1 tablet (1 mg total) by mouth once daily., Disp: 90 tablet, Rfl: 0    gabapentin (NEURONTIN) 300 MG capsule, Take 1 capsule (300 mg total) by mouth 3 (three) times daily as needed (pain)., Disp: 30 capsule, Rfl: 1    glipiZIDE (GLUCOTROL) 10 MG TR24, Take 1 tablet (10 mg total) by mouth daily with breakfast., Disp: 90 tablet, Rfl: 1    insulin degludec (TRESIBA FLEXTOUCH U-200) 200 unit/mL (3 mL) insulin pen, Inject 64 Units into the skin once daily., Disp: 3 pen , Rfl: 1    lisinopriL (PRINIVIL,ZESTRIL) 20 MG tablet, Take 1 tablet (20 mg total) by mouth once daily., Disp: 90 tablet, Rfl: 0    metFORMIN (GLUCOPHAGE) 1000 MG tablet, Take 1 tablet (1,000 mg total) by mouth 2 (two) times daily with meals., Disp: 180 tablet, Rfl: 1    metoprolol tartrate (LOPRESSOR) 50 MG tablet, Take 1 tablet (50 mg total) by mouth 2 (two) times daily., Disp: 180 tablet, Rfl: 0    tamsulosin (FLOMAX) 0.4 mg Cap, Take 1 capsule (0.4 mg total) by mouth 2 (two) times a day., Disp: 60 capsule, Rfl: 11    sodium bicarbonate 650 MG tablet, Take 1 tablet (650 mg total) by mouth 2 (two) times daily., Disp: 60 tablet, Rfl: 0  Allergies: Review of patient's allergies indicates:  No Known Allergies  Social History:   Social History            Tobacco Use    Smoking status: Every Day       Current packs/day: 0.50       Average packs/day: 0.5 packs/day for 59.7 years (29.9 ttl pk-yrs)       Types: Cigarettes       Start date: 5/8/1964    Smokeless tobacco: Never   Substance Use Topics    Alcohol use: Yes       Comment: beer 2x/month    Drug use: Yes       Types: Marijuana      Family History:         Family History   Problem Relation Age of Onset    Hypertension Mother       Heart failure Mother      Heart attack Mother      Coronary artery disease Mother      Cancer Father      Cancer Sister        Surgical History:         Past Surgical History:   Procedure Laterality Date    CORONARY ANGIOPLASTY        EXCISION, LESION, LOWER EXTREMITY Left 5/25/2023     Procedure: EXCISION, LESION, LOWER EXTREMITY;  Surgeon: South Terry MD;  Location: [facility] OR;  Service: General;  Laterality: Left;    TONSILLECTOMY   07/2018    WOUND DEBRIDEMENT N/A 5/10/2023     Procedure: DEBRIDEMENT, WOUND;  Surgeon: Smith Lay MD;  Location: [facility] OR;  Service: General;  Laterality: N/A;  perineum debridement, lithotomy      Review of Systems:  Skin: No rashes or itching.  Head: Denies headache or recent trauma.  Eyes: Denies eye pain or double vision.  Neck: Denies swelling or hoarseness of voice.  Respiratory: Denies shortness of breath or chest pain  Cardiac: Denies palpitations or swelling in hands/feet.  Gastrointestinal: Denies nausea, denies vomiting.   Urinary: Denies dysuria or hematuria.  Neuro: Denies weakness.  Endocrine: Denies excessive sweating or cold intolerance. Reports diabetes is poorly controlled, but followed by PCP.  Psych: Denies memory problems. Denies anxiety.     Objective:     Vitals:      Vitals:     01/30/24 0830   BP: 120/75   Pulse: (!) 118   Resp: 18   Temp: 97.9 °F (36.6 °C)         Physical Exam:  Gen: NAD  Neuro: awake, alert, answering questions appropriately  CV: RR  Resp: non-labored breathing, LELAND  Abd: soft, ND, NT  : Wound site from previous Left Yesi's gangrene is well healed. No tenderness, erythema, or swelling in left groin. No open wounds. Right groin and thigh clear of swelling, erythema, or ulcerations.   Ext: moves all 4 spontaneously and purposefully  Skin: warm, well perfused        Assessment/Plan:  John Addison is a 69 y.o. male with extensive PMHx including HTN, HLD, uncontrolled DM type 2, and CAD/mi s/p PCI who presents to clinic  today for f/u after surgery for Yesi's gangrene May 2023. Patient referred to surgery clinic by PCP as patient concerned for need of continued antibiotics. Patients previous wounds are well healed with no signs of infection at this time.      - Discussed with patient that his wound site is well healed and antibiotics are no recommended at this time.  - Patient encouraged to follow-up with PCP for diabetes management.   - Patient to follow up in surgery clinic PRN for any new onset changes to surgical site/groin.  - Answered all patient questions, he verbalized understanding and is comfortable with the plan of care.     Heavenly Mcdonald, MS3  [facility]     01/30/2024 8:43 AM      PGY-3 ADDENDUM  I have seen and examined the patient with the student. Above note has been reviewed and edited.     Rosalva Disla MD PGY-3  [facility] General Surgery  1/30/2024 8:57 AM

## 2024-01-30 NOTE — PROGRESS NOTES
[facility] General Surgery Clinic Note    HPI: John Addison is a 69 y.o. 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 Per chart, in August 2023, patient was given antibiotics for a posterior thigh wound. Patient referred to surgery clinic by PCP as patient concerned for need of continued antibiotics. Patient reports his L groin wound has healed well with no residual pain. There is no new onset pain, overlying skin changes, masses, or swelling. He denies recent fever, chills, nausea, vomiting or diarrhea. Reports no swelling, pain, or skin changes on his R thigh.     PMH:   Past Medical History:   Diagnosis Date    COPD (chronic obstructive pulmonary disease)     Coronary artery disease     Diabetes mellitus     hyperlipidemia     Hypertension       Meds:   Current Outpatient Medications:     albuterol (PROVENTIL/VENTOLIN HFA) 90 mcg/actuation inhaler, Inhale 2 puffs into the lungs as needed for Wheezing or Shortness of Breath., Disp: 18 g, Rfl: 11    aspirin 81 MG Chew, Take 1 tablet (81 mg total) by mouth once daily., Disp: 360 tablet, Rfl: 0    atorvastatin (LIPITOR) 80 MG tablet, Take 1 tablet (80 mg total) by mouth once daily., Disp: 90 tablet, Rfl: 3    clopidogreL (PLAVIX) 75 mg tablet, Take 1 tablet (75 mg total) by mouth once daily., Disp: 90 tablet, Rfl: 3    docusate sodium (COLACE) 100 MG capsule, Take 100 mg by mouth as needed for Constipation., Disp: , Rfl:     dulaglutide (TRULICITY) 3 mg/0.5 mL pen injector, Inject 3 mg into the skin every 7 days., Disp: 4 pen, Rfl: 11    famotidine (PEPCID) 40 MG tablet, Take 0.5 tablets (20 mg total) by mouth 2 (two) times daily., Disp: 180 tablet, Rfl: 3    ferrous sulfate 324 mg (65 mg iron) TbEC, Take by mouth 2 (two) times daily., Disp: , Rfl:     finasteride (PROSCAR) 5 mg tablet, Take 1 tablet (5 mg total) by mouth once daily., Disp: 30  tablet, Rfl: 11    fluticasone-salmeterol diskus inhaler 250-50 mcg, Inhale 1 puff into the lungs 2 (two) times daily. Controller, Disp: 60 each, Rfl: 11    folic acid (FOLVITE) 1 MG tablet, Take 1 tablet (1 mg total) by mouth once daily., Disp: 90 tablet, Rfl: 0    gabapentin (NEURONTIN) 300 MG capsule, Take 1 capsule (300 mg total) by mouth 3 (three) times daily as needed (pain)., Disp: 30 capsule, Rfl: 1    glipiZIDE (GLUCOTROL) 10 MG TR24, Take 1 tablet (10 mg total) by mouth daily with breakfast., Disp: 90 tablet, Rfl: 1    insulin degludec (TRESIBA FLEXTOUCH U-200) 200 unit/mL (3 mL) insulin pen, Inject 64 Units into the skin once daily., Disp: 3 pen , Rfl: 1    lisinopriL (PRINIVIL,ZESTRIL) 20 MG tablet, Take 1 tablet (20 mg total) by mouth once daily., Disp: 90 tablet, Rfl: 0    metFORMIN (GLUCOPHAGE) 1000 MG tablet, Take 1 tablet (1,000 mg total) by mouth 2 (two) times daily with meals., Disp: 180 tablet, Rfl: 1    metoprolol tartrate (LOPRESSOR) 50 MG tablet, Take 1 tablet (50 mg total) by mouth 2 (two) times daily., Disp: 180 tablet, Rfl: 0    tamsulosin (FLOMAX) 0.4 mg Cap, Take 1 capsule (0.4 mg total) by mouth 2 (two) times a day., Disp: 60 capsule, Rfl: 11    sodium bicarbonate 650 MG tablet, Take 1 tablet (650 mg total) by mouth 2 (two) times daily., Disp: 60 tablet, Rfl: 0  Allergies: Review of patient's allergies indicates:  No Known Allergies  Social History:   Social History     Tobacco Use    Smoking status: Every Day     Current packs/day: 0.50     Average packs/day: 0.5 packs/day for 59.7 years (29.9 ttl pk-yrs)     Types: Cigarettes     Start date: 5/8/1964    Smokeless tobacco: Never   Substance Use Topics    Alcohol use: Yes     Comment: beer 2x/month    Drug use: Yes     Types: Marijuana     Family History:   Family History   Problem Relation Age of Onset    Hypertension Mother     Heart failure Mother     Heart attack Mother     Coronary artery disease Mother     Cancer Father     Cancer  Sister      Surgical History:   Past Surgical History:   Procedure Laterality Date    CORONARY ANGIOPLASTY      EXCISION, LESION, LOWER EXTREMITY Left 5/25/2023    Procedure: EXCISION, LESION, LOWER EXTREMITY;  Surgeon: South Terry MD;  Location: [facility] OR;  Service: General;  Laterality: Left;    TONSILLECTOMY  07/2018    WOUND DEBRIDEMENT N/A 5/10/2023    Procedure: DEBRIDEMENT, WOUND;  Surgeon: Smith Lay MD;  Location: [facility] OR;  Service: General;  Laterality: N/A;  perineum debridement, lithotomy     Review of Systems:  Skin: No rashes or itching.  Head: Denies headache or recent trauma.  Eyes: Denies eye pain or double vision.  Neck: Denies swelling or hoarseness of voice.  Respiratory: Denies shortness of breath or chest pain  Cardiac: Denies palpitations or swelling in hands/feet.  Gastrointestinal: Denies nausea, denies vomiting.   Urinary: Denies dysuria or hematuria.  Neuro: Denies weakness.  Endocrine: Denies excessive sweating or cold intolerance. Reports diabetes is poorly controlled, but followed by PCP.  Psych: Denies memory problems. Denies anxiety.    Objective:    Vitals:  Vitals:    01/30/24 0830   BP: 120/75   Pulse: (!) 118   Resp: 18   Temp: 97.9 °F (36.6 °C)        Physical Exam:  Gen: NAD  Neuro: awake, alert, answering questions appropriately  CV: RR  Resp: non-labored breathing, LELAND  Abd: soft, ND, NT  : Wound site from previous Left Yesi's gangrene is well healed. No tenderness, erythema, or swelling in left groin. No open wounds. Right groin and thigh clear of swelling, erythema, or ulcerations.   Ext: moves all 4 spontaneously and purposefully  Skin: warm, well perfused      Assessment/Plan:  John Addison is a 69 y.o. 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 Patient referred to surgery clinic by PCP as patient concerned for need of  continued antibiotics. Patients previous wounds are well healed with no signs of infection at this time.     - Discussed with patient that his wound site is well healed and antibiotics are no recommended at this time.  - Patient encouraged to follow-up with PCP for diabetes management.   - Patient to follow up in surgery clinic PRN for any new onset changes to surgical site/groin.  - Answered all patient questions, he verbalized understanding and is comfortable with the plan of care.    Heavenly Mcdonald, MS3  [facility]    01/30/2024 8:43 AM

## 2024-02-29 ENCOUNTER — TELEPHONE (OUTPATIENT)
Dept: INTERNAL MEDICINE | Facility: CLINIC | Age: 69
End: 2024-02-29
Payer: MEDICARE

## 2024-02-29 DIAGNOSIS — E11.40 TYPE 2 DIABETES MELLITUS WITH DIABETIC NEUROPATHY, WITHOUT LONG-TERM CURRENT USE OF INSULIN: Primary | ICD-10-CM

## 2024-02-29 NOTE — TELEPHONE ENCOUNTER
Spoke with Ms Childs with [facility]  & she reports patient takes medication as prescribed but is non-compliant with ADA diet; therefore, he has been discharged from  services as of 2/28/2024.

## 2024-02-29 NOTE — TELEPHONE ENCOUNTER
HH notes received with continued elevated blood sugars. Is patient taking medications as prescribed for diabetes?     Patient is due for follow up. Please contact to schedule. Remind him he needs to complete fasting blood work prior to visit.     Thank you

## 2024-03-01 NOTE — PROGRESS NOTES
CHIEF COMPLAINT:   Chief Complaint   Patient presents with    Follow-up     6 mos f/u  denies cardiac targets                                                  HPI:  John Addison 69 y.o. male with a PMH significant for CAD/NSTEMI 2/2016 s/p PCI of left circumflex, inferior wall STEMI s/p PTCA/BHAKTI of distal RCA in 9/2017, hypertension, hypercholesterolemia, diabetes, and chronic tobacco who presents to cardiology clinic for follow up and ongoing care.  At last office visit patient stated that he was feeling well, he did endorse mild SOB and leg pain that was his baseline.     Today the patient states that he is feeling stable overall.  He continues to endorse claudication symptoms in bilateral lower extremities.  He describes the pain as a burning pain with walking he also has some heaviness.  Better with rest and sitting.  She also endorses stable shortness of breath that occurs with all levels of exertion and at rest.  He associates this with chronic tobacco use.  Otherwise he denies any further cardiac complaints such as chest pain, PND, orthopnea, lightheadedness, dizziness, syncope, peripheral edema.  He states that he is able to complete his ADLs without ischemic symptoms, but he does experience shortness of breath that has been stable for many years.  He reports compliance with his current medications.  He smokes a approximately 1/2 pack of cigarettes per day, stating that 1 pack will last him about a day and a half.   Of note, patient is hypotensive today on blood pressure readings, however he denies any hypotensive symptoms.                                                                                                                                                                                                                                                                                                                                                                                                                                                                                       CARDIAC TESTING:  Lexiscan Stress Test October 2018:  ECG portion of stress test is negative for ischemia by diagnostic criteria.  Negative for ischemia. Normal myocardial perfusion study on stress imaging. Normal LVEF 57%.  Recommendation:  Recommend medical management of coronary disease if clinically indicated.    SANIA testing November 2018:  Normal resting SANIA  Normal stress Doppler-study limited by patient's complaint of shortness of breath    Echocardiogram September 20, 2017:  Ejection fraction is visually estimated at 55%  Mild mitral regurgitation  No evidence of pericardial effusion          Patient Active Problem List   Diagnosis    Type 2 diabetes mellitus with diabetic neuropathy, without long-term current use of insulin    Coronary artery disease    Hypertension    Hyperlipidemia LDL goal <70    Pain in both lower extremities    Abdominal swelling    Abnormal findings on diagnostic imaging of lung    Abnormal liver function tests    Anemia    Chronic allergic conjunctivitis    Chronic obstructive pulmonary disease    Current drinker of alcohol    Intra-abdominal lymphadenopathy    Tegmen defect of base of skull    Cigarette nicotine dependence with nicotine-induced disorder    Atypical chest pain    Xerosis of skin    Dystrophic nail    Onychomycosis of multiple toenails with type 2 diabetes mellitus    Avulsion of toenail of left foot    Yesi's gangrene    Diabetes mellitus    Yesi's gangrene of scrotum    Abscess    Severe obesity (BMI 35.0-39.9) with comorbidity    Peripheral artery disease    Abscess of groin, left     Past Surgical History:   Procedure Laterality Date    CORONARY ANGIOPLASTY      EXCISION, LESION, LOWER EXTREMITY Left 5/25/2023    Procedure: EXCISION, LESION, LOWER EXTREMITY;  Surgeon: South Terry MD;  Location: [facility];  Service: General;  Laterality: Left;    TONSILLECTOMY  07/2018     WOUND DEBRIDEMENT N/A 5/10/2023    Procedure: DEBRIDEMENT, WOUND;  Surgeon: Smith Lay MD;  Location: [facility] OR;  Service: General;  Laterality: N/A;  perineum debridement, lithotomy     Social History     Socioeconomic History    Marital status: Single   Tobacco Use    Smoking status: Every Day     Current packs/day: 0.50     Average packs/day: 0.5 packs/day for 59.8 years (29.9 ttl pk-yrs)     Types: Cigarettes     Start date: 5/8/1964    Smokeless tobacco: Never   Substance and Sexual Activity    Alcohol use: Yes     Comment: beer 2x/month    Drug use: Yes     Types: Marijuana    Sexual activity: Not Currently     Social Determinants of Health     Financial Resource Strain: Low Risk  (5/19/2023)    Overall Financial Resource Strain (CARDIA)     Difficulty of Paying Living Expenses: Not hard at all   Food Insecurity: No Food Insecurity (5/19/2023)    Hunger Vital Sign     Worried About Running Out of Food in the Last Year: Never true     Ran Out of Food in the Last Year: Never true   Transportation Needs: No Transportation Needs (5/19/2023)    PRAPARE - Transportation     Lack of Transportation (Medical): No     Lack of Transportation (Non-Medical): No   Physical Activity: Inactive (5/19/2023)    Exercise Vital Sign     Days of Exercise per Week: 0 days     Minutes of Exercise per Session: 0 min   Stress: No Stress Concern Present (5/19/2023)    [employer] - Occupational Stress Questionnaire     Feeling of Stress : Not at all   Social Connections: Moderately Isolated (6/16/2023)    Social Connection and Isolation Panel [NHANES]     Frequency of Communication with Friends and Family: More than three times a week     Frequency of Social Gatherings with Friends and Family: Three times a week     Attends Religion Services: More than 4 times per year     Active Member of Clubs or Organizations: No     Attends Club or Organization Meetings: Never     Marital Status:   "  Housing Stability: Low Risk  (5/19/2023)    Housing Stability Vital Sign     Unable to Pay for Housing in the Last Year: No     Number of Places Lived in the Last Year: 1     Unstable Housing in the Last Year: No        Family History   Problem Relation Age of Onset    Hypertension Mother     Heart failure Mother     Heart attack Mother     Coronary artery disease Mother     Cancer Father     Cancer Sister      Review of patient's allergies indicates:  No Known Allergies      ROS:  Review of Systems   Constitutional:  Positive for malaise/fatigue.   HENT: Negative.     Eyes: Negative.    Respiratory:  Positive for shortness of breath.    Cardiovascular:  Positive for claudication. Negative for chest pain, palpitations, orthopnea, leg swelling and PND.   Gastrointestinal: Negative.    Genitourinary: Negative.    Musculoskeletal: Negative.    Skin: Negative.    Neurological: Negative.  Negative for dizziness and weakness.   Endo/Heme/Allergies: Negative.    Psychiatric/Behavioral: Negative.                                                                                                                                                                                  Negative except as stated in the history of present illness. See HPI for details.    PHYSICAL EXAM:  Visit Vitals  BP (!) 90/59 (BP Location: Right arm, Patient Position: Sitting, BP Method: Medium (Manual))   Pulse 88   Temp 97.5 °F (36.4 °C)   Resp 18   Ht 5' 10" (1.778 m)   Wt 119.9 kg (264 lb 5.3 oz)   SpO2 95%   BMI 37.93 kg/m²       Physical Exam  Constitutional:       Appearance: Normal appearance. He is obese.   HENT:      Head: Normocephalic.      Mouth/Throat:      Mouth: Mucous membranes are moist.   Eyes:      Extraocular Movements: Extraocular movements intact.   Cardiovascular:      Rate and Rhythm: Normal rate and regular rhythm.      Pulses: Normal pulses.      Heart sounds: Normal heart sounds.   Pulmonary:      Effort: Pulmonary effort is " normal.   Abdominal:      General: There is no distension.   Musculoskeletal:         General: Normal range of motion.      Right lower leg: No edema.      Left lower leg: No edema.   Skin:     General: Skin is warm and dry.   Neurological:      General: No focal deficit present.      Mental Status: He is alert and oriented to person, place, and time.   Psychiatric:         Mood and Affect: Mood normal.         Behavior: Behavior normal.         Current Outpatient Medications   Medication Instructions    albuterol (PROVENTIL/VENTOLIN HFA) 90 mcg/actuation inhaler 2 puffs, Inhalation, As needed (PRN)    aspirin 81 mg, Oral, Daily    atorvastatin (LIPITOR) 80 mg, Oral, Daily    clopidogreL (PLAVIX) 75 mg, Oral, Daily    docusate sodium (COLACE) 100 mg, Oral, As needed (PRN)    dulaglutide (TRULICITY) 3 mg, Subcutaneous, Every 7 days    famotidine (PEPCID) 20 mg, Oral, 2 times daily    ferrous sulfate 324 mg (65 mg iron) TbEC Oral, 2 times daily    finasteride (PROSCAR) 5 mg, Oral, Daily    fluticasone-salmeterol diskus inhaler 250-50 mcg 1 puff, Inhalation, 2 times daily, Controller    folic acid (FOLVITE) 1 mg, Oral, Daily    gabapentin (NEURONTIN) 300 mg, Oral, 3 times daily PRN    glipiZIDE (GLUCOTROL) 10 mg, Oral, With breakfast    insulin degludec (TRESIBA FLEXTOUCH U-200) 64 Units, Subcutaneous, Daily    lisinopriL 10 mg, Oral, Daily    metFORMIN (GLUCOPHAGE) 1,000 mg, Oral, 2 times daily with meals    metoprolol tartrate (LOPRESSOR) 50 mg, Oral, 2 times daily    sodium bicarbonate 650 mg, Oral, 2 times daily    tamsulosin (FLOMAX) 0.4 mg, Oral, 2 times daily        All medications, laboratory studies, cardiac diagnostic imaging reviewed.     Lab Results   Component Value Date    LDL 62.00 08/28/2023    TRIG 70 08/28/2023    CREATININE 1.52 (H) 03/12/2024    MG 1.80 07/03/2023    K 5.0 03/12/2024        ASSESSMENT/PLAN:    CAD (coronary artery disease)  Endorses ongoing stable SOB/BRYAN that is his baseline, not  progressed from prior  Denies CP, heaviness, tightness, dizziness, or syncope  NSTEMI s/p PCI left circumflex in 2/2016  STEMI s/p PTCA/BHAKTI distal RCA on 9/2017   Lexiscan Stress Test October 2018 - negative for ischemia  Continue baby aspirin, plavix, lisinopril, metoprolol, and atorvastatin   Counseled on lifestyle modifications with diet, exercise, and smoking cessation     Left leg pain  Claudication  Reports claudication with ambulation. Describes it as a burning, heaviness pain  Reports intermittent pain with ambulation. Also has sharp shooting pain at rest that appears to be related to neuropathy  Arterial Insufficiency US on 10.10.22 reveled moderate arterial flow reduction in right lower extremity and moderately severe arterial flow reduction in the left lower extremity.  Repeat arterial ultrasounds were ordered at last office visit, however patient did not complete   Patient was also referred to vascular surgery per PCP in September of 2023, however patient did not go to visit   Strongly encouraged patient to complete the arterial insufficiency ultrasounds and to reach out to vascular surgery to make appointment    HTN (hypertension)  Controlled 90/59  Other blood pressure readings today with hypotension.  Patient denying any symptoms at this time   We will decrease Lisinopril to 10 mg daily.  Patient to return to clinic in 2-3 weeks for blood pressure/symptom check, we will make medication adjustments as necessary  continue current medical management  Counseled on a low sodium, low cholesterol diet    HLD (hyperlipidemia)  LDL at goal - 62 per labs August 2023  Continue Atorvastatin 80mg by mouth daily  Counseled patient on low-cholesterol, low-fat diet, and encourage exercise as tolerated.    Diabetes  Uncontrolled - last A1c 13.9  Management per PCP    Tobacco Abuse  Continues to smoke approximately half a pack to one pack a day.  He states that 1 pack typically lasts him about a day and a  half  Counseled on smoking cessation       Decrease lisinopril to 10 mg daily   Return to clinic for nurse visit for blood pressure/symptom check.  Please take all medications prior to visit  Complete arterial ultrasounds of bilateral lower extremities   Follow up with vascular surgery   Follow up in cardiology clinic in 6 months or sooner if needed   Follow up with PCP as directed

## 2024-03-12 ENCOUNTER — OFFICE VISIT (OUTPATIENT)
Dept: CARDIOLOGY | Facility: CLINIC | Age: 69
End: 2024-03-12
Payer: MEDICARE

## 2024-03-12 ENCOUNTER — OFFICE VISIT (OUTPATIENT)
Dept: INTERNAL MEDICINE | Facility: CLINIC | Age: 69
End: 2024-03-12
Payer: MEDICARE

## 2024-03-12 ENCOUNTER — LAB VISIT (OUTPATIENT)
Dept: LAB | Facility: HOSPITAL | Age: 69
End: 2024-03-12
Attending: NURSE PRACTITIONER
Payer: MEDICARE

## 2024-03-12 VITALS
DIASTOLIC BLOOD PRESSURE: 64 MMHG | RESPIRATION RATE: 20 BRPM | SYSTOLIC BLOOD PRESSURE: 96 MMHG | BODY MASS INDEX: 37.65 KG/M2 | HEART RATE: 89 BPM | WEIGHT: 263 LBS | TEMPERATURE: 98 F | HEIGHT: 70 IN

## 2024-03-12 VITALS
DIASTOLIC BLOOD PRESSURE: 59 MMHG | SYSTOLIC BLOOD PRESSURE: 90 MMHG | HEIGHT: 70 IN | TEMPERATURE: 98 F | RESPIRATION RATE: 18 BRPM | OXYGEN SATURATION: 95 % | HEART RATE: 88 BPM | WEIGHT: 264.31 LBS | BODY MASS INDEX: 37.84 KG/M2

## 2024-03-12 DIAGNOSIS — E11.65 TYPE 2 DIABETES MELLITUS WITH HYPERGLYCEMIA, WITH LONG-TERM CURRENT USE OF INSULIN: ICD-10-CM

## 2024-03-12 DIAGNOSIS — E11.65 HYPERGLYCEMIA DUE TO DIABETES MELLITUS: Primary | ICD-10-CM

## 2024-03-12 DIAGNOSIS — E78.5 HYPERLIPIDEMIA LDL GOAL <70: ICD-10-CM

## 2024-03-12 DIAGNOSIS — I73.9 PERIPHERAL ARTERY DISEASE: ICD-10-CM

## 2024-03-12 DIAGNOSIS — M79.605 PAIN IN BOTH LOWER EXTREMITIES: ICD-10-CM

## 2024-03-12 DIAGNOSIS — Z79.4 TYPE 2 DIABETES MELLITUS WITH HYPERGLYCEMIA, WITH LONG-TERM CURRENT USE OF INSULIN: ICD-10-CM

## 2024-03-12 DIAGNOSIS — E66.01 SEVERE OBESITY (BMI 35.0-39.9) WITH COMORBIDITY: ICD-10-CM

## 2024-03-12 DIAGNOSIS — F17.219 CIGARETTE NICOTINE DEPENDENCE WITH NICOTINE-INDUCED DISORDER: ICD-10-CM

## 2024-03-12 DIAGNOSIS — I25.10 CORONARY ARTERY DISEASE, UNSPECIFIED VESSEL OR LESION TYPE, UNSPECIFIED WHETHER ANGINA PRESENT, UNSPECIFIED WHETHER NATIVE OR TRANSPLANTED HEART: ICD-10-CM

## 2024-03-12 DIAGNOSIS — I73.9 PERIPHERAL ARTERY DISEASE: Primary | ICD-10-CM

## 2024-03-12 DIAGNOSIS — M79.604 PAIN IN BOTH LOWER EXTREMITIES: ICD-10-CM

## 2024-03-12 DIAGNOSIS — E11.40 TYPE 2 DIABETES MELLITUS WITH DIABETIC NEUROPATHY, WITHOUT LONG-TERM CURRENT USE OF INSULIN: ICD-10-CM

## 2024-03-12 DIAGNOSIS — I10 PRIMARY HYPERTENSION: ICD-10-CM

## 2024-03-12 LAB
ALBUMIN SERPL-MCNC: 3.5 G/DL (ref 3.4–4.8)
ALBUMIN/GLOB SERPL: 0.8 RATIO (ref 1.1–2)
ALP SERPL-CCNC: 154 UNIT/L (ref 40–150)
ALT SERPL-CCNC: 92 UNIT/L (ref 0–55)
AST SERPL-CCNC: 79 UNIT/L (ref 5–34)
BASOPHILS # BLD AUTO: 0.07 X10(3)/MCL
BASOPHILS NFR BLD AUTO: 0.8 %
BILIRUB SERPL-MCNC: 0.4 MG/DL
BUN SERPL-MCNC: 38.1 MG/DL (ref 8.4–25.7)
CALCIUM SERPL-MCNC: 9.6 MG/DL (ref 8.8–10)
CHLORIDE SERPL-SCNC: 102 MMOL/L (ref 98–107)
CO2 SERPL-SCNC: 26 MMOL/L (ref 23–31)
CREAT SERPL-MCNC: 1.52 MG/DL (ref 0.73–1.18)
CREAT UR-MCNC: 128.3 MG/DL (ref 63–166)
EOSINOPHIL # BLD AUTO: 0.28 X10(3)/MCL (ref 0–0.9)
EOSINOPHIL NFR BLD AUTO: 3 %
ERYTHROCYTE [DISTWIDTH] IN BLOOD BY AUTOMATED COUNT: 20.7 % (ref 11.5–17)
EST. AVERAGE GLUCOSE BLD GHB EST-MCNC: 352.2 MG/DL
GFR SERPLBLD CREATININE-BSD FMLA CKD-EPI: 49 MLS/MIN/1.73/M2
GLOBULIN SER-MCNC: 4.5 GM/DL (ref 2.4–3.5)
GLUCOSE SERPL-MCNC: 408 MG/DL (ref 82–115)
HBA1C MFR BLD: 13.9 %
HCT VFR BLD AUTO: 40.8 % (ref 42–52)
HGB BLD-MCNC: 12.6 G/DL (ref 14–18)
IMM GRANULOCYTES # BLD AUTO: 0.03 X10(3)/MCL (ref 0–0.04)
IMM GRANULOCYTES NFR BLD AUTO: 0.3 %
LYMPHOCYTES # BLD AUTO: 2.87 X10(3)/MCL (ref 0.6–4.6)
LYMPHOCYTES NFR BLD AUTO: 31 %
MCH RBC QN AUTO: 21.8 PG (ref 27–31)
MCHC RBC AUTO-ENTMCNC: 30.9 G/DL (ref 33–36)
MCV RBC AUTO: 70.6 FL (ref 80–94)
MICROALBUMIN UR-MCNC: 52.4 UG/ML
MICROALBUMIN/CREAT RATIO PNL UR: 40.8 MG/GM CR (ref 0–30)
MONOCYTES # BLD AUTO: 0.76 X10(3)/MCL (ref 0.1–1.3)
MONOCYTES NFR BLD AUTO: 8.2 %
NEUTROPHILS # BLD AUTO: 5.24 X10(3)/MCL (ref 2.1–9.2)
NEUTROPHILS NFR BLD AUTO: 56.7 %
NRBC BLD AUTO-RTO: 0 %
PLATELET # BLD AUTO: 257 X10(3)/MCL (ref 130–400)
PLATELETS.RETICULATED NFR BLD AUTO: 6.5 % (ref 0.9–11.2)
PMV BLD AUTO: 10.5 FL (ref 7.4–10.4)
POTASSIUM SERPL-SCNC: 5 MMOL/L (ref 3.5–5.1)
PROT SERPL-MCNC: 8 GM/DL (ref 5.8–7.6)
RBC # BLD AUTO: 5.78 X10(6)/MCL (ref 4.7–6.1)
SODIUM SERPL-SCNC: 139 MMOL/L (ref 136–145)
WBC # SPEC AUTO: 9.25 X10(3)/MCL (ref 4.5–11.5)

## 2024-03-12 PROCEDURE — 99214 OFFICE O/P EST MOD 30 MIN: CPT | Mod: S$PBB,,, | Performed by: NURSE PRACTITIONER

## 2024-03-12 PROCEDURE — 99215 OFFICE O/P EST HI 40 MIN: CPT | Mod: PBBFAC

## 2024-03-12 PROCEDURE — 85025 COMPLETE CBC W/AUTO DIFF WBC: CPT

## 2024-03-12 PROCEDURE — 80053 COMPREHEN METABOLIC PANEL: CPT

## 2024-03-12 PROCEDURE — 36415 COLL VENOUS BLD VENIPUNCTURE: CPT

## 2024-03-12 PROCEDURE — 83036 HEMOGLOBIN GLYCOSYLATED A1C: CPT

## 2024-03-12 PROCEDURE — 99214 OFFICE O/P EST MOD 30 MIN: CPT | Mod: S$PBB,,,

## 2024-03-12 PROCEDURE — 82043 UR ALBUMIN QUANTITATIVE: CPT

## 2024-03-12 PROCEDURE — 99215 OFFICE O/P EST HI 40 MIN: CPT | Mod: PBBFAC,27 | Performed by: NURSE PRACTITIONER

## 2024-03-12 RX ORDER — PEN NEEDLE, DIABETIC 31 GX5/16"
1 NEEDLE, DISPOSABLE MISCELLANEOUS
COMMUNITY
Start: 2023-11-20

## 2024-03-12 RX ORDER — LISINOPRIL 10 MG/1
10 TABLET ORAL DAILY
Qty: 90 TABLET | Refills: 3 | Status: SHIPPED | OUTPATIENT
Start: 2024-03-12 | End: 2025-03-12

## 2024-03-12 RX ORDER — METOPROLOL TARTRATE 50 MG/1
50 TABLET ORAL 2 TIMES DAILY
Qty: 180 TABLET | Refills: 3 | Status: SHIPPED | OUTPATIENT
Start: 2024-03-12 | End: 2025-03-12

## 2024-03-12 NOTE — ASSESSMENT & PLAN NOTE
BP Readings from Last 3 Encounters:   03/12/24 96/64   03/12/24 (!) 90/59   01/30/24 120/75   Bp medications adjusted by cardiologist this morning     Follow low sodium diet, < 2 gm/day (avoid high salty foods such as processed meats/ sausage/hurtado/ sandwich meat, chips, pickles, cheese, crackers and soft drinks/ electrolyte replacement drinks).  Avoid tobacco/ alcohol use  Educated on health benefits of at least 5 days/ week of 30 minutes moderate intensity exercise (brisk walking) and 2 or more days/ week of muscle strength activities  Daily ASA 81 mg for CV prevention  Continue current medication regimen

## 2024-03-12 NOTE — PATIENT INSTRUCTIONS
Decrease lisinopril to 10 mg daily   Return to clinic for nurse visit for blood pressure/symptom check.  Please take all medications prior to visit  Complete arterial ultrasounds of bilateral lower extremities   Follow up in cardiology clinic in 6 months or sooner if needed   Follow up with PCP as directed

## 2024-03-12 NOTE — ASSESSMENT & PLAN NOTE
BMI 37.74  Educated on increased risk of disease s/t obesity.  Educated on health benefits of at least 5 days/ week of 30 minutes moderate intensity exercise (brisk walking) and 2 or more days/ week of muscle strength activities (as tolerated).  Eat well balanced diet of fresh fruits/ vegetables, whole grains, lean meats and limit high carbohydrate foods.

## 2024-03-12 NOTE — ASSESSMENT & PLAN NOTE
A1c worsened to 13.9%; prior A1c 9.4% 8/28/23  CBG reports from  remains high upper 200-400 and blood sugar this morning 408  Patient admits to non compliance with diet and reports compliance with medications. Non compliant with CBG checks.  Again reviewed ADA diet with patient today discussed portion control and to limit carbohydrates and choose complex carbs.  Referral to diabetes education ordered previously  DM eye exam: Fundus photos  8/30/23- no report ?    DM foot exam: 8/1/23  Discussed with pt concern of hyperglycemia and presentation of mild drowsiness noted upon exam. Informed patient risk of associated hyperglycemia, risk of end organ damage, ZORAIDA, DKA, death, etc. Recommended he seek further evaluation and treatment in ER today however he adamantly refuses to be seen in ER and wishes to go home. He signed AMA prior to leaving and was calling a family member to bring him home as he did not drive self to visit today. He plans to go home and take medications as prescribed.

## 2024-03-12 NOTE — PROGRESS NOTES
Cammy L Sammy, NP   [facility]  [address] [facility] [address]      PATIENT NAME: John Addison  : 1955  DATE: 3/12/24  MRN: [id number]        History of Present Illness / Problem Focused Workflow     John Addison presents to the clinic with Results     HPI: 3/12/24:  Last visit note 10/31/2023. PMH includes HTN, HLD, CAD/NSTEMI (2016) s/p PCI of left circumflex, inferior wall STEMI s/p PTCA/BHAKTI of distal RCA 2017, type 2 DM, elevated LFTs, lymphadenopathy, tobacco abuse, and morbid obesity. 70 yo AAM for follow up/ lab results. Glucose remains elevated 408 this morning with A1c worsened to 13.9%. BP low this morning at 96/64. He was seen by Cardiology clinic immediately prior to this morning and lisinopril decreased to 10 mg.  When asked how he is feeling he states he has a little tired and is having burning pain to his legs when he walks for long period of time.   He still has not completed ABIs as previously ordered.  He reports he is taking his medications as prescribed.  He states his daughter and/or granddaughter administers his insulin.  He has not checking blood sugars.  He no longer has home health.  He seems drowsy today but states he feels fine.  Encouraged patient to seek treatment in the ER due to elevated blood sugars with concern for complications such as end-organ damage, DKA, death etc., however patient  adamantly refuses to go to the ER and is in agreement to sign AMA form.    Other providers:  [facility]  [facility]  [facility] Pulmonology- last visit 21  [facility]   Dr. Romero/ Dr. Juarez Podiatry  [facility] End[facility]  Dr. Smith Lay- General Surgeon    Review of Systems     Review of Systems   Constitutional: Negative.    HENT: Negative.     Eyes: Negative.    Respiratory: Negative.     Cardiovascular: Negative.         B/l leg pain    Gastrointestinal: Negative.    Endocrine: Negative.    Genitourinary:  Negative.    Musculoskeletal: Negative.    Skin: Negative.    Allergic/Immunologic: Negative.    Neurological: Negative.    Hematological: Negative.    Psychiatric/Behavioral: Negative.         Medical / Social / Family History     ----------------------------  COPD (chronic obstructive pulmonary disease)  Coronary artery disease  Diabetes mellitus  hyperlipidemia  Hypertension     Past Surgical History:   Procedure Laterality Date    CORONARY ANGIOPLASTY      EXCISION, LESION, LOWER EXTREMITY Left 5/25/2023    Procedure: EXCISION, LESION, LOWER EXTREMITY;  Surgeon: South Terry MD;  Location: [facility] OR;  Service: General;  Laterality: Left;    TONSILLECTOMY  07/2018    WOUND DEBRIDEMENT N/A 5/10/2023    Procedure: DEBRIDEMENT, WOUND;  Surgeon: Smith Lay MD;  Location: [facility] OR;  Service: General;  Laterality: N/A;  perineum debridement, lithotomy       Social History     Socioeconomic History    Marital status: Single   Tobacco Use    Smoking status: Every Day     Current packs/day: 0.50     Average packs/day: 0.5 packs/day for 59.8 years (29.9 ttl pk-yrs)     Types: Cigarettes     Start date: 5/8/1964    Smokeless tobacco: Never   Substance and Sexual Activity    Alcohol use: Yes     Comment: beer 2x/month    Drug use: Yes     Types: Marijuana    Sexual activity: Not Currently     Social Determinants of Health     Financial Resource Strain: Low Risk  (5/19/2023)    Overall Financial Resource Strain (CARDIA)     Difficulty of Paying Living Expenses: Not hard at all   Food Insecurity: No Food Insecurity (5/19/2023)    Hunger Vital Sign     Worried About Running Out of Food in the Last Year: Never true     Ran Out of Food in the Last Year: Never true   Transportation Needs: No Transportation Needs (5/19/2023)    PRAPARE - Transportation     Lack of Transportation (Medical): No     Lack of Transportation (Non-Medical): No   Physical Activity: Inactive (5/19/2023)    Exercise Vital Sign     Days of  "Exercise per Week: 0 days     Minutes of Exercise per Session: 0 min   Stress: No Stress Concern Present (5/19/2023)    [place] [employer] - Occupational Stress Questionnaire     Feeling of Stress : Not at all   Social Connections: Moderately Isolated (6/16/2023)    Social Connection and Isolation Panel [NHANES]     Frequency of Communication with Friends and Family: More than three times a week     Frequency of Social Gatherings with Friends and Family: Three times a week     Attends Hindu Services: More than 4 times per year     Active Member of Clubs or Organizations: No     Attends Club or Organization Meetings: Never     Marital Status:    Housing Stability: Low Risk  (5/19/2023)    Housing Stability Vital Sign     Unable to Pay for Housing in the Last Year: No     Number of Places Lived in the Last Year: 1     Unstable Housing in the Last Year: No        Family History   Problem Relation Age of Onset    Hypertension Mother     Heart failure Mother     Heart attack Mother     Coronary artery disease Mother     Cancer Father     Cancer Sister         Medications and Allergies     Medications  Current Outpatient Medications   Medication Instructions    albuterol (PROVENTIL/VENTOLIN HFA) 90 mcg/actuation inhaler 2 puffs, Inhalation, As needed (PRN)    aspirin 81 mg, Oral, Daily    atorvastatin (LIPITOR) 80 mg, Oral, Daily    BD ULTRA-FINE GOOD PEN NEEDLE 32 gauge x 5/32" Ndle 1 Syringe, Subcutaneous    clopidogreL (PLAVIX) 75 mg, Oral, Daily    docusate sodium (COLACE) 100 mg, Oral, As needed (PRN)    dulaglutide (TRULICITY) 3 mg, Subcutaneous, Every 7 days    famotidine (PEPCID) 20 mg, Oral, 2 times daily    ferrous sulfate 324 mg (65 mg iron) TbEC Oral, 2 times daily    finasteride (PROSCAR) 5 mg, Oral, Daily    fluticasone-salmeterol diskus inhaler 250-50 mcg 1 puff, Inhalation, 2 times daily, Controller    folic acid (FOLVITE) 1 mg, Oral, Daily    gabapentin (NEURONTIN) 300 " "mg, Oral, 3 times daily PRN    glipiZIDE (GLUCOTROL) 10 mg, Oral, With breakfast    insulin degludec (TRESIBA FLEXTOUCH U-200) 64 Units, Subcutaneous, Daily    lisinopriL 10 mg, Oral, Daily    metFORMIN (GLUCOPHAGE) 1,000 mg, Oral, 2 times daily with meals    metoprolol tartrate (LOPRESSOR) 50 mg, Oral, 2 times daily    sodium bicarbonate 650 mg, Oral, 2 times daily    tamsulosin (FLOMAX) 0.4 mg, Oral, 2 times daily       Allergies  Review of patient's allergies indicates:  No Known Allergies    Physical Examination     Visit Vitals  BP 96/64 (BP Location: Left arm, Patient Position: Sitting, BP Method: X-Large (Manual))   Pulse 89   Temp 97.7 °F (36.5 °C) (Oral)   Resp 20   Ht 5' 10" (1.778 m)   Wt 119.3 kg (263 lb)   BMI 37.74 kg/m²       Physical Exam  Constitutional:       General: He is not in acute distress.     Appearance: Normal appearance. He is normal weight. He is not ill-appearing, toxic-appearing or diaphoretic.   HENT:      Head: Normocephalic.   Cardiovascular:      Rate and Rhythm: Normal rate and regular rhythm.      Pulses:           Dorsalis pedis pulses are 2+ on the right side and 2+ on the left side.        Posterior tibial pulses are 2+ on the right side and 2+ on the left side.      Heart sounds: Murmur heard.   Pulmonary:      Effort: Pulmonary effort is normal. No respiratory distress.      Breath sounds: Normal breath sounds.   Abdominal:      General: Bowel sounds are normal. There is no distension.      Palpations: Abdomen is soft.   Musculoskeletal:      Right lower leg: No edema.      Left lower leg: No edema.      Comments: Ambulating without difficulty with cane   Lymphadenopathy:      Cervical: No cervical adenopathy.   Skin:     General: Skin is warm and dry.   Neurological:      Mental Status: He is alert and oriented to person, place, and time.      Sensory: No sensory deficit.      Motor: No weakness.      Coordination: Coordination normal.      Gait: Gait normal.   Psychiatric: " "        Mood and Affect: Mood normal.         Behavior: Behavior normal.         Thought Content: Thought content normal.         Judgment: Judgment normal.           Results     Lab Results   Component Value Date    WBC 9.25 03/12/2024    RBC 5.78 03/12/2024    HGB 12.6 (L) 03/12/2024    HCT 40.8 (L) 03/12/2024    MCV 70.6 (L) 03/12/2024    MCH 21.8 (L) 03/12/2024    MCHC 30.9 (L) 03/12/2024    RDW 20.7 (H) 03/12/2024     03/12/2024    MPV 10.5 (H) 03/12/2024     Sodium Level   Date Value Ref Range Status   03/12/2024 139 136 - 145 mmol/L Final     Potassium Level   Date Value Ref Range Status   03/12/2024 5.0 3.5 - 5.1 mmol/L Final     Carbon Dioxide   Date Value Ref Range Status   03/12/2024 26 23 - 31 mmol/L Final     Blood Urea Nitrogen   Date Value Ref Range Status   03/12/2024 38.1 (H) 8.4 - 25.7 mg/dL Final     Creatinine   Date Value Ref Range Status   03/12/2024 1.52 (H) 0.73 - 1.18 mg/dL Final     Calcium Level Total   Date Value Ref Range Status   03/12/2024 9.6 8.8 - 10.0 mg/dL Final     Albumin Level   Date Value Ref Range Status   03/12/2024 3.5 3.4 - 4.8 g/dL Final     Bilirubin Total   Date Value Ref Range Status   03/12/2024 0.4 <=1.5 mg/dL Final     Alkaline Phosphatase   Date Value Ref Range Status   03/12/2024 154 (H) 40 - 150 unit/L Final     Aspartate Aminotransferase   Date Value Ref Range Status   03/12/2024 79 (H) 5 - 34 unit/L Final     Alanine Aminotransferase   Date Value Ref Range Status   03/12/2024 92 (H) 0 - 55 unit/L Final     Estimated GFR-Non    Date Value Ref Range Status   06/26/2018 65 (L) >>=90 mL/min Final     Lab Results   Component Value Date    CHOL 110 08/28/2023     Lab Results   Component Value Date    HDL 34 (L) 08/28/2023     No results found for: "LDLCALC"  Lab Results   Component Value Date    TRIG 70 08/28/2023     No results found for: "CHOLHDL"  No results found for: "TSH"  Lab Results   Component Value Date    PHUR 6.0 07/17/2023    " "SPECGRAV 1.030 07/17/2023    PROTEINUA Negative 06/16/2023    KETONESU neg 07/17/2023    NITRITE pos 07/17/2023    LEUKOCYTESUR Negative 06/16/2023     Lab Results   Component Value Date    HGBA1C 13.9 (H) 03/12/2024    HGBA1C 9.4 (H) 08/28/2023    HGBA1C 9.7 (H) 05/10/2023     No results found for: "MICROALBUR", "[id number]"   Results for orders placed in visit on 08/22/22    Diabetic Eye Screening Photo         Assessment       ICD-10-CM ICD-9-CM   1. Hyperglycemia due to diabetes mellitus  E11.65 250.02   2. Cigarette nicotine dependence with nicotine-induced disorder  F17.219 292.9   3. Pain in both lower extremities  M79.604 729.5    M79.605    4. Severe obesity (BMI 35.0-39.9) with comorbidity  E66.01 278.01   5. Primary hypertension  I10 401.9   6. Peripheral artery disease  I73.9 443.9   7. Type 2 diabetes mellitus with hyperglycemia, with long-term current use of insulin  E11.65 250.00    Z79.4 790.29     V58.67       Plan       Problem List Items Addressed This Visit          Cardiac/Vascular    Hypertension    Current Assessment & Plan     BP Readings from Last 3 Encounters:   03/12/24 96/64   03/12/24 (!) 90/59   01/30/24 120/75   Bp medications adjusted by cardiologist this morning     Follow low sodium diet, < 2 gm/day (avoid high salty foods such as processed meats/ sausage/hurtado/ sandwich meat, chips, pickles, cheese, crackers and soft drinks/ electrolyte replacement drinks).  Avoid tobacco/ alcohol use  Educated on health benefits of at least 5 days/ week of 30 minutes moderate intensity exercise (brisk walking) and 2 or more days/ week of muscle strength activities  Daily ASA 81 mg for CV prevention  Continue current medication regimen           Peripheral artery disease    Current Assessment & Plan     Reminded pt to complete arterial US as he continues to endorse b/l leg pain             Endocrine    Hyperglycemia due to diabetes mellitus - Primary    Severe obesity (BMI 35.0-39.9) with " comorbidity    Current Assessment & Plan     BMI 37.74  Educated on increased risk of disease s/t obesity.  Educated on health benefits of at least 5 days/ week of 30 minutes moderate intensity exercise (brisk walking) and 2 or more days/ week of muscle strength activities (as tolerated).  Eat well balanced diet of fresh fruits/ vegetables, whole grains, lean meats and limit high carbohydrate foods.            Type 2 diabetes mellitus with hyperglycemia, with long-term current use of insulin    Current Assessment & Plan     A1c worsened to 13.9%; prior A1c 9.4% 8/28/23  CBG reports from  remains high upper 200-400 and blood sugar this morning 408  Patient admits to non compliance with diet and reports compliance with medications. Non compliant with CBG checks.  Again reviewed ADA diet with patient today discussed portion control and to limit carbohydrates and choose complex carbs.  Referral to diabetes education ordered previously  DM eye exam: Fundus photos  8/30/23- no report ?    DM foot exam: 8/1/23  Discussed with pt concern of hyperglycemia and presentation of mild drowsiness noted upon exam. Informed patient risk of associated hyperglycemia, risk of end organ damage, ZORAIDA, DKA, death, etc. Recommended he seek further evaluation and treatment in ER today however he adamantly refuses to be seen in ER and wishes to go home. He signed AMA prior to leaving and was calling a family member to bring him home as he did not drive self to visit today. He plans to go home and take medications as prescribed.            Orthopedic    Pain in both lower extremities       Other    Cigarette nicotine dependence with nicotine-induced disorder    Current Assessment & Plan     0.5 ppd  Discussed for at least 3 minutes importance of smoking cessation and health benefits, including adverse effects to patient's health and organs.  Encouraged cessation.              Future Appointments   Date Time Provider Department Center    4/9/2024 12:40 PM [facility]   4/22/2024  9:15 AM [facility]UR[facility]   9/12/2024 10:15 AM Nissa De La Rosa, [facility]        Follow up in about 2 weeks (around 3/26/2024) for diabetes, with fasting labs before visit.    Signature:  Cammy Christianson NP  [facility]  [address] [facility] [address]    Date of encounter: 3/12/24

## 2024-06-07 ENCOUNTER — PATIENT OUTREACH (OUTPATIENT)
Facility: CLINIC | Age: 69
End: 2024-06-07
Payer: MEDICARE

## 2024-06-07 NOTE — PROGRESS NOTES
Health Maintenance Topic(s) Outreach Outcomes & Actions Taken:    Colorectal Cancer Screening - Outreach Outcomes & Actions Taken  : Cologuard ordered 8/30/2024 per pcp. Not returned.     Additional Notes:  No future appt scheduled. Past 2 visits cancelled & no showed.  messaged to contact for appt.   AWV due.        Care Management, Digital Medicine, and/or Education Referrals      Next Steps - Referral Actions: Risk score 60.4%. Decreased -2.3 last 6 months. Score improving. No referral placed at this time.

## 2024-06-10 ENCOUNTER — TELEPHONE (OUTPATIENT)
Dept: INTERNAL MEDICINE | Facility: CLINIC | Age: 69
End: 2024-06-10
Payer: MEDICARE

## 2024-06-19 ENCOUNTER — TELEPHONE (OUTPATIENT)
Dept: INTERNAL MEDICINE | Facility: CLINIC | Age: 69
End: 2024-06-19
Payer: MEDICARE

## 2024-06-19 DIAGNOSIS — E11.40 TYPE 2 DIABETES MELLITUS WITH DIABETIC NEUROPATHY, WITHOUT LONG-TERM CURRENT USE OF INSULIN: ICD-10-CM

## 2024-06-19 NOTE — TELEPHONE ENCOUNTER
----- Message from Lora Tyler MA sent at 6/19/2024  2:59 PM CDT -----  Regarding: FW: BONNY Christianson  Is there another medication you would like pt to try? Please advise.  ----- Message -----  From: Tobin Yang  Sent: 6/18/2024  11:22 AM CDT  To: #  Subject: BONNY Christianson                                        Patient has been unable to fill dulaglutide (TRULICITY) 3 mg/0.5 mL pen injector  Pharmacy is requesting for a different brand , patient has been out of insulin for a about a week   Please call patient once medication is filled

## 2024-06-19 NOTE — TELEPHONE ENCOUNTER
Please clarify if Trulicity or Tresiba? Trulicity is not insulin. Tresiba is insulin. Which medication is he needing? Please find out alternatives that his insurance will cover?     Thank you

## 2024-06-20 RX ORDER — INSULIN DEGLUDEC 200 U/ML
64 INJECTION, SOLUTION SUBCUTANEOUS DAILY
Qty: 3 PEN | Refills: 1 | Status: SHIPPED | OUTPATIENT
Start: 2024-06-20

## 2024-06-20 NOTE — TELEPHONE ENCOUNTER
Called and spoke to pts daughter Chen and she stated it is his Tresiba he needs a refill on. Proposed refill request. Please advise.

## 2024-08-27 ENCOUNTER — OFFICE VISIT (OUTPATIENT)
Dept: INTERNAL MEDICINE | Facility: CLINIC | Age: 69
End: 2024-08-27
Payer: MEDICARE

## 2024-08-27 ENCOUNTER — CLINICAL SUPPORT (OUTPATIENT)
Dept: INTERNAL MEDICINE | Facility: CLINIC | Age: 69
End: 2024-08-27
Attending: NURSE PRACTITIONER
Payer: MEDICARE

## 2024-08-27 VITALS
BODY MASS INDEX: 35.6 KG/M2 | HEIGHT: 70 IN | TEMPERATURE: 98 F | DIASTOLIC BLOOD PRESSURE: 74 MMHG | OXYGEN SATURATION: 96 % | WEIGHT: 248.63 LBS | RESPIRATION RATE: 16 BRPM | SYSTOLIC BLOOD PRESSURE: 108 MMHG | HEART RATE: 99 BPM

## 2024-08-27 DIAGNOSIS — B35.1 ONYCHOMYCOSIS OF MULTIPLE TOENAILS WITH TYPE 2 DIABETES MELLITUS: ICD-10-CM

## 2024-08-27 DIAGNOSIS — Q16.5 TEGMEN DEFECT OF BASE OF SKULL: ICD-10-CM

## 2024-08-27 DIAGNOSIS — E11.40 TYPE 2 DIABETES MELLITUS WITH DIABETIC NEUROPATHY, WITHOUT LONG-TERM CURRENT USE OF INSULIN: ICD-10-CM

## 2024-08-27 DIAGNOSIS — E11.9 ENCOUNTER FOR DIABETIC FOOT EXAM: ICD-10-CM

## 2024-08-27 DIAGNOSIS — H10.45 CHRONIC ALLERGIC CONJUNCTIVITIS: Primary | ICD-10-CM

## 2024-08-27 DIAGNOSIS — R91.8 ABNORMAL FINDINGS ON DIAGNOSTIC IMAGING OF LUNG: ICD-10-CM

## 2024-08-27 DIAGNOSIS — Z01.00 ENCOUNTER FOR DIABETES TYPE 2 EYE EXAM: ICD-10-CM

## 2024-08-27 DIAGNOSIS — F17.219 CIGARETTE NICOTINE DEPENDENCE WITH NICOTINE-INDUCED DISORDER: ICD-10-CM

## 2024-08-27 DIAGNOSIS — N40.0 BENIGN PROSTATIC HYPERPLASIA, UNSPECIFIED WHETHER LOWER URINARY TRACT SYMPTOMS PRESENT: ICD-10-CM

## 2024-08-27 DIAGNOSIS — Z79.4 TYPE 2 DIABETES MELLITUS WITH HYPERGLYCEMIA, WITH LONG-TERM CURRENT USE OF INSULIN: ICD-10-CM

## 2024-08-27 DIAGNOSIS — E11.65 TYPE 2 DIABETES MELLITUS WITH HYPERGLYCEMIA, WITH LONG-TERM CURRENT USE OF INSULIN: ICD-10-CM

## 2024-08-27 DIAGNOSIS — E11.9 ENCOUNTER FOR DIABETES TYPE 2 EYE EXAM: ICD-10-CM

## 2024-08-27 DIAGNOSIS — F17.210 CIGARETTE SMOKER: ICD-10-CM

## 2024-08-27 DIAGNOSIS — I73.9 PERIPHERAL ARTERY DISEASE: ICD-10-CM

## 2024-08-27 DIAGNOSIS — Z76.0 ENCOUNTER FOR MEDICATION REFILL: ICD-10-CM

## 2024-08-27 DIAGNOSIS — I10 PRIMARY HYPERTENSION: ICD-10-CM

## 2024-08-27 DIAGNOSIS — E11.69 ONYCHOMYCOSIS OF MULTIPLE TOENAILS WITH TYPE 2 DIABETES MELLITUS: ICD-10-CM

## 2024-08-27 DIAGNOSIS — J44.9 CHRONIC OBSTRUCTIVE PULMONARY DISEASE, UNSPECIFIED COPD TYPE: ICD-10-CM

## 2024-08-27 DIAGNOSIS — I10 HYPERTENSION, UNSPECIFIED TYPE: ICD-10-CM

## 2024-08-27 DIAGNOSIS — E78.5 HYPERLIPIDEMIA LDL GOAL <70: ICD-10-CM

## 2024-08-27 DIAGNOSIS — Z12.5 PROSTATE CANCER SCREENING: ICD-10-CM

## 2024-08-27 DIAGNOSIS — I25.10 CORONARY ARTERY DISEASE, UNSPECIFIED VESSEL OR LESION TYPE, UNSPECIFIED WHETHER ANGINA PRESENT, UNSPECIFIED WHETHER NATIVE OR TRANSPLANTED HEART: ICD-10-CM

## 2024-08-27 PROBLEM — N49.3 FOURNIER'S GANGRENE OF SCROTUM: Status: RESOLVED | Noted: 2023-05-23 | Resolved: 2024-08-27

## 2024-08-27 PROBLEM — H91.90 HEARING LOSS: Status: ACTIVE | Noted: 2024-08-27

## 2024-08-27 PROBLEM — L02.214 ABSCESS OF GROIN, LEFT: Status: RESOLVED | Noted: 2023-10-31 | Resolved: 2024-08-27

## 2024-08-27 PROBLEM — L02.91 ABSCESS: Status: RESOLVED | Noted: 2023-08-30 | Resolved: 2024-08-27

## 2024-08-27 PROBLEM — N49.3 FOURNIER'S GANGRENE: Status: RESOLVED | Noted: 2023-05-16 | Resolved: 2024-08-27

## 2024-08-27 PROCEDURE — 99215 OFFICE O/P EST HI 40 MIN: CPT | Mod: 25,S$PBB,, | Performed by: NURSE PRACTITIONER

## 2024-08-27 PROCEDURE — 99406 BEHAV CHNG SMOKING 3-10 MIN: CPT | Mod: S$PBB,,, | Performed by: NURSE PRACTITIONER

## 2024-08-27 PROCEDURE — 92228 IMG RTA DETC/MNTR DS PHY/QHP: CPT | Mod: TC,PBBFAC

## 2024-08-27 PROCEDURE — 99215 OFFICE O/P EST HI 40 MIN: CPT | Mod: PBBFAC | Performed by: NURSE PRACTITIONER

## 2024-08-27 RX ORDER — CLOPIDOGREL BISULFATE 75 MG/1
75 TABLET ORAL DAILY
Qty: 90 TABLET | Refills: 1 | Status: SHIPPED | OUTPATIENT
Start: 2024-08-27 | End: 2025-08-27

## 2024-08-27 RX ORDER — ALBUTEROL SULFATE 90 UG/1
2 INHALANT RESPIRATORY (INHALATION) EVERY 6 HOURS PRN
Qty: 18 G | Refills: 5 | Status: SHIPPED | OUTPATIENT
Start: 2024-08-27

## 2024-08-27 RX ORDER — GLIPIZIDE 10 MG/1
10 TABLET, FILM COATED, EXTENDED RELEASE ORAL
Qty: 90 TABLET | Refills: 1 | Status: SHIPPED | OUTPATIENT
Start: 2024-08-27

## 2024-08-27 RX ORDER — FINASTERIDE 5 MG/1
5 TABLET, FILM COATED ORAL DAILY
Qty: 90 TABLET | Refills: 1 | Status: SHIPPED | OUTPATIENT
Start: 2024-08-27 | End: 2025-08-27

## 2024-08-27 RX ORDER — FLUTICASONE PROPIONATE AND SALMETEROL 250; 50 UG/1; UG/1
1 POWDER RESPIRATORY (INHALATION) 2 TIMES DAILY
Qty: 180 EACH | Refills: 1 | Status: SHIPPED | OUTPATIENT
Start: 2024-08-27 | End: 2025-08-27

## 2024-08-27 RX ORDER — INSULIN DEGLUDEC 200 U/ML
64 INJECTION, SOLUTION SUBCUTANEOUS DAILY
Qty: 3 PEN | Refills: 1 | Status: SHIPPED | OUTPATIENT
Start: 2024-08-27

## 2024-08-27 RX ORDER — FOLIC ACID 1 MG/1
1 TABLET ORAL DAILY
Qty: 90 TABLET | Refills: 1 | Status: SHIPPED | OUTPATIENT
Start: 2024-08-27

## 2024-08-27 RX ORDER — DULAGLUTIDE 3 MG/.5ML
3 INJECTION, SOLUTION SUBCUTANEOUS
Qty: 4 PEN | Refills: 1 | Status: SHIPPED | OUTPATIENT
Start: 2024-08-27 | End: 2025-08-27

## 2024-08-27 RX ORDER — FAMOTIDINE 40 MG/1
20 TABLET, FILM COATED ORAL 2 TIMES DAILY
Qty: 180 TABLET | Refills: 1 | Status: SHIPPED | OUTPATIENT
Start: 2024-08-27

## 2024-08-27 RX ORDER — NAPROXEN SODIUM 220 MG/1
81 TABLET, FILM COATED ORAL DAILY
Qty: 90 TABLET | Refills: 3 | Status: SHIPPED | OUTPATIENT
Start: 2024-08-27 | End: 2025-08-27

## 2024-08-27 RX ORDER — TAMSULOSIN HYDROCHLORIDE 0.4 MG/1
0.8 CAPSULE ORAL DAILY
Qty: 120 CAPSULE | Refills: 1 | Status: SHIPPED | OUTPATIENT
Start: 2024-08-27 | End: 2025-08-27

## 2024-08-27 RX ORDER — CROMOLYN SODIUM 40 MG/ML
1 SOLUTION/ DROPS OPHTHALMIC 4 TIMES DAILY
Qty: 10 ML | Refills: 0 | Status: SHIPPED | OUTPATIENT
Start: 2024-08-27 | End: 2024-09-26

## 2024-08-27 RX ORDER — ALBUTEROL SULFATE 90 UG/1
2 INHALANT RESPIRATORY (INHALATION)
Qty: 18 G | Refills: 5 | Status: SHIPPED | OUTPATIENT
Start: 2024-08-27 | End: 2024-08-27 | Stop reason: SDUPTHER

## 2024-08-27 RX ORDER — ATORVASTATIN CALCIUM 80 MG/1
80 TABLET, FILM COATED ORAL DAILY
Qty: 90 TABLET | Refills: 1 | Status: SHIPPED | OUTPATIENT
Start: 2024-08-27 | End: 2025-08-22

## 2024-08-27 RX ORDER — METFORMIN HYDROCHLORIDE 1000 MG/1
1000 TABLET ORAL 2 TIMES DAILY WITH MEALS
Qty: 180 TABLET | Refills: 1 | Status: SHIPPED | OUTPATIENT
Start: 2024-08-27 | End: 2025-08-27

## 2024-08-27 RX ORDER — FERROUS SULFATE 324(65)MG
324 TABLET, DELAYED RELEASE (ENTERIC COATED) ORAL DAILY
Qty: 90 TABLET | Refills: 3 | Status: SHIPPED | OUTPATIENT
Start: 2024-08-27

## 2024-08-27 NOTE — PROGRESS NOTES
John Addison is a 69 y.o. male here for a diabetic eye screening with non-dilated fundus photos per KYREE Mcknight.    Patient cooperative?: Yes  Small pupils?: No  Last eye exam: Unknown    For exam results, see Encounter Report.

## 2024-08-27 NOTE — ASSESSMENT & PLAN NOTE
BP Readings from Last 3 Encounters:   08/27/24 108/74   03/12/24 96/64   03/12/24 (!) 90/59     Follow low sodium diet, < 2 gm/day (avoid high salty foods such as processed meats/ sausage/hurtado/ sandwich meat, chips, pickles, cheese, crackers and soft drinks/ electrolyte replacement drinks).  Avoid tobacco/ alcohol use  Educated on health benefits of at least 5 days/ week of 30 minutes moderate intensity exercise (brisk walking) and 2 or more days/ week of muscle strength activities  Daily ASA 81 mg for CV prevention  Continue current medication regimen

## 2024-08-27 NOTE — ASSESSMENT & PLAN NOTE
Completed CT  and MRI without evidence of encephalocele  Last seen by [facility]NT 8/15/2019 with recommendation for referral to audio for HA, repeat audio in 1 year, f/u in clinic June 2020

## 2024-08-27 NOTE — ASSESSMENT & PLAN NOTE
Previously sent prescription for topical therapy prescription. Cannot confirm if pt completed treatment as directed.   Referral to wound care for diabetic foot care please.

## 2024-08-27 NOTE — ASSESSMENT & PLAN NOTE
Repeat ABIs ordered at previous visits and have not been completed.    Abnormal SANIA 10/2022  Previously referred to Vascular and it does not seem that pt has attended appt   Re-order SANIA and referral to Vascular

## 2024-08-27 NOTE — ASSESSMENT & PLAN NOTE
Complains of chronic drainage to eyes.   Rx as directed. Referral to [facility] eye clinic

## 2024-08-27 NOTE — ASSESSMENT & PLAN NOTE
Lab Results   Component Value Date    HGBA1C 13.9 (H) 03/12/2024     Pt due for updated labs. Out of Trulicity.. unsure how long. Questionable compliance with medications after reivew of refills as pt is overdue for labs and f/u visit. He does not check CBGs. Does not have HH. Daughter administers insulin. Non compliant with ADA diet.   Fundus photos today, referral to eye clinic also ordered  DM foot exam today , referral to vascular/ wound care   F/u with labs.

## 2024-08-27 NOTE — ASSESSMENT & PLAN NOTE
Lab Results   Component Value Date    CHOL 110 08/28/2023     Lab Results   Component Value Date    HDL 34 (L) 08/28/2023     Lab Results   Component Value Date    TRIG 70 08/28/2023     Lab Results   Component Value Date    LDL 62.00 08/28/2023     Avoid tobacco/ alcohol  Follow low fat/low cholesterol diet such as avoid/ decrease hurtado, sausage, fried foods, cookies, cakes, chips, cheese, whole milk, butter, mayonnaise. Add olive oil, avocados, lean meats, fresh fruits/ vegetables, heart healthy nuts to diet.  Educated on health benefits of exercise 5 days/ week of at least 30 minutes moderate intensity exercise (brisk walking) and 2 days/ week of muscle strength activities  Daily ASA 81 mg for CV prevention  Continue current medication regimen  Labs due with f/u

## 2024-08-29 ENCOUNTER — TELEPHONE (OUTPATIENT)
Dept: INTERNAL MEDICINE | Facility: CLINIC | Age: 69
End: 2024-08-29
Payer: MEDICARE

## 2024-08-29 NOTE — TELEPHONE ENCOUNTER
Please inform pt of below.     See message from call center.    Dear (Doctor's Office)     I am writing to inform you that our team has made three unsuccessful attempts to contact your patient regarding their (CV US DOPPLER ARTERIAL LEGS BILATERAL) appointment.  We have not been able to reach(John Addison) to schedule their appointment.  As a result, we have deferred their orders for a year.     We kindly request that you inform (John Addison) to contact us directly at [phone] option 1 at their earliest convenience to schedule their appointment.  We appreciate your understanding and assistance in this matter.  Please don't hesitate to reach out if you have any questions or need further information.     Thank you for your cooperatiion.         Best Regards,     [facility]

## 2024-08-30 NOTE — TELEPHONE ENCOUNTER
Called no answer left message on voicemail to contact scheduling department  [phone] option 1.

## 2024-09-10 ENCOUNTER — OFFICE VISIT (OUTPATIENT)
Dept: CARDIOLOGY | Facility: CLINIC | Age: 69
End: 2024-09-10
Payer: MEDICARE

## 2024-09-10 VITALS
DIASTOLIC BLOOD PRESSURE: 78 MMHG | RESPIRATION RATE: 18 BRPM | HEART RATE: 100 BPM | SYSTOLIC BLOOD PRESSURE: 121 MMHG | HEIGHT: 70 IN | WEIGHT: 247.13 LBS | BODY MASS INDEX: 35.38 KG/M2 | TEMPERATURE: 98 F | OXYGEN SATURATION: 100 %

## 2024-09-10 DIAGNOSIS — Z79.4 TYPE 2 DIABETES MELLITUS WITH HYPERGLYCEMIA, WITH LONG-TERM CURRENT USE OF INSULIN: ICD-10-CM

## 2024-09-10 DIAGNOSIS — F17.219 CIGARETTE NICOTINE DEPENDENCE WITH NICOTINE-INDUCED DISORDER: ICD-10-CM

## 2024-09-10 DIAGNOSIS — I25.10 CORONARY ARTERY DISEASE, UNSPECIFIED VESSEL OR LESION TYPE, UNSPECIFIED WHETHER ANGINA PRESENT, UNSPECIFIED WHETHER NATIVE OR TRANSPLANTED HEART: Primary | ICD-10-CM

## 2024-09-10 DIAGNOSIS — I73.9 PERIPHERAL ARTERY DISEASE: ICD-10-CM

## 2024-09-10 DIAGNOSIS — E78.5 HYPERLIPIDEMIA LDL GOAL <70: ICD-10-CM

## 2024-09-10 DIAGNOSIS — E66.01 SEVERE OBESITY (BMI 35.0-39.9) WITH COMORBIDITY: ICD-10-CM

## 2024-09-10 DIAGNOSIS — E11.65 TYPE 2 DIABETES MELLITUS WITH HYPERGLYCEMIA, WITH LONG-TERM CURRENT USE OF INSULIN: ICD-10-CM

## 2024-09-10 DIAGNOSIS — I10 PRIMARY HYPERTENSION: ICD-10-CM

## 2024-09-10 PROCEDURE — 99214 OFFICE O/P EST MOD 30 MIN: CPT | Mod: S$PBB,,,

## 2024-09-10 PROCEDURE — 93005 ELECTROCARDIOGRAM TRACING: CPT

## 2024-09-10 PROCEDURE — 99215 OFFICE O/P EST HI 40 MIN: CPT | Mod: PBBFAC,25

## 2024-09-10 NOTE — PROGRESS NOTES
CHIEF COMPLAINT:   No chief complaint on file.                                                 HPI:  John Addison 69 y.o. male with a PMH significant for CAD/NSTEMI 2/2016 s/p PCI of left circumflex, inferior wall STEMI s/p PTCA/BHAKTI of distal RCA in 9/2017, hypertension, hypercholesterolemia, diabetes, and chronic tobacco who presents to cardiology clinic for follow up and ongoing care.  At last office visit patient continued to endorse claudication symptoms in bilateral lower extremities.  He was encouraged to follow up with vascular surgery, however, patient has not followed up yet.  He has a appointment scheduled for October 1, 2024.  He also endorsed some stable SOB/BRYAN, otherwise he denied any further cardiac complaints.    Today the patient states that he feels stable overall.  He continues to endorse claudication symptoms in bilateral lower extremities, as stated above, he will be following up with them in the next month.  His claudication symptoms are, burning, numbness, tingling, heaviness with walking.  He states that his symptoms are improved with resting and sitting.  He continues to have stable SOB/BRYAN.  He denies any further complaints such as palpitations, PND, orthopnea, lightheadedness, dizziness, syncope, lower extremity edema.  He can complete his ADLs without any issues or ischemic symptoms, however he does notice SOB/BRYAN with some activity.  He states that this is stable and has not progressed over the last few years.  He reports compliance with all his current medications and is tolerating them well.  He smokes approximately 1/2 pack of cigarettes per day and is not interested in quitting at this time.                                                                                                                                                                                                                                                                                                                                                                                                                                                                           CARDIAC TESTING:  Lexiscan Stress Test October 2018:  ECG portion of stress test is negative for ischemia by diagnostic criteria.  Negative for ischemia. Normal myocardial perfusion study on stress imaging. Normal LVEF 57%.  Recommendation:  Recommend medical management of coronary disease if clinically indicated.    SANIA testing November 2018:  Normal resting SANIA  Normal stress Doppler-study limited by patient's complaint of shortness of breath    Echocardiogram September 20, 2017:  Ejection fraction is visually estimated at 55%  Mild mitral regurgitation  No evidence of pericardial effusion          Patient Active Problem List   Diagnosis    Coronary artery disease    Hypertension    Hyperlipidemia LDL goal <70    Pain in both lower extremities    Abdominal swelling    Abnormal findings on diagnostic imaging of lung    Abnormal liver function tests    Anemia    Chronic allergic conjunctivitis    Chronic obstructive pulmonary disease    Current drinker of alcohol    Intra-abdominal lymphadenopathy    Tegmen defect of base of skull    Cigarette nicotine dependence with nicotine-induced disorder    Atypical chest pain    Xerosis of skin    Dystrophic nail    Onychomycosis of multiple toenails with type 2 diabetes mellitus    Avulsion of toenail of left foot    Diabetes mellitus    Severe obesity (BMI 35.0-39.9) with comorbidity    Peripheral artery disease    Hyperglycemia due to diabetes mellitus    Type 2 diabetes mellitus with hyperglycemia, with long-term current use of insulin    Hearing loss    BPH (benign prostatic hyperplasia)     Past Surgical History:   Procedure Laterality Date    CORONARY ANGIOPLASTY      EXCISION, LESION, LOWER EXTREMITY Left 5/25/2023    Procedure: EXCISION, LESION, LOWER EXTREMITY;  Surgeon: South Terry MD;  Location:  [facility] OR;  Service: General;  Laterality: Left;    TONSILLECTOMY  07/2018    WOUND DEBRIDEMENT N/A 5/10/2023    Procedure: DEBRIDEMENT, WOUND;  Surgeon: Smith Lay MD;  Location: [facility] OR;  Service: General;  Laterality: N/A;  perineum debridement, lithotomy     Social History     Socioeconomic History    Marital status: Single   Tobacco Use    Smoking status: Every Day     Current packs/day: 0.50     Average packs/day: 0.5 packs/day for 60.3 years (30.2 ttl pk-yrs)     Types: Cigarettes     Start date: 5/8/1964    Smokeless tobacco: Never   Substance and Sexual Activity    Alcohol use: Yes     Comment: beer 2x/month    Drug use: Yes     Types: Marijuana    Sexual activity: Not Currently     Social Determinants of Health     Financial Resource Strain: Low Risk  (5/19/2023)    Overall Financial Resource Strain (CARDIA)     Difficulty of Paying Living Expenses: Not hard at all   Food Insecurity: No Food Insecurity (5/19/2023)    Hunger Vital Sign     Worried About Running Out of Food in the Last Year: Never true     Ran Out of Food in the Last Year: Never true   Transportation Needs: No Transportation Needs (5/19/2023)    PRAPARE - Transportation     Lack of Transportation (Medical): No     Lack of Transportation (Non-Medical): No   Physical Activity: Inactive (5/19/2023)    Exercise Vital Sign     Days of Exercise per Week: 0 days     Minutes of Exercise per Session: 0 min   Stress: No Stress Concern Present (5/19/2023)    [employer] - Occupational Stress Questionnaire     Feeling of Stress : Not at all   Housing Stability: Low Risk  (5/19/2023)    Housing Stability Vital Sign     Unable to Pay for Housing in the Last Year: No     Number of Places Lived in the Last Year: 1     Unstable Housing in the Last Year: No        Family History   Problem Relation Name Age of Onset    Hypertension Mother      Heart failure Mother      Heart attack Mother      Coronary artery disease Mother       Cancer Father      Cancer Sister       Review of patient's allergies indicates:  No Known Allergies      ROS:  Review of Systems   Constitutional:  Negative for malaise/fatigue.   HENT: Negative.     Eyes: Negative.    Respiratory:  Positive for shortness of breath.    Cardiovascular:  Positive for claudication. Negative for chest pain, palpitations, orthopnea, leg swelling and PND.   Gastrointestinal: Negative.    Genitourinary: Negative.    Musculoskeletal: Negative.    Skin: Negative.    Neurological: Negative.  Negative for dizziness and weakness.   Endo/Heme/Allergies: Negative.    Psychiatric/Behavioral: Negative.                                                                                                                                                                                  Negative except as stated in the history of present illness. See HPI for details.    PHYSICAL EXAM:  There were no vitals taken for this visit.      Physical Exam  Constitutional:       Appearance: Normal appearance. He is obese. He is not ill-appearing.   HENT:      Head: Normocephalic.      Mouth/Throat:      Mouth: Mucous membranes are moist.   Eyes:      Extraocular Movements: Extraocular movements intact.   Neck:      Vascular: No carotid bruit.   Cardiovascular:      Rate and Rhythm: Normal rate and regular rhythm.      Pulses: Normal pulses.      Heart sounds: Normal heart sounds.   Pulmonary:      Effort: Pulmonary effort is normal.   Abdominal:      General: There is no distension.   Musculoskeletal:         General: Normal range of motion.      Right lower leg: No edema.      Left lower leg: No edema.   Skin:     General: Skin is warm and dry.   Neurological:      General: No focal deficit present.      Mental Status: He is alert and oriented to person, place, and time.   Psychiatric:         Mood and Affect: Mood normal.         Behavior: Behavior normal.         Current Outpatient Medications   Medication  "Instructions    albuterol (PROVENTIL/VENTOLIN HFA) 90 mcg/actuation inhaler 2 puffs, Inhalation, Every 6 hours PRN    aspirin 81 mg, Oral, Daily    atorvastatin (LIPITOR) 80 mg, Oral, Daily    BD ULTRA-FINE GOOD PEN NEEDLE 32 gauge x 5/32" Ndle 1 Syringe, Subcutaneous    clopidogreL (PLAVIX) 75 mg, Oral, Daily    cromolyn (OPTICROM) 4 % ophthalmic solution 1 drop, Ophthalmic, 4 times daily    docusate sodium (COLACE) 100 mg, Oral, As needed (PRN)    famotidine (PEPCID) 20 mg, Oral, 2 times daily    ferrous sulfate 324 mg, Oral, Daily    finasteride (PROSCAR) 5 mg, Oral, Daily    fluticasone-salmeterol diskus inhaler 250-50 mcg 1 puff, Inhalation, 2 times daily, Controller    folic acid (FOLVITE) 1 mg, Oral, Daily    gabapentin (NEURONTIN) 300 mg, Oral, 3 times daily PRN    glipiZIDE (GLUCOTROL) 10 mg, Oral, With breakfast    insulin degludec (TRESIBA FLEXTOUCH U-200) 64 Units, Subcutaneous, Daily    lisinopriL 10 mg, Oral, Daily    metFORMIN (GLUCOPHAGE) 1,000 mg, Oral, 2 times daily with meals    metoprolol tartrate (LOPRESSOR) 50 mg, Oral, 2 times daily    sodium bicarbonate 650 mg, Oral, 2 times daily    tamsulosin (FLOMAX) 0.8 mg, Oral, Daily    TRULICITY 3 mg, Subcutaneous, Every 7 days        All medications, laboratory studies, cardiac diagnostic imaging reviewed.     Lab Results   Component Value Date    LDL 62.00 08/28/2023    TRIG 70 08/28/2023    CREATININE 1.52 (H) 03/12/2024    MG 1.80 07/03/2023    K 5.0 03/12/2024        ASSESSMENT/PLAN:    CAD (coronary artery disease)  CAD symptoms have not progressed  Endorses ongoing stable SOB/BRYAN that is his baseline, no worsening  Denies CP, heaviness, tightness, dizziness, or syncope  NSTEMI s/p PCI left circumflex in 2/2016  STEMI s/p PTCA/BHAKTI distal RCA on 9/2017   Lexiscan Stress Test October 2018 - negative for ischemia  Continue baby aspirin, plavix, lisinopril, metoprolol, and atorvastatin   Counseled on lifestyle modifications with diet, exercise, and " "smoking cessation  EKG today with NSR,  BPM     Left leg pain  Claudication  Ongoing claudication symptoms in bilateral lower extremities   Reports claudication with ambulation. Describes it as a burning, heaviness pain, numbness and tingling   Reports intermittent pain with ambulation. Also has sharp shooting pain at rest that appears to be related to neuropathy  Arterial Insufficiency US on 10.10.22 reveled moderate arterial flow reduction in right lower extremity and moderately severe arterial flow reduction in the left lower extremity.  Repeat arterial ultrasounds were ordered at last office visit, however patient did not complete   Patient was also referred to vascular surgery per PCP in September of 2023, however patient did not go to visit   Strongly encouraged patient to complete the arterial insufficiency ultrasounds and to reach out to vascular surgery to make appointment- now scheduled for 10.1.24    HTN   Controlled - /78 today   Continue current medications as listed above   Counseled on a low sodium, low cholesterol diet    HLD   LDL at goal - 62 per labs August 2023  Continue Atorvastatin 80mg by mouth daily  FLP/CMP ordered for patient to complete prior to next visit with his PCP   Counseled patient on low-cholesterol, low-fat diet, and encourage exercise as tolerated.    Diabetes  Uncontrolled - last A1c 13.9  Management per PCP    Tobacco Abuse  Continues to smoke approximately half a pack to one pack a day.  He states that 1 pack typically lasts him about a day and a half- not interested in quitting- states that "smoking is something that's going to go with me"  Counseled on smoking cessation       Complete lab work ordered per PCP   EKG today   Follow up in cardiology clinic in 6 months or sooner if needed  Follow up with PCP/other specialties as directed   Please notify clinic if any new concerns or any change in symptoms        "

## 2024-09-10 NOTE — PATIENT INSTRUCTIONS
Complete lab work ordered per PCP   EKG today   Follow up in cardiology clinic in 6 months or sooner if needed  Follow up with PCP/other specialties as directed   Please notify clinic if any new concerns or any change in symptoms

## 2024-09-11 LAB
OHS QRS DURATION: 78 MS
OHS QTC CALCULATION: 446 MS

## 2024-09-23 ENCOUNTER — HOSPITAL ENCOUNTER (OUTPATIENT)
Dept: RADIOLOGY | Facility: HOSPITAL | Age: 69
Discharge: HOME | End: 2024-09-23
Attending: NURSE PRACTITIONER
Payer: MEDICARE

## 2024-09-23 ENCOUNTER — TELEPHONE (OUTPATIENT)
Dept: INTERNAL MEDICINE | Facility: CLINIC | Age: 69
End: 2024-09-23
Payer: MEDICARE

## 2024-09-23 DIAGNOSIS — Z87.891 PERSONAL HISTORY OF SMOKING: ICD-10-CM

## 2024-09-23 PROCEDURE — 71271 CT THORAX LUNG CANCER SCR C-: CPT | Mod: TC

## 2024-09-23 NOTE — TELEPHONE ENCOUNTER
Please let pt know several attempts made by [facility] that have been unsuccessful. Please encourage him to reach out to schedule an appointment.     Thank you

## 2024-09-25 DIAGNOSIS — I73.9 PERIPHERAL ARTERY DISEASE: Primary | ICD-10-CM

## 2024-10-01 ENCOUNTER — TELEPHONE (OUTPATIENT)
Dept: INTERNAL MEDICINE | Facility: CLINIC | Age: 69
End: 2024-10-01
Payer: MEDICARE

## 2024-10-01 NOTE — TELEPHONE ENCOUNTER
----- Message from Cammy Christianson NP sent at 10/1/2024  8:41 AM CDT -----  Please let pt know ct lung results showed stable findings of lung nodules.

## 2024-10-01 NOTE — TELEPHONE ENCOUNTER
Called no answer. Left message on voicemail to contact the clinic [phone] concerning results.

## 2024-10-02 NOTE — TELEPHONE ENCOUNTER
Second attempt made to inform of results. No answer, message left on voicemail to contact the clinic.

## 2024-10-03 ENCOUNTER — TELEPHONE (OUTPATIENT)
Dept: INTERNAL MEDICINE | Facility: CLINIC | Age: 69
End: 2024-10-03
Payer: MEDICARE

## 2024-10-04 NOTE — TELEPHONE ENCOUNTER
Called no answer. Left message on voicemail to contact the clinic [phone] concerning results.

## 2024-10-10 ENCOUNTER — PATIENT OUTREACH (OUTPATIENT)
Facility: CLINIC | Age: 69
End: 2024-10-10
Payer: MEDICARE

## 2024-11-18 DIAGNOSIS — Z79.4 TYPE 2 DIABETES MELLITUS WITH HYPERGLYCEMIA, WITH LONG-TERM CURRENT USE OF INSULIN: ICD-10-CM

## 2024-11-18 DIAGNOSIS — E11.40 TYPE 2 DIABETES MELLITUS WITH DIABETIC NEUROPATHY, WITHOUT LONG-TERM CURRENT USE OF INSULIN: ICD-10-CM

## 2024-11-18 DIAGNOSIS — E11.65 TYPE 2 DIABETES MELLITUS WITH HYPERGLYCEMIA, WITH LONG-TERM CURRENT USE OF INSULIN: ICD-10-CM

## 2024-11-18 RX ORDER — INSULIN DEGLUDEC 200 U/ML
INJECTION, SOLUTION SUBCUTANEOUS
Qty: 9 ML | Refills: 1 | Status: SHIPPED | OUTPATIENT
Start: 2024-11-18

## 2024-11-18 NOTE — TELEPHONE ENCOUNTER
Pharmacy requesting refill for pt's TRESIBA FLEXTOUCH U-200 200 unit/mL (3 mL) insulin pen     LOV: 8/27/24    NOV: 12/4/24

## 2024-12-02 ENCOUNTER — LAB VISIT (OUTPATIENT)
Dept: LAB | Facility: HOSPITAL | Age: 69
End: 2024-12-02
Attending: NURSE PRACTITIONER
Payer: MEDICARE

## 2024-12-02 DIAGNOSIS — I25.10 CORONARY ARTERY DISEASE, UNSPECIFIED VESSEL OR LESION TYPE, UNSPECIFIED WHETHER ANGINA PRESENT, UNSPECIFIED WHETHER NATIVE OR TRANSPLANTED HEART: ICD-10-CM

## 2024-12-02 DIAGNOSIS — I10 PRIMARY HYPERTENSION: ICD-10-CM

## 2024-12-02 DIAGNOSIS — E78.5 HYPERLIPIDEMIA LDL GOAL <70: ICD-10-CM

## 2024-12-02 DIAGNOSIS — Z79.4 TYPE 2 DIABETES MELLITUS WITH HYPERGLYCEMIA, WITH LONG-TERM CURRENT USE OF INSULIN: ICD-10-CM

## 2024-12-02 DIAGNOSIS — I73.9 PERIPHERAL ARTERY DISEASE: ICD-10-CM

## 2024-12-02 DIAGNOSIS — E11.65 TYPE 2 DIABETES MELLITUS WITH HYPERGLYCEMIA, WITH LONG-TERM CURRENT USE OF INSULIN: ICD-10-CM

## 2024-12-02 DIAGNOSIS — Z12.5 PROSTATE CANCER SCREENING: ICD-10-CM

## 2024-12-02 DIAGNOSIS — E11.40 TYPE 2 DIABETES MELLITUS WITH DIABETIC NEUROPATHY, WITHOUT LONG-TERM CURRENT USE OF INSULIN: ICD-10-CM

## 2024-12-02 LAB
ALBUMIN SERPL-MCNC: 3.4 G/DL (ref 3.4–4.8)
ALBUMIN/GLOB SERPL: 0.7 RATIO (ref 1.1–2)
ALP SERPL-CCNC: 121 UNIT/L (ref 40–150)
ALT SERPL-CCNC: 25 UNIT/L (ref 0–55)
ANION GAP SERPL CALC-SCNC: 6 MEQ/L
AST SERPL-CCNC: 23 UNIT/L (ref 5–34)
BASOPHILS # BLD AUTO: 0.09 X10(3)/MCL
BASOPHILS NFR BLD AUTO: 0.9 %
BILIRUB SERPL-MCNC: 0.3 MG/DL
BUN SERPL-MCNC: 25.4 MG/DL (ref 8.4–25.7)
CALCIUM SERPL-MCNC: 9.8 MG/DL (ref 8.8–10)
CHLORIDE SERPL-SCNC: 102 MMOL/L (ref 98–107)
CHOLEST SERPL-MCNC: 135 MG/DL
CHOLEST/HDLC SERPL: 4 {RATIO} (ref 0–5)
CO2 SERPL-SCNC: 28 MMOL/L (ref 23–31)
CREAT SERPL-MCNC: 1.37 MG/DL (ref 0.72–1.25)
CREAT UR-MCNC: 88.7 MG/DL (ref 63–166)
CREAT/UREA NIT SERPL: 19
EOSINOPHIL # BLD AUTO: 0.23 X10(3)/MCL (ref 0–0.9)
EOSINOPHIL NFR BLD AUTO: 2.2 %
ERYTHROCYTE [DISTWIDTH] IN BLOOD BY AUTOMATED COUNT: 19.4 % (ref 11.5–17)
EST. AVERAGE GLUCOSE BLD GHB EST-MCNC: 286.2 MG/DL
GFR SERPLBLD CREATININE-BSD FMLA CKD-EPI: 55 ML/MIN/1.73/M2
GLOBULIN SER-MCNC: 4.6 GM/DL (ref 2.4–3.5)
GLUCOSE SERPL-MCNC: 343 MG/DL (ref 82–115)
HBA1C MFR BLD: 11.6 %
HCT VFR BLD AUTO: 46.1 % (ref 42–52)
HDLC SERPL-MCNC: 35 MG/DL (ref 35–60)
HGB BLD-MCNC: 15.5 G/DL (ref 14–18)
IMM GRANULOCYTES # BLD AUTO: 0.03 X10(3)/MCL (ref 0–0.04)
IMM GRANULOCYTES NFR BLD AUTO: 0.3 %
LDLC SERPL CALC-MCNC: 51 MG/DL (ref 50–140)
LYMPHOCYTES # BLD AUTO: 3.3 X10(3)/MCL (ref 0.6–4.6)
LYMPHOCYTES NFR BLD AUTO: 31.9 %
MCH RBC QN AUTO: 25.8 PG (ref 27–31)
MCHC RBC AUTO-ENTMCNC: 33.6 G/DL (ref 33–36)
MCV RBC AUTO: 76.7 FL (ref 80–94)
MICROALBUMIN UR-MCNC: 477.6 UG/ML
MICROALBUMIN/CREAT RATIO PNL UR: 538.4 MG/GM CR (ref 0–30)
MONOCYTES # BLD AUTO: 0.8 X10(3)/MCL (ref 0.1–1.3)
MONOCYTES NFR BLD AUTO: 7.7 %
NEUTROPHILS # BLD AUTO: 5.91 X10(3)/MCL (ref 2.1–9.2)
NEUTROPHILS NFR BLD AUTO: 57 %
NRBC BLD AUTO-RTO: 0 %
PLATELET # BLD AUTO: 261 X10(3)/MCL (ref 130–400)
PMV BLD AUTO: 10.1 FL (ref 7.4–10.4)
POTASSIUM SERPL-SCNC: 4.9 MMOL/L (ref 3.5–5.1)
PROT SERPL-MCNC: 8 GM/DL (ref 5.8–7.6)
PSA SERPL-MCNC: 1.44 NG/ML
RBC # BLD AUTO: 6.01 X10(6)/MCL (ref 4.7–6.1)
SODIUM SERPL-SCNC: 136 MMOL/L (ref 136–145)
TRIGL SERPL-MCNC: 246 MG/DL (ref 34–140)
TSH SERPL-ACNC: 1.88 UIU/ML (ref 0.35–4.94)
VLDLC SERPL CALC-MCNC: 49 MG/DL
WBC # BLD AUTO: 10.36 X10(3)/MCL (ref 4.5–11.5)

## 2024-12-02 PROCEDURE — 84153 ASSAY OF PSA TOTAL: CPT

## 2024-12-02 PROCEDURE — 84443 ASSAY THYROID STIM HORMONE: CPT

## 2024-12-02 PROCEDURE — 36415 COLL VENOUS BLD VENIPUNCTURE: CPT

## 2024-12-02 PROCEDURE — 85025 COMPLETE CBC W/AUTO DIFF WBC: CPT

## 2024-12-02 PROCEDURE — 82570 ASSAY OF URINE CREATININE: CPT

## 2024-12-02 PROCEDURE — 83036 HEMOGLOBIN GLYCOSYLATED A1C: CPT

## 2024-12-02 PROCEDURE — 80053 COMPREHEN METABOLIC PANEL: CPT

## 2024-12-02 PROCEDURE — 80061 LIPID PANEL: CPT

## 2024-12-02 NOTE — PROGRESS NOTES
Please call patient and let him know blood sugar was 343 on blood work. Recommend he take medications if he has not already done so and encourage compliance with ADA diet. If any associated symptoms of n/v, abdominal pain, palpitations, confusion/ disorientation, weakness, fatigue- report to ER please.

## 2024-12-03 ENCOUNTER — TELEPHONE (OUTPATIENT)
Dept: INTERNAL MEDICINE | Facility: CLINIC | Age: 69
End: 2024-12-03
Payer: MEDICARE

## 2024-12-03 NOTE — TELEPHONE ENCOUNTER
Contacted Chen ( daughter) informed of PCP message . She voiced  understanding. She stated he has appointment tomorrow. He's asymptomatic.

## 2024-12-03 NOTE — TELEPHONE ENCOUNTER
----- Message from Cammy Christianson NP sent at 12/2/2024 12:43 PM CST -----  Please call patient and let him know blood sugar was 343 on blood work. Recommend he take medications if he has not already done so and encourage compliance with ADA diet. If any associated symptoms of n/v, abdominal pain, palpitations, confusion/ disorientation, weakness, fatigue- report to ER please.

## 2024-12-04 ENCOUNTER — OFFICE VISIT (OUTPATIENT)
Dept: INTERNAL MEDICINE | Facility: CLINIC | Age: 69
End: 2024-12-04
Payer: MEDICARE

## 2024-12-04 VITALS
SYSTOLIC BLOOD PRESSURE: 121 MMHG | HEART RATE: 98 BPM | BODY MASS INDEX: 34.76 KG/M2 | OXYGEN SATURATION: 96 % | HEIGHT: 70 IN | WEIGHT: 242.81 LBS | DIASTOLIC BLOOD PRESSURE: 79 MMHG | TEMPERATURE: 98 F | RESPIRATION RATE: 18 BRPM

## 2024-12-04 DIAGNOSIS — Z55.0 ILLITERACY AND LOW-LEVEL LITERACY: ICD-10-CM

## 2024-12-04 DIAGNOSIS — N18.31 CHRONIC KIDNEY DISEASE, STAGE 3A: ICD-10-CM

## 2024-12-04 DIAGNOSIS — Z74.8 DEPENDENT FOR TRANSPORTATION: ICD-10-CM

## 2024-12-04 DIAGNOSIS — E78.5 HYPERLIPIDEMIA LDL GOAL <70: ICD-10-CM

## 2024-12-04 DIAGNOSIS — E11.65 TYPE 2 DIABETES MELLITUS WITH HYPERGLYCEMIA, WITH LONG-TERM CURRENT USE OF INSULIN: ICD-10-CM

## 2024-12-04 DIAGNOSIS — Z23 NEEDS FLU SHOT: ICD-10-CM

## 2024-12-04 DIAGNOSIS — Z91.199 MEDICAL NON-COMPLIANCE: ICD-10-CM

## 2024-12-04 DIAGNOSIS — F17.219 CIGARETTE NICOTINE DEPENDENCE WITH NICOTINE-INDUCED DISORDER: ICD-10-CM

## 2024-12-04 DIAGNOSIS — Z79.4 TYPE 2 DIABETES MELLITUS WITHOUT COMPLICATION, WITH LONG-TERM CURRENT USE OF INSULIN: Primary | ICD-10-CM

## 2024-12-04 DIAGNOSIS — R91.8 PULMONARY NODULES: ICD-10-CM

## 2024-12-04 DIAGNOSIS — E11.9 TYPE 2 DIABETES MELLITUS WITHOUT COMPLICATION, WITH LONG-TERM CURRENT USE OF INSULIN: Primary | ICD-10-CM

## 2024-12-04 DIAGNOSIS — J44.9 CHRONIC OBSTRUCTIVE PULMONARY DISEASE, UNSPECIFIED COPD TYPE: ICD-10-CM

## 2024-12-04 DIAGNOSIS — N40.0 BENIGN PROSTATIC HYPERPLASIA, UNSPECIFIED WHETHER LOWER URINARY TRACT SYMPTOMS PRESENT: ICD-10-CM

## 2024-12-04 DIAGNOSIS — I73.9 PERIPHERAL ARTERY DISEASE: ICD-10-CM

## 2024-12-04 DIAGNOSIS — I10 PRIMARY HYPERTENSION: ICD-10-CM

## 2024-12-04 DIAGNOSIS — I25.10 CORONARY ARTERY DISEASE, UNSPECIFIED VESSEL OR LESION TYPE, UNSPECIFIED WHETHER ANGINA PRESENT, UNSPECIFIED WHETHER NATIVE OR TRANSPLANTED HEART: ICD-10-CM

## 2024-12-04 DIAGNOSIS — E11.40 TYPE 2 DIABETES MELLITUS WITH DIABETIC NEUROPATHY, WITHOUT LONG-TERM CURRENT USE OF INSULIN: ICD-10-CM

## 2024-12-04 DIAGNOSIS — Z79.4 TYPE 2 DIABETES MELLITUS WITH HYPERGLYCEMIA, WITH LONG-TERM CURRENT USE OF INSULIN: ICD-10-CM

## 2024-12-04 PROCEDURE — 90653 IIV ADJUVANT VACCINE IM: CPT | Mod: PBBFAC

## 2024-12-04 PROCEDURE — G0136 PR ADMINISTRATION, SOCIAL DETERMINANT ASSESSMNT, 5-15 MIN: HCPCS | Mod: PBBFAC | Performed by: NURSE PRACTITIONER

## 2024-12-04 PROCEDURE — G0008 ADMIN INFLUENZA VIRUS VAC: HCPCS | Mod: PBBFAC

## 2024-12-04 PROCEDURE — 99215 OFFICE O/P EST HI 40 MIN: CPT | Mod: PBBFAC | Performed by: NURSE PRACTITIONER

## 2024-12-04 RX ORDER — METFORMIN HYDROCHLORIDE 1000 MG/1
1000 TABLET ORAL 2 TIMES DAILY WITH MEALS
Qty: 180 TABLET | Refills: 1 | Status: SHIPPED | OUTPATIENT
Start: 2024-12-04 | End: 2025-12-04

## 2024-12-04 RX ORDER — FOLIC ACID 1 MG/1
1 TABLET ORAL DAILY
Qty: 90 TABLET | Refills: 1 | Status: SHIPPED | OUTPATIENT
Start: 2024-12-04

## 2024-12-04 RX ORDER — FLUTICASONE PROPIONATE AND SALMETEROL 250; 50 UG/1; UG/1
1 POWDER RESPIRATORY (INHALATION) 2 TIMES DAILY
Qty: 180 EACH | Refills: 1 | Status: SHIPPED | OUTPATIENT
Start: 2024-12-04 | End: 2025-12-04

## 2024-12-04 RX ORDER — ATORVASTATIN CALCIUM 80 MG/1
80 TABLET, FILM COATED ORAL DAILY
Qty: 90 TABLET | Refills: 1 | Status: SHIPPED | OUTPATIENT
Start: 2024-12-04 | End: 2025-11-29

## 2024-12-04 RX ORDER — FINASTERIDE 5 MG/1
5 TABLET, FILM COATED ORAL DAILY
Qty: 90 TABLET | Refills: 1 | Status: SHIPPED | OUTPATIENT
Start: 2024-12-04 | End: 2025-12-04

## 2024-12-04 RX ORDER — DULAGLUTIDE 4.5 MG/.5ML
4.5 INJECTION, SOLUTION SUBCUTANEOUS
Qty: 4 PEN | Refills: 11 | Status: SHIPPED | OUTPATIENT
Start: 2024-12-04 | End: 2025-12-04

## 2024-12-04 RX ORDER — FAMOTIDINE 40 MG/1
20 TABLET, FILM COATED ORAL 2 TIMES DAILY
Qty: 180 TABLET | Refills: 1 | Status: SHIPPED | OUTPATIENT
Start: 2024-12-04

## 2024-12-04 RX ORDER — TAMSULOSIN HYDROCHLORIDE 0.4 MG/1
0.8 CAPSULE ORAL DAILY
Qty: 180 CAPSULE | Refills: 1 | Status: SHIPPED | OUTPATIENT
Start: 2024-12-04 | End: 2025-12-04

## 2024-12-04 RX ORDER — ALBUTEROL SULFATE 90 UG/1
2 INHALANT RESPIRATORY (INHALATION) EVERY 6 HOURS PRN
Qty: 18 G | Refills: 5 | Status: SHIPPED | OUTPATIENT
Start: 2024-12-04

## 2024-12-04 RX ORDER — CLOPIDOGREL BISULFATE 75 MG/1
75 TABLET ORAL DAILY
Qty: 90 TABLET | Refills: 1 | Status: SHIPPED | OUTPATIENT
Start: 2024-12-04 | End: 2025-12-04

## 2024-12-04 RX ORDER — GLIPIZIDE 10 MG/1
10 TABLET, FILM COATED, EXTENDED RELEASE ORAL 2 TIMES DAILY
Qty: 180 TABLET | Refills: 1 | Status: SHIPPED | OUTPATIENT
Start: 2024-12-04

## 2024-12-04 RX ADMIN — INFLUENZA A VIRUS A/VICTORIA/4897/2022 IVR-238 (H1N1) ANTIGEN (FORMALDEHYDE INACTIVATED), INFLUENZA A VIRUS A/THAILAND/8/2022 IVR-237 (H3N2) ANTIGEN (FORMALDEHYDE INACTIVATED), INFLUENZA B VIRUS B/AUSTRIA/1359417/2021 BVR-26 ANTIGEN (FORMALDEHYDE INACTIVATED) 0.5 ML: 15; 15; 15 INJECTION, SUSPENSION INTRAMUSCULAR at 07:12

## 2024-12-04 SDOH — SOCIAL DETERMINANTS OF HEALTH (SDOH): ILITERACY AND LOW LEVEL LITERACY: Z55.0

## 2024-12-04 NOTE — PATIENT INSTRUCTIONS
Please call to reschedule missed appointments:   [facility] Vascular clinic [phone]  [facility] [phone]    You are being referred to [facility] Nephrology (kidney) clinic- [phone]    Call scheduling department to reschedule missed vascular ultrasound - [phone]    Diabetes medication changes:   Increase Glipizide to take one tablet BID   Increase Trulicity to 4.5 mg once weekly

## 2024-12-04 NOTE — ASSESSMENT & PLAN NOTE
Lab Results   Component Value Date    CHOL 135 12/02/2024     Lab Results   Component Value Date    HDL 35 12/02/2024     Lab Results   Component Value Date    TRIG 246 (H) 12/02/2024     Lab Results   Component Value Date    LDL 51.00 12/02/2024     Avoid tobacco/ alcohol  Follow low fat/low cholesterol diet such as avoid/ decrease hurtado, sausage, fried foods, cookies, cakes, chips, cheese, whole milk, butter, mayonnaise. Add olive oil, avocados, lean meats, fresh fruits/ vegetables, heart healthy nuts to diet.  Educated on health benefits of exercise 5 days/ week of at least 30 minutes moderate intensity exercise (brisk walking) and 2 days/ week of muscle strength activities  Daily ASA 81 mg for CV prevention  Continue current medication regimen

## 2024-12-04 NOTE — PROGRESS NOTES
Cammy L Sammy, NP   [facility]  [address][1] [id number]      PATIENT NAME: John Addison  : 1955  DATE: 24  MRN: [id number]        History of Present Illness / Problem Focused Workflow     John Addison presents to the clinic with Diabetes (Lab results)     68 yo male for follow up/ lab review. Last seen 24. PMH includes CAD/NSTEMI (2016) s/p PCI of left circumflex, inferior wall STEMI s/p PTCA/BHAKTI of distal RCA 2017, yasmin gangrene of scrotum, alcohol abuse. Active diagnosis include HTN, HLD, PAD, COPD, pulmonary nodule, type 2 DM, elevated LFTs, BPH, hearing loss, abd lymphadenopathy, onychomycosis, tobacco abuse, obesity. /79. A1c 11.6%. Microalbuminuria 538.4. LDL 51, trig 246. He states daughter sometimes is not able to make it to give insulin shots. He does not have any assistance at home. Does not check sugars. Will not administer insulin to self. Overall feels well and denies any fever, chills, CP, SOB, abd pain. Wants flu vaccine today; tolerated in the past.     He had vascular appointment 10/1/24 and 10/29/24-  missed.   He had wound care appointment 10/29/24- missed.   He was scheduled for vascular US 10/23/24- missed.    Other providers   [facility] Ophthalmology - referred 2024  [facility] Wound Care- missed appointments  [facility] Vascular- multiple missed appointments   [facility] Cardiology 9/10/24  [facility] GI   [facility] Pulmonology- last visit 21  [facility] ENT - last seen 2019  Dr. Romero/ Dr. Juarez Podiatry- unable to confirm pt received appts ?   [facility] Endo- former pt  Dr. Smith Lay- General Surgeon    Review of Systems     Review of Systems   Constitutional: Negative.    HENT: Negative.     Eyes: Negative.    Respiratory: Negative.     Cardiovascular: Negative.    Gastrointestinal: Negative.    Endocrine: Negative.    Genitourinary: Negative.    Musculoskeletal: Negative.    Skin: Negative.     Allergic/Immunologic: Negative.    Neurological: Negative.    Hematological: Negative.    Psychiatric/Behavioral: Negative.         Medical / Social / Family History     -------------------------------------    Abscess    Abscess of groin, left    COPD (chronic obstructive pulmonary disease)    Coronary artery disease    Diabetes mellitus    Yesi's gangrene    Yesi's gangrene of scrotum    hyperlipidemia    Hypertension        Past Surgical History:   Procedure Laterality Date    CORONARY ANGIOPLASTY      EXCISION, LESION, LOWER EXTREMITY Left 5/25/2023    Procedure: EXCISION, LESION, LOWER EXTREMITY;  Surgeon: South Terry MD;  Location: [facility] OR;  Service: General;  Laterality: Left;    TONSILLECTOMY  07/2018    WOUND DEBRIDEMENT N/A 5/10/2023    Procedure: DEBRIDEMENT, WOUND;  Surgeon: Smith Lay MD;  Location: [facility] OR;  Service: General;  Laterality: N/A;  perineum debridement, lithotomy       Social History     Socioeconomic History    Marital status: Single   Tobacco Use    Smoking status: Every Day     Current packs/day: 0.50     Average packs/day: 0.5 packs/day for 60.6 years (30.3 ttl pk-yrs)     Types: Cigarettes     Start date: 5/8/1964    Smokeless tobacco: Never   Substance and Sexual Activity    Alcohol use: Yes     Comment: beer 2x/month    Drug use: Yes     Types: Marijuana    Sexual activity: Not Currently     Social Drivers of Health     Financial Resource Strain: Low Risk  (5/19/2023)    Overall Financial Resource Strain (CARDIA)     Difficulty of Paying Living Expenses: Not hard at all   Food Insecurity: No Food Insecurity (5/19/2023)    Hunger Vital Sign     Worried About Running Out of Food in the Last Year: Never true     Ran Out of Food in the Last Year: Never true   Transportation Needs: No Transportation Needs (5/19/2023)    PRAPARE - Transportation     Lack of Transportation (Medical): No     Lack of Transportation (Non-Medical): No   Physical Activity: Inactive  "(5/19/2023)    Exercise Vital Sign     Days of Exercise per Week: 0 days     Minutes of Exercise per Session: 0 min   Stress: No Stress Concern Present (5/19/2023)    [employer] - Occupational Stress Questionnaire     Feeling of Stress : Not at all   Housing Stability: Low Risk  (5/19/2023)    Housing Stability Vital Sign     Unable to Pay for Housing in the Last Year: No     Number of Places Lived in the Last Year: 1     Unstable Housing in the Last Year: No        Family History   Problem Relation Name Age of Onset    Hypertension Mother      Heart failure Mother      Heart attack Mother      Coronary artery disease Mother      Cancer Father      Cancer Sister          Medications and Allergies     Medications  Current Outpatient Medications   Medication Instructions    albuterol (PROVENTIL/VENTOLIN HFA) 90 mcg/actuation inhaler 2 puffs, Inhalation, Every 6 hours PRN    aspirin 81 mg, Oral, Daily    atorvastatin (LIPITOR) 80 mg, Oral, Daily    BD ULTRA-FINE GOOD PEN NEEDLE 32 gauge x 5/32" Ndle 1 Syringe    clopidogreL (PLAVIX) 75 mg, Oral, Daily    docusate sodium (COLACE) 100 mg, As needed (PRN)    famotidine (PEPCID) 20 mg, Oral, 2 times daily    ferrous sulfate 324 mg, Oral, Daily    finasteride (PROSCAR) 5 mg, Oral, Daily    fluticasone-salmeterol diskus inhaler 250-50 mcg 1 puff, Inhalation, 2 times daily, Controller    folic acid (FOLVITE) 1 mg, Oral, Daily    gabapentin (NEURONTIN) 300 mg, Oral, 3 times daily PRN    glipiZIDE (GLUCOTROL) 10 mg, Oral, 2 times daily    lisinopriL 10 mg, Oral, Daily    metFORMIN (GLUCOPHAGE) 1,000 mg, Oral, 2 times daily with meals    metoprolol tartrate (LOPRESSOR) 50 mg, Oral, 2 times daily    sodium bicarbonate 650 mg, Oral, 2 times daily    tamsulosin (FLOMAX) 0.8 mg, Oral, Daily    TRESIBA FLEXTOUCH U-200 200 unit/mL (3 mL) insulin pen INJECT 64 UNITS UNDER THE SKIN EVERY DAY    TRULICITY 4.5 mg, Subcutaneous, Every 7 days " "      Allergies  Review of patient's allergies indicates:  No Known Allergies    Physical Examination     Visit Vitals  /79 (BP Location: Right arm, Patient Position: Sitting)   Pulse 98   Temp 98 °F (36.7 °C) (Oral)   Resp 18   Ht 5' 10" (1.778 m)   Wt 110.1 kg (242 lb 12.8 oz)   SpO2 96%   BMI 34.84 kg/m²       Physical Exam  Constitutional:       General: He is not in acute distress.     Appearance: Normal appearance. He is obese. He is not ill-appearing, toxic-appearing or diaphoretic.   HENT:      Head: Normocephalic.   Neck:      Vascular: No carotid bruit.   Cardiovascular:      Rate and Rhythm: Normal rate and regular rhythm.      Heart sounds: No murmur heard.  Pulmonary:      Effort: Pulmonary effort is normal. No respiratory distress.      Breath sounds: Normal breath sounds. No stridor. No wheezing, rhonchi or rales.   Lymphadenopathy:      Cervical: No cervical adenopathy.   Skin:     General: Skin is warm and dry.   Neurological:      General: No focal deficit present.      Mental Status: He is alert and oriented to person, place, and time. Mental status is at baseline.      Motor: No weakness.      Coordination: Coordination normal.      Gait: Gait normal.   Psychiatric:         Mood and Affect: Mood normal.         Behavior: Behavior normal.         Thought Content: Thought content normal.         Judgment: Judgment normal.           Results     Lab Results   Component Value Date    WBC 10.36 12/02/2024    RBC 6.01 12/02/2024    HGB 15.5 12/02/2024    HCT 46.1 12/02/2024    MCV 76.7 (L) 12/02/2024    MCH 25.8 (L) 12/02/2024    MCHC 33.6 12/02/2024    RDW 19.4 (H) 12/02/2024     12/02/2024    MPV 10.1 12/02/2024     Sodium   Date Value Ref Range Status   12/02/2024 136 136 - 145 mmol/L Final     Potassium   Date Value Ref Range Status   12/02/2024 4.9 3.5 - 5.1 mmol/L Final     Chloride   Date Value Ref Range Status   12/02/2024 102 98 - 107 mmol/L Final     CO2   Date Value Ref Range " Status   12/02/2024 28 23 - 31 mmol/L Final     Blood Urea Nitrogen   Date Value Ref Range Status   12/02/2024 25.4 8.4 - 25.7 mg/dL Final     Creatinine   Date Value Ref Range Status   12/02/2024 1.37 (H) 0.72 - 1.25 mg/dL Final     Calcium   Date Value Ref Range Status   12/02/2024 9.8 8.8 - 10.0 mg/dL Final     Albumin   Date Value Ref Range Status   12/02/2024 3.4 3.4 - 4.8 g/dL Final     Bilirubin Total   Date Value Ref Range Status   12/02/2024 0.3 <=1.5 mg/dL Final     ALP   Date Value Ref Range Status   12/02/2024 121 40 - 150 unit/L Final     AST   Date Value Ref Range Status   12/02/2024 23 5 - 34 unit/L Final     ALT   Date Value Ref Range Status   12/02/2024 25 0 - 55 unit/L Final     Estimated GFR-Non    Date Value Ref Range Status   06/26/2018 65 (L) >>=90 mL/min Final     Lab Results   Component Value Date    CHOL 135 12/02/2024     Lab Results   Component Value Date    HDL 35 12/02/2024     Lab Results   Component Value Date    TRIG 246 (H) 12/02/2024     Lab Results   Component Value Date    LDL 51.00 12/02/2024     Lab Results   Component Value Date    TSH 1.883 12/02/2024     Lab Results   Component Value Date    PHUR 6.0 07/17/2023    SPECGRAV 1.030 07/17/2023    PROTEINUA Negative 06/16/2023    GLUCUA Negative 06/16/2023    KETONESU neg 07/17/2023    OCCULTUA Small (A) 06/16/2023    NITRITE pos 07/17/2023    LEUKOCYTESUR Negative 06/16/2023     Lab Results   Component Value Date    HGBA1C 11.6 (H) 12/02/2024    HGBA1C 13.9 (H) 03/12/2024    HGBA1C 9.4 (H) 08/28/2023     Lab Results   Component Value Date    MICALBCREAT 538.4 (H) 12/02/2024        Assessment       ICD-10-CM ICD-9-CM   1. Type 2 diabetes mellitus without complication, with long-term current use of insulin  E11.9 250.00    Z79.4 V58.67   2. Needs flu shot  Z23 V04.81   3. Pulmonary nodules  R91.8 793.19   4. Coronary artery disease, unspecified vessel or lesion type, unspecified whether angina present, unspecified  whether native or transplanted heart  I25.10 414.00   5. Hyperlipidemia LDL goal <70  E78.5 272.4   6. Primary hypertension  I10 401.9   7. Type 2 diabetes mellitus with hyperglycemia, with long-term current use of insulin  E11.65 250.00    Z79.4 790.29     V58.67   8. Cigarette nicotine dependence with nicotine-induced disorder  F17.219 292.9   9. Type 2 diabetes mellitus with diabetic neuropathy, without long-term current use of insulin  E11.40 250.60     357.2   10. Chronic obstructive pulmonary disease, unspecified COPD type  J44.9 496   11. Benign prostatic hyperplasia, unspecified whether lower urinary tract symptoms present  N40.0 600.00   12. Chronic kidney disease, stage 3a  N18.31 585.3   13. Dependent for transportation  Z74.8 V60.89   14. Illiteracy and low-level literacy  Z55.0 V62.3   15. Medical non-compliance  Z91.199 V15.81   16. Peripheral artery disease  I73.9 443.9       Plan       Problem List Items Addressed This Visit          Pulmonary    Chronic obstructive pulmonary disease    Relevant Medications    albuterol (PROVENTIL/VENTOLIN HFA) 90 mcg/actuation inhaler    fluticasone-salmeterol diskus inhaler 250-50 mcg    Pulmonary nodules    Current Assessment & Plan     LDCT 9/2024  FINDINGS:  Lungs: The lungs are clear.  No infiltrate is seen.  There are few scattered punctate pulmonary nodules seen bilaterally which appear to be less than 6 mm in size for example left upper lobe image 186 series 2, left lingula image 268 series 2, and right upper lobe image 185 series 2.  No large or suspicious nodule is seen.  There is some scarring in the right middle lobe.  This is stable.     Pleura:   No effusion..  There is a calcified pleural plaque in the right hemithorax extending from the right upper hemithorax to the right lower hemithorax.  This is stable since the prior examination.     Heart and pericardium: Normal size without effusion.     Aorta and vasculature: unremarkable     Chest wall and  skeletal structures: Unremarkable except age-appropriate degenerative changes.     Upper abdomen: Unremarkable.     Impression:     Lung-RADS Category:  2 - Benign Appearance or Behavior - continue annual screening with LDCT in 12 months.            Cardiac/Vascular    Coronary artery disease    Current Assessment & Plan     Keep f/u with Cardiology         Relevant Medications    atorvastatin (LIPITOR) 80 MG tablet    clopidogreL (PLAVIX) 75 mg tablet    Hyperlipidemia LDL goal <70    Current Assessment & Plan     Lab Results   Component Value Date    CHOL 135 12/02/2024     Lab Results   Component Value Date    HDL 35 12/02/2024     Lab Results   Component Value Date    TRIG 246 (H) 12/02/2024     Lab Results   Component Value Date    LDL 51.00 12/02/2024     Avoid tobacco/ alcohol  Follow low fat/low cholesterol diet such as avoid/ decrease hurtado, sausage, fried foods, cookies, cakes, chips, cheese, whole milk, butter, mayonnaise. Add olive oil, avocados, lean meats, fresh fruits/ vegetables, heart healthy nuts to diet.  Educated on health benefits of exercise 5 days/ week of at least 30 minutes moderate intensity exercise (brisk walking) and 2 days/ week of muscle strength activities  Daily ASA 81 mg for CV prevention  Continue current medication regimen         Relevant Medications    atorvastatin (LIPITOR) 80 MG tablet    Hypertension    Current Assessment & Plan     BP Readings from Last 3 Encounters:   12/04/24 121/79   09/10/24 121/78   08/27/24 108/74     Follow low sodium diet, < 2 gm/day (avoid high salty foods such as processed meats/ sausage/hurtado/ sandwich meat, chips, pickles, cheese, crackers and soft drinks/ electrolyte replacement drinks).  Avoid tobacco/ alcohol use  Educated on health benefits of at least 5 days/ week of 30 minutes moderate intensity exercise (brisk walking) and 2 or more days/ week of muscle strength activities  Daily ASA 81 mg for CV prevention  Continue current medication  regimen           Relevant Medications    atorvastatin (LIPITOR) 80 MG tablet    Peripheral artery disease    Current Assessment & Plan     Provided pt with contact info to r/s [facility] and vascular appointments            Renal/    BPH (benign prostatic hyperplasia)    Relevant Medications    finasteride (PROSCAR) 5 mg tablet    tamsulosin (FLOMAX) 0.4 mg Cap    Chronic kidney disease, stage 3a    Current Assessment & Plan     Pt to increase water intake         Relevant Orders    Ambulatory referral/consult to Nephrology       Endocrine    Diabetes mellitus - Primary    Current Assessment & Plan     Lab Results   Component Value Date    HGBA1C 11.6 (H) 12/02/2024     CBGs:  Hypoglycemia episodes:  Microalbumin:   Lab Results   Component Value Date    MICALBCREAT 538.4 (H) 12/02/2024     Educated on ADA diet: eliminate/decrease high carbohydrate foods (rice, pasta, bread, potatoes (french fries, chips), candy, sweets, fruit juices, canned fruit, junk food, crackers, carbonated beverages)  Educated on health benefits of at least 5 days/ week of 30 minutes moderate intensity exercise (brisk walking) and 2 or more days/ week of muscle strength activities  Avoid alcohol or tobacco use  Eye exam: fundus photos ordered  Foot exam: 8/27/24  Per recommendations, continue with ACEI/ARB, Daily ASA 81 mg for CV prevention, Statin therapy  Increase Glipizide to 1 tablet BID and Trulicity to 4.5 mg once weekly  HH referral to assist for diet education, cbg checks and compliance with medications  Pt refuses to check cbgs and insulin to self           Relevant Medications    glipiZIDE (GLUCOTROL) 10 MG TR24    dulaglutide (TRULICITY) 4.5 mg/0.5 mL pen injector    metFORMIN (GLUCOPHAGE) 1000 MG tablet    Type 2 diabetes mellitus with hyperglycemia, with long-term current use of insulin    Current Assessment & Plan     See DM         Relevant Medications    glipiZIDE (GLUCOTROL) 10 MG TR24    dulaglutide (TRULICITY) 4.5 mg/0.5 mL pen  injector    atorvastatin (LIPITOR) 80 MG tablet    metFORMIN (GLUCOPHAGE) 1000 MG tablet       Palliative Care    Medical non-compliance    Current Assessment & Plan     Patient non compliant with CBG checks and administration of insulin. He misses many appointments and unfortunately relies on transportation services which is likely contributing to no shows for visits/ testing,etc. He previously had home health but this was discontinued. Will re order for home health. Referral for [facility] and pt will require assistance from nurse to complete as pt is illiterate.          Relevant Orders    Ambulatory referral/consult to Home Health       Other    Cigarette nicotine dependence with nicotine-induced disorder    Current Assessment & Plan     0.5 ppd  Discussed for at least 3 minutes importance of smoking cessation and health benefits, including adverse effects to patient's health and organs.  Encouraged cessation.  LDCT 9/2024         Dependent for transportation    Current Assessment & Plan     [facility] referral          Relevant Orders    Ambulatory referral/consult to Home Health    Illiteracy and low-level literacy    Relevant Orders    Ambulatory referral/consult to Home Health     Other Visit Diagnoses       Needs flu shot        Relevant Medications    influenza (adjuvanted) (Fluad) 45 mcg/0.5 mL IM vaccine (> or = 64 yo) 0.5 mL (Completed)            Future Appointments   Date Time Provider Department Center   2/26/2025  9:20 AM Cammy Christianson NP [facility]   3/12/2025  9:45 AM Nissa De La Rosa, WENDY [facility]      I spent a total of 50 minutes on the day of the visit.  This includes face to face time and non-face to face time preparing to see the patient (eg, review of tests), obtaining and/or reviewing separately obtained history, documenting clinical information in the electronic or other health record, independently interpreting results and communicating results to the  patient/family/caregiver, or care coordinator.    Follow up in about 4 weeks (around 1/1/2025) for diabetes, medication follow up.    Signature:  Cammy Christianson NP  [facility]  [address] [facility] [address]    Date of encounter: 12/4/24

## 2024-12-04 NOTE — ASSESSMENT & PLAN NOTE
BP Readings from Last 3 Encounters:   12/04/24 121/79   09/10/24 121/78   08/27/24 108/74     Follow low sodium diet, < 2 gm/day (avoid high salty foods such as processed meats/ sausage/hurtado/ sandwich meat, chips, pickles, cheese, crackers and soft drinks/ electrolyte replacement drinks).  Avoid tobacco/ alcohol use  Educated on health benefits of at least 5 days/ week of 30 minutes moderate intensity exercise (brisk walking) and 2 or more days/ week of muscle strength activities  Daily ASA 81 mg for CV prevention  Continue current medication regimen

## 2024-12-05 NOTE — ASSESSMENT & PLAN NOTE
Patient non compliant with CBG checks and administration of insulin. He misses many appointments and unfortunately relies on transportation services which is likely contributing to no shows for visits/ testing,etc. He previously had home health but this was discontinued. Will re order for home health. Referral for [facility] and pt will require assistance from nurse to complete as pt is illiterate.

## 2024-12-05 NOTE — ASSESSMENT & PLAN NOTE
0.5 ppd  Discussed for at least 3 minutes importance of smoking cessation and health benefits, including adverse effects to patient's health and organs.  Encouraged cessation.  LDCT 9/2024

## 2024-12-05 NOTE — ASSESSMENT & PLAN NOTE
Lab Results   Component Value Date    HGBA1C 11.6 (H) 12/02/2024     CBGs:  Hypoglycemia episodes:  Microalbumin:   Lab Results   Component Value Date    MICALBCREAT 538.4 (H) 12/02/2024     Educated on ADA diet: eliminate/decrease high carbohydrate foods (rice, pasta, bread, potatoes (french fries, chips), candy, sweets, fruit juices, canned fruit, junk food, crackers, carbonated beverages)  Educated on health benefits of at least 5 days/ week of 30 minutes moderate intensity exercise (brisk walking) and 2 or more days/ week of muscle strength activities  Avoid alcohol or tobacco use  Eye exam: fundus photos ordered  Foot exam: 8/27/24  Per recommendations, continue with ACEI/ARB, Daily ASA 81 mg for CV prevention, Statin therapy  Increase Glipizide to 1 tablet BID and Trulicity to 4.5 mg once weekly  HH referral to assist for diet education, cbg checks and compliance with medications  Pt refuses to check cbgs and insulin to self

## 2024-12-06 PROCEDURE — G0180 MD CERTIFICATION HHA PATIENT: HCPCS | Mod: ,,, | Performed by: NURSE PRACTITIONER

## 2024-12-09 DIAGNOSIS — Z79.4 TYPE 2 DIABETES MELLITUS WITH HYPERGLYCEMIA, WITH LONG-TERM CURRENT USE OF INSULIN: Primary | ICD-10-CM

## 2024-12-09 DIAGNOSIS — E11.65 TYPE 2 DIABETES MELLITUS WITH HYPERGLYCEMIA, WITH LONG-TERM CURRENT USE OF INSULIN: Primary | ICD-10-CM

## 2024-12-11 ENCOUNTER — OUTPATIENT CASE MANAGEMENT (OUTPATIENT)
Dept: ADMINISTRATIVE | Facility: OTHER | Age: 69
End: 2024-12-11
Payer: MEDICARE

## 2024-12-13 ENCOUNTER — OUTPATIENT CASE MANAGEMENT (OUTPATIENT)
Dept: ADMINISTRATIVE | Facility: OTHER | Age: 69
End: 2024-12-13
Payer: MEDICARE

## 2024-12-17 ENCOUNTER — TELEPHONE (OUTPATIENT)
Dept: INTERNAL MEDICINE | Facility: CLINIC | Age: 69
End: 2024-12-17
Payer: MEDICARE

## 2024-12-17 DIAGNOSIS — E11.65 TYPE 2 DIABETES MELLITUS WITH HYPERGLYCEMIA, WITH LONG-TERM CURRENT USE OF INSULIN: Primary | ICD-10-CM

## 2024-12-17 DIAGNOSIS — Z79.4 TYPE 2 DIABETES MELLITUS WITH HYPERGLYCEMIA, WITH LONG-TERM CURRENT USE OF INSULIN: Primary | ICD-10-CM

## 2024-12-17 RX ORDER — INSULIN PUMP SYRINGE, 3 ML
EACH MISCELLANEOUS
Qty: 1 EACH | Refills: 0 | Status: SHIPPED | OUTPATIENT
Start: 2024-12-17

## 2024-12-17 RX ORDER — LANCETS
EACH MISCELLANEOUS
Qty: 200 EACH | Refills: 5 | Status: SHIPPED | OUTPATIENT
Start: 2024-12-17

## 2024-12-17 NOTE — TELEPHONE ENCOUNTER
----- Message from Nurse Rhodes sent at 12/17/2024  8:27 AM CST -----  Regarding: DM testing supplies    ----- Message -----  From: Sharon De Leon  Sent: 12/16/2024   3:43 PM CST  To: Sammy RANGEL Staff    .Who Called: John Addison        Preferred Method of Contact: Phone Call  Patient's Preferred Phone Number on File: [phone]   Best Call Back Number, if different: [phone]  Additional Information: nurse calling for pt diabetes supplies new meter,lantus and stripes at local pharmacy

## 2024-12-23 ENCOUNTER — TELEPHONE (OUTPATIENT)
Dept: ADMINISTRATIVE | Facility: CLINIC | Age: 69
End: 2024-12-23
Payer: MEDICARE

## 2024-12-23 DIAGNOSIS — E11.65 TYPE 2 DIABETES MELLITUS WITH HYPERGLYCEMIA, WITH LONG-TERM CURRENT USE OF INSULIN: Primary | ICD-10-CM

## 2024-12-23 DIAGNOSIS — Z79.4 TYPE 2 DIABETES MELLITUS WITH HYPERGLYCEMIA, WITH LONG-TERM CURRENT USE OF INSULIN: Primary | ICD-10-CM

## 2024-12-30 ENCOUNTER — TELEPHONE (OUTPATIENT)
Dept: INTERNAL MEDICINE | Facility: CLINIC | Age: 69
End: 2024-12-30
Payer: MEDICARE

## 2024-12-30 DIAGNOSIS — N18.31 STAGE 3A CHRONIC KIDNEY DISEASE: Primary | ICD-10-CM

## 2024-12-30 DIAGNOSIS — H91.90 HEARING LOSS, UNSPECIFIED HEARING LOSS TYPE, UNSPECIFIED LATERALITY: Primary | ICD-10-CM

## 2024-12-30 NOTE — TELEPHONE ENCOUNTER
----- Message from Nurse Childers sent at 12/30/2024  2:33 PM CST -----  Regarding: FW: REFERRAL    ----- Message -----  From: Geetha Gilbert  Sent: 12/30/2024  10:38 AM CST  To: Sammy RANGEL Staff  Subject: REFERRAL                                         Patient requesting a ENT Referral for hearing Loss.

## 2025-01-02 ENCOUNTER — OUTPATIENT CASE MANAGEMENT (OUTPATIENT)
Dept: ADMINISTRATIVE | Facility: OTHER | Age: 70
End: 2025-01-02
Payer: MEDICARE

## 2025-01-03 ENCOUNTER — LAB VISIT (OUTPATIENT)
Dept: LAB | Facility: HOSPITAL | Age: 70
End: 2025-01-03
Attending: INTERNAL MEDICINE
Payer: MEDICARE

## 2025-01-03 DIAGNOSIS — N18.31 STAGE 3A CHRONIC KIDNEY DISEASE: ICD-10-CM

## 2025-01-03 LAB
ALBUMIN SERPL-MCNC: 3.2 G/DL (ref 3.4–4.8)
ALBUMIN/GLOB SERPL: 0.7 RATIO (ref 1.1–2)
ALP SERPL-CCNC: 138 UNIT/L (ref 40–150)
ALT SERPL-CCNC: 38 UNIT/L (ref 0–55)
ANION GAP SERPL CALC-SCNC: 6 MEQ/L
AST SERPL-CCNC: 32 UNIT/L (ref 5–34)
BACTERIA #/AREA URNS AUTO: ABNORMAL /HPF
BASOPHILS # BLD AUTO: 0.06 X10(3)/MCL
BASOPHILS NFR BLD AUTO: 0.7 %
BILIRUB SERPL-MCNC: 0.6 MG/DL
BILIRUB UR QL STRIP.AUTO: NEGATIVE
BUN SERPL-MCNC: 17.8 MG/DL (ref 8.4–25.7)
CALCIUM SERPL-MCNC: 9.4 MG/DL (ref 8.8–10)
CHLORIDE SERPL-SCNC: 104 MMOL/L (ref 98–107)
CLARITY UR: ABNORMAL
CO2 SERPL-SCNC: 28 MMOL/L (ref 23–31)
COLOR UR AUTO: YELLOW
CREAT SERPL-MCNC: 1.13 MG/DL (ref 0.72–1.25)
CREAT UR-MCNC: 138.4 MG/DL (ref 63–166)
CREAT/UREA NIT SERPL: 16
EOSINOPHIL # BLD AUTO: 0.2 X10(3)/MCL (ref 0–0.9)
EOSINOPHIL NFR BLD AUTO: 2.4 %
ERYTHROCYTE [DISTWIDTH] IN BLOOD BY AUTOMATED COUNT: 18.9 % (ref 11.5–17)
GFR SERPLBLD CREATININE-BSD FMLA CKD-EPI: >60 ML/MIN/1.73/M2
GLOBULIN SER-MCNC: 4.6 GM/DL (ref 2.4–3.5)
GLUCOSE SERPL-MCNC: 293 MG/DL (ref 82–115)
GLUCOSE UR QL STRIP: ABNORMAL
HCT VFR BLD AUTO: 46.9 % (ref 42–52)
HGB BLD-MCNC: 15.6 G/DL (ref 14–18)
HGB UR QL STRIP: ABNORMAL
IMM GRANULOCYTES # BLD AUTO: 0.05 X10(3)/MCL (ref 0–0.04)
IMM GRANULOCYTES NFR BLD AUTO: 0.6 %
KETONES UR QL STRIP: NEGATIVE
LEUKOCYTE ESTERASE UR QL STRIP: 75
LYMPHOCYTES # BLD AUTO: 2.84 X10(3)/MCL (ref 0.6–4.6)
LYMPHOCYTES NFR BLD AUTO: 33.6 %
MCH RBC QN AUTO: 26.3 PG (ref 27–31)
MCHC RBC AUTO-ENTMCNC: 33.3 G/DL (ref 33–36)
MCV RBC AUTO: 79 FL (ref 80–94)
MONOCYTES # BLD AUTO: 0.83 X10(3)/MCL (ref 0.1–1.3)
MONOCYTES NFR BLD AUTO: 9.8 %
NEUTROPHILS # BLD AUTO: 4.46 X10(3)/MCL (ref 2.1–9.2)
NEUTROPHILS NFR BLD AUTO: 52.9 %
NITRITE UR QL STRIP: ABNORMAL
NRBC BLD AUTO-RTO: 0 %
PH UR STRIP: 6 [PH]
PHOSPHATE SERPL-MCNC: 3.1 MG/DL (ref 2.3–4.7)
PLATELET # BLD AUTO: 228 X10(3)/MCL (ref 130–400)
PMV BLD AUTO: 10.5 FL (ref 7.4–10.4)
POTASSIUM SERPL-SCNC: 4.5 MMOL/L (ref 3.5–5.1)
PROT SERPL-MCNC: 7.8 GM/DL (ref 5.8–7.6)
PROT UR QL STRIP: ABNORMAL
PROT UR STRIP-MCNC: 138.8 MG/DL
RBC # BLD AUTO: 5.94 X10(6)/MCL (ref 4.7–6.1)
RBC #/AREA URNS AUTO: ABNORMAL /HPF
SODIUM SERPL-SCNC: 138 MMOL/L (ref 136–145)
SP GR UR STRIP.AUTO: 1.03 (ref 1–1.03)
SQUAMOUS #/AREA URNS LPF: ABNORMAL /HPF
URINE PROTEIN/CREATININE RATIO (OLG): 1
UROBILINOGEN UR STRIP-ACNC: NORMAL
WBC # BLD AUTO: 8.44 X10(3)/MCL (ref 4.5–11.5)
WBC #/AREA URNS AUTO: ABNORMAL /HPF
YEAST BUDDING URNS QL: ABNORMAL /HPF

## 2025-01-03 PROCEDURE — 82570 ASSAY OF URINE CREATININE: CPT

## 2025-01-03 PROCEDURE — 85025 COMPLETE CBC W/AUTO DIFF WBC: CPT

## 2025-01-03 PROCEDURE — 80053 COMPREHEN METABOLIC PANEL: CPT

## 2025-01-03 PROCEDURE — 36415 COLL VENOUS BLD VENIPUNCTURE: CPT

## 2025-01-03 PROCEDURE — 81001 URINALYSIS AUTO W/SCOPE: CPT

## 2025-01-03 PROCEDURE — 84100 ASSAY OF PHOSPHORUS: CPT

## 2025-01-06 ENCOUNTER — OFFICE VISIT (OUTPATIENT)
Dept: NEPHROLOGY | Facility: CLINIC | Age: 70
End: 2025-01-06
Payer: MEDICARE

## 2025-01-06 VITALS
TEMPERATURE: 98 F | HEART RATE: 92 BPM | WEIGHT: 245.63 LBS | DIASTOLIC BLOOD PRESSURE: 80 MMHG | OXYGEN SATURATION: 96 % | HEIGHT: 70 IN | SYSTOLIC BLOOD PRESSURE: 110 MMHG | RESPIRATION RATE: 20 BRPM | BODY MASS INDEX: 35.17 KG/M2

## 2025-01-06 DIAGNOSIS — E11.21 TYPE 2 DIABETES MELLITUS WITH DIABETIC NEPHROPATHY, WITHOUT LONG-TERM CURRENT USE OF INSULIN: ICD-10-CM

## 2025-01-06 DIAGNOSIS — Z87.438 HISTORY OF FOURNIER'S GANGRENE: ICD-10-CM

## 2025-01-06 DIAGNOSIS — E66.01 SEVERE OBESITY (BMI 35.0-39.9) WITH COMORBIDITY: ICD-10-CM

## 2025-01-06 DIAGNOSIS — F17.200 CURRENT SMOKER: ICD-10-CM

## 2025-01-06 DIAGNOSIS — R80.1 PERSISTENT PROTEINURIA: ICD-10-CM

## 2025-01-06 DIAGNOSIS — I10 PRIMARY HYPERTENSION: ICD-10-CM

## 2025-01-06 DIAGNOSIS — N18.31 CHRONIC KIDNEY DISEASE, STAGE 3A: Primary | ICD-10-CM

## 2025-01-06 PROCEDURE — 99204 OFFICE O/P NEW MOD 45 MIN: CPT | Mod: S$PBB,,, | Performed by: NURSE PRACTITIONER

## 2025-01-06 PROCEDURE — 99215 OFFICE O/P EST HI 40 MIN: CPT | Mod: PBBFAC | Performed by: NURSE PRACTITIONER

## 2025-01-06 PROCEDURE — 99999 PR PBB SHADOW E&M-EST. PATIENT-LVL V: CPT | Mod: PBBFAC,,, | Performed by: NURSE PRACTITIONER

## 2025-01-06 NOTE — PROGRESS NOTES
[facility] [place] [facility]phrology New Referral Office Note    HPI  John Addison, 70 y.o. male, presents to office as a new patient.  Referred by PCP for evaluation of CKD 2/3 and apparent recent ZORAIDA.  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  His daughter is present during the visit reports that patient does drink alcohol very frequently and we will not take his insulin during the time that he is drinking.  He also is a current smoker.  PCP documented issues with noncompliance.  At time of referral creatinine was 1.52.  Today is back down to baseline of 1.13.  U PCR with a 1000 mg proteinuria.  He is maintained on ACE inhibitor.  He does complain of some dysuria that is new since surgery for Yesi's gangrene and completing wound care.  He does drink water but does drink several cups of coffee a day as well      Patient denies taking NSAIDs or new antibiotics. Also denies recent episode of dehydration, diarrhea, vomiting, acute illness, hospitalization, recent angiograms or exposure to IV radiocontrast.        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    hyperlipidemia     Hypertension      Patient Active Problem List   Diagnosis    Coronary artery disease    Hypertension    Hyperlipidemia LDL goal <70    Pain in both lower extremities    Abdominal swelling    Pulmonary nodules    Abnormal liver function tests    Anemia    Chronic allergic conjunctivitis    Chronic obstructive pulmonary disease    Current drinker of alcohol    Intra-abdominal lymphadenopathy    Tegmen defect of base of skull    Cigarette nicotine dependence with nicotine-induced disorder    Atypical chest pain    Xerosis of skin    Dystrophic nail     Onychomycosis of multiple toenails with type 2 diabetes mellitus    Avulsion of toenail of left foot    Diabetes mellitus    Severe obesity (BMI 35.0-39.9) with comorbidity    Peripheral artery disease    Hyperglycemia due to diabetes mellitus    Type 2 diabetes mellitus with hyperglycemia, with long-term current use of insulin    Hearing loss    BPH (benign prostatic hyperplasia)    Chronic kidney disease, stage 3a    Dependent for transportation    Illiteracy and low-level literacy    Medical non-compliance       Surgical History:   Past Surgical History:   Procedure Laterality Date    CORONARY ANGIOPLASTY      EXCISION, LESION, LOWER EXTREMITY Left 5/25/2023    Procedure: EXCISION, LESION, LOWER EXTREMITY;  Surgeon: South Terry MD;  Location: [facility] OR;  Service: General;  Laterality: Left;    TONSILLECTOMY  07/2018    WOUND DEBRIDEMENT N/A 5/10/2023    Procedure: DEBRIDEMENT, WOUND;  Surgeon: Smith Lay MD;  Location: [facility] OR;  Service: General;  Laterality: N/A;  perineum debridement, lithotomy       Family History:   Family History   Problem Relation Name Age of Onset    Hypertension Mother      Heart failure Mother      Heart attack Mother      Coronary artery disease Mother      Cancer Father      Cancer Sister         Social History:   Social History     Tobacco Use    Smoking status: Every Day     Current packs/day: 0.50     Average packs/day: 0.5 packs/day for 60.7 years (30.3 ttl pk-yrs)     Types: Cigarettes     Start date: 5/8/1964     Passive exposure: Current    Smokeless tobacco: Never   Substance Use Topics    Alcohol use: Yes     Comment: beer 2x/month       Allergies:  Review of patient's allergies indicates:  No Known Allergies    Medications:  Outpatient Medications Marked as Taking for the 1/6/25 encounter (Office Visit) with Georgina Cai ACNP   Medication Sig Dispense Refill    albuterol (PROVENTIL/VENTOLIN HFA) 90 mcg/actuation inhaler Inhale 2 puffs into the lungs  "every 6 (six) hours as needed for Wheezing or Shortness of Breath. 18 g 5    aspirin 81 MG Chew Take 1 tablet (81 mg total) by mouth once daily. 90 tablet 3    atorvastatin (LIPITOR) 80 MG tablet Take 1 tablet (80 mg total) by mouth once daily. 90 tablet 1    BD ULTRA-FINE GOOD PEN NEEDLE 32 gauge x 5/32" Ndle Inject 1 Syringe into the skin.      blood sugar diagnostic Strp To check BG three times daily, to use with insurance preferred meter 200 each 5    blood-glucose meter kit To check BG three times daily, to use with insurance preferred meter 1 each 0    clopidogreL (PLAVIX) 75 mg tablet Take 1 tablet (75 mg total) by mouth once daily. 90 tablet 1    docusate sodium (COLACE) 100 MG capsule Take 100 mg by mouth as needed for Constipation.      dulaglutide (TRULICITY) 4.5 mg/0.5 mL pen injector Inject 4.5 mg into the skin every 7 days. 4 pen 11    famotidine (PEPCID) 40 MG tablet Take 0.5 tablets (20 mg total) by mouth 2 (two) times daily. 180 tablet 1    ferrous sulfate 324 mg (65 mg iron) TbEC Take 1 tablet (324 mg total) by mouth once daily. 90 tablet 3    finasteride (PROSCAR) 5 mg tablet Take 1 tablet (5 mg total) by mouth once daily. 90 tablet 1    fluticasone-salmeterol diskus inhaler 250-50 mcg Inhale 1 puff into the lungs 2 (two) times daily. Controller 180 each 1    folic acid (FOLVITE) 1 MG tablet Take 1 tablet (1 mg total) by mouth once daily. 90 tablet 1    gabapentin (NEURONTIN) 300 MG capsule Take 1 capsule (300 mg total) by mouth 3 (three) times daily as needed (pain). 30 capsule 1    glipiZIDE (GLUCOTROL) 10 MG TR24 Take 1 tablet (10 mg total) by mouth 2 (two) times a day. 180 tablet 1    lancets Misc To check BG three times daily, to use with insurance preferred meter 200 each 5    lisinopriL 10 MG tablet Take 1 tablet (10 mg total) by mouth once daily. 90 tablet 3    metFORMIN (GLUCOPHAGE) 1000 MG tablet Take 1 tablet (1,000 mg total) by mouth 2 (two) times daily with meals. 180 tablet 1    " "metoprolol tartrate (LOPRESSOR) 50 MG tablet Take 1 tablet (50 mg total) by mouth 2 (two) times daily. 180 tablet 3    sodium bicarbonate 650 MG tablet Take 1 tablet (650 mg total) by mouth 2 (two) times daily. 60 tablet 0    tamsulosin (FLOMAX) 0.4 mg Cap Take 2 capsules (0.8 mg total) by mouth once daily. 180 capsule 1    TRESIBA FLEXTOUCH U-200 200 unit/mL (3 mL) insulin pen INJECT 64 UNITS UNDER THE SKIN EVERY DAY 9 mL 1       Review of Systems:    Constitutional: Denies fever, fatigue, generalized weakness  Skin: Denies wounds, no rashes, no itching, no new skin lesions  Respiratory:  Denies cough, shortness of breath, or wheezing  Cardiovascular: Denies chest pain, palpitations, or swelling  Gastrointestional: Denies abdominal pain, nausea, vomiting, diarrhea, or constipation  Genitourinary: ++dysuria, no hematuria, foamy urine, or incontinence; reports able to empty bladder  Musculoskeletal: Denies back or flank pain  Neurological: Denies headaches, dizziness, paresthesias, tremors or focal weakness      Vital Signs:  /80 (BP Location: Left arm, Patient Position: Sitting)   Pulse 92   Temp 97.5 °F (36.4 °C) (Temporal)   Resp 20   Ht 5' 10" (1.778 m)   Wt 111.4 kg (245 lb 9.6 oz)   SpO2 96%   BMI 35.24 kg/m²   Body mass index is 35.24 kg/m².      Physical Exam:    General: no acute distress, awake, alert  Eyes: PERRLA, conjunctiva clear, eyelids without swelling  HENT: atraumatic, oropharynx and nasal mucosa patent  Neck: supple, trache midline, full ROM, no JVD, no thyromegaly or lymphadenopathy  Respiratory: equal, unlabored, clear to auscultation A/P  Cardiovascular: RRR without murmur or rub;  Edema:  Trace BLE  Gastrointestinal: soft, non-tender, non-distended; positive bowel sounds; no masses to palpation; no ascites  Genitourinary: no CVA tenderness upon palpation  Musculoskeletal: ROM without new limitation or discomfort  Integumentary: warm, dry; no rashes, wounds, or skin " lesions  Neurological: oriented x4, appropriate, no acute deficits; no asterixis      Labs:        Component Value Date/Time     01/03/2025 1012     12/02/2024 1042    K 4.5 01/03/2025 1012    K 4.9 12/02/2024 1042     01/03/2025 1012     12/02/2024 1042    CO2 28 01/03/2025 1012    CO2 28 12/02/2024 1042    BUN 17.8 01/03/2025 1012    BUN 25.4 12/02/2024 1042    CREATININE 1.13 01/03/2025 1012    CREATININE 1.37 (H) 12/02/2024 1042    CREATININE 1.52 (H) 03/12/2024 0923    CREATININE 1.04 08/28/2023 0836    CALCIUM 9.4 01/03/2025 1012    CALCIUM 9.8 12/02/2024 1042    PHOS 3.1 01/03/2025 1012    PHOS 2.9 07/03/2023 0349           Component Value Date/Time    WBC 8.44 01/03/2025 1012    WBC 10.36 12/02/2024 1042    HGB 15.6 01/03/2025 1012    HGB 15.5 12/02/2024 1042    HCT 46.9 01/03/2025 1012    HCT 46.1 12/02/2024 1042     01/03/2025 1012     12/02/2024 1042         Imaging:  Retroperitoneal US:      Impression:    1. Chronic kidney disease, stage 3a    2. Type 2 diabetes mellitus with diabetic nephropathy, without long-term current use of insulin    3. Primary hypertension    4. Severe obesity (BMI 35.0-39.9) with comorbidity    5. Current smoker    6. Persistent proteinuria    7. History of Yesi's gangrene      CKD 2/3 due to diabetic nephropathy and nephrosclerosis.  Recent ZORAIDA likely in the setting of volume depletion hemodynamic effect of ACE.  U PCR 1000 mg/G  Maintained on ACE inhibitor.  Previously on SGLT2 inhibitor with Yesi's gangrene post resection.    DM2 uncontrolled with recent A1c 11.5.  Patient is also noncompliant with his diabetic medication regimen.    HTN-BP controlled.    History of Yesi's gangrene post resection now with complaints of dysuria.  He denies any open penile wounds or discharge.  He completed wound care and his surgery was 1 year ago.      Plan:  Renal ultrasound for next visit.   Urine culture this week.  Continue current  regimen.  Could consider titrating ACE inhibitor up as tolerated.  We will not use SGLT2 inhibitor due to patient's history of Yesi's gangrene.    Smoking cessation  Alcohol cessation  Adequate fluid intake.   Stressed importance of glycemic control, weight loss, adequate hydration, in slowing CKD progression.   Avoid NSAIDs (Aleve, Mobic, Celebrex, Ibuprofen, Advil, Toradol and Diclofenac).  Only take tylenol occasionally if needed for aches/pains.      Follow up in  1 month.   Patient to call our office with any concerns prior to next appointment.    Educated on low sodium diet:  Avoid high salt foods (olives, pickles, smoked meats, deli meats, salted potato chips, etc.).   Do not add salt to your food at the table.   Use only small amounts of salt when cooking.      Georgina Cai [facility]        This note was created with the assistance of Squir[employer] voice recognition software or phone dictation. There may be transcription errors as a result of using this technology however minimal. Effort has been made to assure accuracy of transcription but any obvious errors or omissions should be clarified with the author of the document.

## 2025-01-13 ENCOUNTER — OUTPATIENT CASE MANAGEMENT (OUTPATIENT)
Dept: ADMINISTRATIVE | Facility: OTHER | Age: 70
End: 2025-01-13
Payer: MEDICARE

## 2025-01-14 ENCOUNTER — CLINICAL SUPPORT (OUTPATIENT)
Dept: AUDIOLOGY | Facility: HOSPITAL | Age: 70
End: 2025-01-14
Payer: MEDICARE

## 2025-01-14 DIAGNOSIS — H91.90 HEARING LOSS, UNSPECIFIED HEARING LOSS TYPE, UNSPECIFIED LATERALITY: ICD-10-CM

## 2025-01-14 NOTE — PROGRESS NOTES
Mr. Addison is in for a hearing evaluation reporting a subjective decrease in hearing in his left ear, onset a few months ago. He has a known bilateral mixed hearing loss, last hearing evaluation obtained over 2 years ago. Otoscopy revealed cerumen occluded EAC's, AU.  Will continue with scheduled ENT appointment on 2/26/25 which cerumen removal should be done at that time.  Will follow up with audio once EAC's are clear.    Katie Vargas, AuD

## 2025-01-23 ENCOUNTER — TELEPHONE (OUTPATIENT)
Dept: ADMINISTRATIVE | Facility: CLINIC | Age: 70
End: 2025-01-23
Payer: MEDICARE

## 2025-01-24 DIAGNOSIS — Z79.4 TYPE 2 DIABETES MELLITUS WITH HYPERGLYCEMIA, WITH LONG-TERM CURRENT USE OF INSULIN: ICD-10-CM

## 2025-01-24 DIAGNOSIS — E11.40 TYPE 2 DIABETES MELLITUS WITH DIABETIC NEUROPATHY, WITHOUT LONG-TERM CURRENT USE OF INSULIN: ICD-10-CM

## 2025-01-24 DIAGNOSIS — E11.65 TYPE 2 DIABETES MELLITUS WITH HYPERGLYCEMIA, WITH LONG-TERM CURRENT USE OF INSULIN: ICD-10-CM

## 2025-01-27 ENCOUNTER — EXTERNAL HOME HEALTH (OUTPATIENT)
Dept: HOME HEALTH SERVICES | Facility: HOSPITAL | Age: 70
End: 2025-01-27
Payer: MEDICARE

## 2025-01-27 ENCOUNTER — TELEPHONE (OUTPATIENT)
Dept: INTERNAL MEDICINE | Facility: CLINIC | Age: 70
End: 2025-01-27
Payer: MEDICARE

## 2025-01-27 RX ORDER — INSULIN DEGLUDEC 200 U/ML
INJECTION, SOLUTION SUBCUTANEOUS
Qty: 9 ML | Refills: 3 | Status: SHIPPED | OUTPATIENT
Start: 2025-01-27

## 2025-01-27 RX ORDER — PEN NEEDLE, DIABETIC 31 GX5/16"
NEEDLE, DISPOSABLE MISCELLANEOUS
Qty: 100 EACH | Refills: 3 | Status: SHIPPED | OUTPATIENT
Start: 2025-01-27

## 2025-01-27 NOTE — TELEPHONE ENCOUNTER
----- Message from Geetha sent at 1/24/2025  2:02 PM CST -----  Regarding: med refill           Preferred Pharmacy: [facility]    Last Visit:12/04/24  Next Visit: 02/26/25         1. Name of Medication: Tresiba               2. Name of Medication: Needles

## 2025-01-29 ENCOUNTER — OUTPATIENT CASE MANAGEMENT (OUTPATIENT)
Dept: ADMINISTRATIVE | Facility: OTHER | Age: 70
End: 2025-01-29
Payer: MEDICARE

## 2025-01-30 ENCOUNTER — TELEPHONE (OUTPATIENT)
Dept: INTERNAL MEDICINE | Facility: CLINIC | Age: 70
End: 2025-01-30
Payer: MEDICARE

## 2025-01-30 NOTE — TELEPHONE ENCOUNTER
Raymon Addison STAFF; Linette Gilliam  REASON FOR CONFERENCE  ___X____RECERT/DC DECISION        THE TEAM REVIEWED THE EPISODE DETAIL REPORT & [facility] AND DISCUSSED THE FOLLOWIN% GOALS MET.  SKILLED NURSING ASSISTING PATIENT WITH PREFILLING MEDIPLANNER. DAUGHTER ADMINISTERS EVENING DOSAGE OF INSULIN HOWEVER NURSE HAS REPORTED TO MD THAT SHE DOES NOT ALWAYS SHOW UP FOR INJECTION PER PATIENT.  PATIENT SAW RADHA VALENZUELA NP ON 25(NEPHROLOGY) AND NOTE RECEIVED STATING THAT PATIENT RECENT HGBA1C 11.5.  SKILLED NURSING NEEDED FOR CONTINUED EDUCATION RELATED TO DISEASE PROCESS OF DM DUE TO GOALS NOT MET AND EXPRESSED KNOWLEDGE DEFICITS.    BASED ON DISCUSSION THE RECOMMENDATION OF THE TEAM IS                                                                                                                                   _X__RECERTIFICATION     ANTICIPATED DISCIPLINES RECERTING: _X_SN,    THE FOLLOWING WERE IN ATTENDANCE (DOCUMENT THE NAMES OF THOSE WHO ATTENDED AND THEIR TITLE:  WALT SCHWARTZ CD, LPN  LI FONTENOT, LPN  MARLYN MARC, WALT BERNAL, WALT BROCK, WALT PCM  MARTHA KRUGER, LPN PCMA  RAD WALLACE, WALT PCM  CHANTELLE RIVAS, LPN PCMA  GAYLE VIGIL, OT  HAIM FONG, PT  JUAN CHAND, GRAYSONSANDRA POND, WALT ANDRADE, PT  TALIA FAGAN, WALT MOORE, WALT MORSE, PTA  LOURDES ROWLAND, OT  SERJIO ROWLAND, PT  STEPHANIE TRACY, WALT WICK, WALT QUINTANILLA, LPN  IRVING TAYLOR, WALT TONG LP  SADIE AC N  KASHMIR NGUYEN RN, ED  RAYMON ADDISON, RN, PCM

## 2025-01-30 NOTE — TELEPHONE ENCOUNTER
Raymon Addison STAFF; Linette Gilliam  REASON FOR CONFERENCE  ___X____RECERT/DC DECISION        THE TEAM REVIEWED THE EPISODE DETAIL REPORT & [facility] AND DISCUSSED THE FOLLOWIN% GOALS MET.  SKILLED NURSING ASSISTING PATIENT WITH PREFILLING MEDIPLANNER. DAUGHTER ADMINISTERS EVENING DOSAGE OF INSULIN HOWEVER NURSE HAS REPORTED TO MD THAT SHE DOES NOT ALWAYS SHOW UP FOR INJECTION PER PATIENT.  PATIENT SAW RADHA VALENZUELA NP ON 25(NEPHROLOGY) AND NOTE RECEIVED STATING THAT PATIENT RECENT HGBA1C 11.5.  SKILLED NURSING NEEDED FOR CONTINUED EDUCATION RELATED TO DISEASE PROCESS OF DM DUE TO GOALS NOT MET AND EXPRESSED KNOWLEDGE DEFICITS.    BASED ON DISCUSSION THE RECOMMENDATION OF THE TEAM IS                                                                                                                                   _X__RECERTIFICATION     ANTICIPATED DISCIPLINES RECERTING: _X_SN,    THE FOLLOWING WERE IN ATTENDANCE (DOCUMENT THE NAMES OF THOSE WHO ATTENDED AND THEIR TITLE:  WALT SCHWARTZ CD, LPN  LI FONTENOT, LPN  MARLYN MARC, WALT BERNAL, WALT BROCK, WALT PCM  MARTHA KRUGER, LPN PCMA  RAD WALLACE, WALT PCM  CHANTELLE RIVAS, LPN PCMA  GAYLE VIGIL, OT  HAIM FONG, PT  JUAN CHAND, GRAYSONSANDRA POND, WALT ANDRADE, PT  TALIA FAGAN, WALT MOORE, WALT MORSE, PTA  LOURDES ROWLAND, OT  SERJIO ROWLAND, PT  STEPHANIE TRACY, WALT WICK, WALT QUINTANILLA, LPN  IRVING TAYLOR, WALT TONG LP  SADIE AC N  KASHMIR NGUYEN RN, ED  RAYMON ADDISON, RN, PCM

## 2025-01-31 ENCOUNTER — TELEPHONE (OUTPATIENT)
Dept: INTERNAL MEDICINE | Facility: CLINIC | Age: 70
End: 2025-01-31
Payer: MEDICARE

## 2025-01-31 NOTE — TELEPHONE ENCOUNTER
Team Case conference note--FYI  Received: Yesterday  Raymon Addison STAFF; Linette Gilliam  REASON FOR CONFERENCE  ___X____RECERT/DC DECISION        THE TEAM REVIEWED THE EPISODE DETAIL REPORT & [facility] AND DISCUSSED THE FOLLOWIN% GOALS MET.  SKILLED NURSING ASSISTING PATIENT WITH PREFILLING MEDIPLANNER. DAUGHTER ADMINISTERS EVENING DOSAGE OF INSULIN HOWEVER NURSE HAS REPORTED TO MD THAT SHE DOES NOT ALWAYS SHOW UP FOR INJECTION PER PATIENT.  PATIENT SAW RADHA VALENZUELA NP ON 25(NEPHROLOGY) AND NOTE RECEIVED STATING THAT PATIENT RECENT HGBA1C 11.5.  SKILLED NURSING NEEDED FOR CONTINUED EDUCATION RELATED TO DISEASE PROCESS OF DM DUE TO GOALS NOT MET AND EXPRESSED KNOWLEDGE DEFICITS.    BASED ON DISCUSSION THE RECOMMENDATION OF THE TEAM IS                                                                                                                                   _X__RECERTIFICATION     ANTICIPATED DISCIPLINES RECERTING: _X_SN,    THE FOLLOWING WERE IN ATTENDANCE (DOCUMENT THE NAMES OF THOSE WHO ATTENDED AND THEIR TITLE:  WALT SCHWARTZ CD, LPN  LI FONTENOT, LPN  WALT PEREZ, WALT BROCK, WALT PCM  MARTHA KRUGER, N PCMA  RAD WALLACE, WALT PCM  CHANTELLE RIVAS, LPN PCMA  GAYLE VIGIL, OT  HAIM FONG, PT  JUAN CHAND, GRAYSONSANDRA POND, WALT ANDRADE, PT  TALIA FAGAN, WALT MOORE, WALT MORSE, PTA  LOURDES ROWLAND, OT  SERJIO ROWLAND, PT  STEPHANIE TRACY, WALT WICK, WALT QUINTANILLA, N  IRVING TAYLOR, WALT TONG [facility]  SADEI AC [facility]  KASHMIR NGUYEN RN, ED  RAYMON ADDISON, WALT, PCM

## 2025-02-21 NOTE — TELEPHONE ENCOUNTER
----- Message from Cammy Christianson NP sent at 12/2/2024 12:43 PM CST -----  Please call patient and let him know blood sugar was 343 on blood work. Recommend he take medications if he has not already done so and encourage compliance with ADA diet. If any associated symptoms of n/v, abdominal pain, palpitations, confusion/ disorientation, weakness, fatigue- report to ER please.   [Negative] : Allergic/Immunologic

## 2025-02-25 NOTE — PROGRESS NOTES
[facility]  Otolaryngology Clinic Note    John Addison  YOB: 1955    Chief Complaint:   Chief Complaint   Patient presents with    referral: Hearing Loss        HPI:       02/26/2025: 70 y.o. male referred for back for HL. Needs cerumen removal prior to audio. Endorses trouble hearing L > R. Reports he was seen in the past and told he needed a tube in his ear, but he did/does not desire that. C/o frequent nasal congestion, rhinitis, and epiphora. States he infrequently uses flonase for this.    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    hyperlipidemia     Hypertension        Past Surgical History:   Procedure Laterality Date    CORONARY ANGIOPLASTY      EXCISION, LESION, LOWER EXTREMITY Left 5/25/2023    Procedure: EXCISION, LESION, LOWER EXTREMITY;  Surgeon: South Terry MD;  Location: [facility];  Service: General;  Laterality: Left;    TONSILLECTOMY  07/2018    WOUND DEBRIDEMENT N/A 5/10/2023    Procedure: DEBRIDEMENT, WOUND;  Surgeon: Smith Lay MD;  Location: [facility];  Service: General;  Laterality: N/A;  perineum debridement, lithotomy           Family History   Problem Relation Name Age of Onset    Hypertension Mother      Heart failure Mother      Heart attack Mother      Coronary artery disease Mother      Cancer Father      Cancer Sister             Physical Exam:  Vitals:    02/26/25 1040   BP: 109/70   BP Location: Right arm   Patient Position: Sitting   Pulse: 105   Temp: 97.7 °F (36.5 °C)   TempSrc: Oral       Physical Exam   General: NAD, voice normal  Neuro: AAO, CN II - XII grossly  intact  Head/ Face: NCAT, symmetric, sensations intact bilaterally  Eyes: EOMI, PERRL  Ears: externally normal with grossly normal hearing  AD: EAC narrow with cerumen impaction desquamated skin of canal- atraumatic removal under microscopy with suction- TM intact, no middle ear effusion, no retractions  AS: EAC narrow with cerumen impaction desquamated skin of canal- atraumatic (partial) removal under microscopy with suction  Nose: bilateral nares patent, midline septum, no rhinorrhea, no external deformity, no turbinate hypertrophy  OC/OP: MMM, no intraoral lesions, FOM/BOT soft, no trismus, dentition is moderate, no uvular deviation, bilaterally symmetric soft palate elevation, palatoglossus and palatopharyngeal fold wnl; tonsils are symmetric and 3+  Indirect laryngoscopy: deferred due to patient intolerance  Neck: soft, supple, no LAD, normal ROM, no thyromegaly  Respiratory: nonlabored, no wheezing, bilateral chest rise  Cardiovascular: RRR  Gastrointestinal: S NT ND  Skin: warm, no lesions  Musculoskeletal: 5/5 strength  Psych: Appropriate affect/mood     Pertinent Data:  ? LABS:  ? AUDIO:             ? PATH:      Imaging:   I personally reviewed the following images:        Assessment/Plan:  70 y.o. male with PMH asymmetric tonsils (bx 2018 with benign path), and asymmetric MHL. CT IAC 6/2018 with b/l thin vs dehiscent tegmen, sharp scutum. MRI 1/2019 with no evidence of encephalocele. Right cerumen removal today but unable to completely clean left canal.   - Debrox to left ear BID  - Will plan for audio once cerumen removed  - RTC 2-3mo    Jeanette Wells NP  >60min spent on visit

## 2025-02-26 ENCOUNTER — OFFICE VISIT (OUTPATIENT)
Dept: OTOLARYNGOLOGY | Facility: CLINIC | Age: 70
End: 2025-02-26
Payer: MEDICARE

## 2025-02-26 ENCOUNTER — OFFICE VISIT (OUTPATIENT)
Dept: INTERNAL MEDICINE | Facility: CLINIC | Age: 70
End: 2025-02-26
Payer: MEDICARE

## 2025-02-26 VITALS
DIASTOLIC BLOOD PRESSURE: 78 MMHG | OXYGEN SATURATION: 98 % | HEART RATE: 100 BPM | RESPIRATION RATE: 18 BRPM | HEIGHT: 70 IN | SYSTOLIC BLOOD PRESSURE: 142 MMHG | TEMPERATURE: 98 F | WEIGHT: 245.38 LBS | BODY MASS INDEX: 35.13 KG/M2

## 2025-02-26 VITALS — DIASTOLIC BLOOD PRESSURE: 70 MMHG | TEMPERATURE: 98 F | HEART RATE: 105 BPM | SYSTOLIC BLOOD PRESSURE: 109 MMHG

## 2025-02-26 DIAGNOSIS — E78.5 HYPERLIPIDEMIA LDL GOAL <70: ICD-10-CM

## 2025-02-26 DIAGNOSIS — I73.9 PERIPHERAL ARTERY DISEASE: ICD-10-CM

## 2025-02-26 DIAGNOSIS — I25.10 CORONARY ARTERY DISEASE, UNSPECIFIED VESSEL OR LESION TYPE, UNSPECIFIED WHETHER ANGINA PRESENT, UNSPECIFIED WHETHER NATIVE OR TRANSPLANTED HEART: ICD-10-CM

## 2025-02-26 DIAGNOSIS — E11.65 TYPE 2 DIABETES MELLITUS WITH HYPERGLYCEMIA, WITH LONG-TERM CURRENT USE OF INSULIN: ICD-10-CM

## 2025-02-26 DIAGNOSIS — Z79.4 TYPE 2 DIABETES MELLITUS WITHOUT COMPLICATION, WITH LONG-TERM CURRENT USE OF INSULIN: ICD-10-CM

## 2025-02-26 DIAGNOSIS — E66.01 SEVERE OBESITY (BMI 35.0-39.9) WITH COMORBIDITY: ICD-10-CM

## 2025-02-26 DIAGNOSIS — E11.9 TYPE 2 DIABETES MELLITUS WITHOUT COMPLICATION, WITH LONG-TERM CURRENT USE OF INSULIN: ICD-10-CM

## 2025-02-26 DIAGNOSIS — H91.90 HEARING LOSS, UNSPECIFIED HEARING LOSS TYPE, UNSPECIFIED LATERALITY: Primary | ICD-10-CM

## 2025-02-26 DIAGNOSIS — N18.31 CHRONIC KIDNEY DISEASE, STAGE 3A: ICD-10-CM

## 2025-02-26 DIAGNOSIS — I10 PRIMARY HYPERTENSION: ICD-10-CM

## 2025-02-26 DIAGNOSIS — T30.0 BURN: Primary | ICD-10-CM

## 2025-02-26 DIAGNOSIS — J44.9 CHRONIC OBSTRUCTIVE PULMONARY DISEASE, UNSPECIFIED COPD TYPE: ICD-10-CM

## 2025-02-26 DIAGNOSIS — L60.3 DYSTROPHIC NAIL: ICD-10-CM

## 2025-02-26 DIAGNOSIS — B35.1 ONYCHOMYCOSIS OF MULTIPLE TOENAILS WITH TYPE 2 DIABETES MELLITUS: ICD-10-CM

## 2025-02-26 DIAGNOSIS — E11.69 ONYCHOMYCOSIS OF MULTIPLE TOENAILS WITH TYPE 2 DIABETES MELLITUS: ICD-10-CM

## 2025-02-26 DIAGNOSIS — F17.219 CIGARETTE NICOTINE DEPENDENCE WITH NICOTINE-INDUCED DISORDER: ICD-10-CM

## 2025-02-26 DIAGNOSIS — Z79.4 TYPE 2 DIABETES MELLITUS WITH HYPERGLYCEMIA, WITH LONG-TERM CURRENT USE OF INSULIN: ICD-10-CM

## 2025-02-26 DIAGNOSIS — Z91.199 MEDICAL NON-COMPLIANCE: ICD-10-CM

## 2025-02-26 PROCEDURE — 99214 OFFICE O/P EST MOD 30 MIN: CPT | Mod: PBBFAC,27 | Performed by: NURSE PRACTITIONER

## 2025-02-26 PROCEDURE — 99215 OFFICE O/P EST HI 40 MIN: CPT | Mod: PBBFAC | Performed by: NURSE PRACTITIONER

## 2025-02-26 PROCEDURE — 69210 REMOVE IMPACTED EAR WAX UNI: CPT | Mod: 50,PBBFAC | Performed by: NURSE PRACTITIONER

## 2025-02-26 PROCEDURE — 99214 OFFICE O/P EST MOD 30 MIN: CPT | Mod: S$PBB,,, | Performed by: NURSE PRACTITIONER

## 2025-02-26 RX ORDER — MUPIROCIN 20 MG/G
OINTMENT TOPICAL 2 TIMES DAILY
Qty: 30 G | Refills: 0 | Status: SHIPPED | OUTPATIENT
Start: 2025-02-26

## 2025-02-26 NOTE — ASSESSMENT & PLAN NOTE
Patient non compliant with CBG checks and administration of insulin. He misses many appointments and unfortunately relies on transportation services which is likely contributing to no shows for visits/ testing,etc. Encouraged him to ask if daughter can attend visits to assist in his care
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
## 2025-02-26 NOTE — ASSESSMENT & PLAN NOTE
Lab Results   Component Value Date    HGBA1C 11.6 (H) 12/02/2024     CBGs:  Hypoglycemia episodes:  Microalbumin:   Lab Results   Component Value Date    MICALBCREAT 538.4 (H) 12/02/2024     Educated on ADA diet: eliminate/decrease high carbohydrate foods (rice, pasta, bread, potatoes (french fries, chips), candy, sweets, fruit juices, canned fruit, junk food, crackers, carbonated beverages)  Educated on health benefits of at least 5 days/ week of 30 minutes moderate intensity exercise (brisk walking) and 2 or more days/ week of muscle strength activities  Avoid alcohol or tobacco use  Eye exam: fundus photos ordered  Foot exam: 8/27/24  Per recommendations, continue with ACEI/ARB, Daily ASA 81 mg for CV prevention, Statin therapy  Increase Glipizide to 1 tablet BID and Trulicity to 4.5 mg once weekly  HH referral to assist for diet education, cbg checks and compliance with medications  Pt refuses to check cbgs and insulin to self  Not checking CBGs. HH nurse has not been x 2 weeks per pt   Recheck labs with f/u visit ; encouraged compliance

## 2025-02-26 NOTE — PATIENT INSTRUCTIONS
Please reschedule missed appointments   Please contact Nephrology, Wound Care, Vascular, Ophthalmology clinics    Please call to reschedule vascular ultrasound of your legs; scheduling department [phone]

## 2025-02-26 NOTE — ASSESSMENT & PLAN NOTE
Reminded pt to avoid trimming nails on his own and referral to wound care previously ordered    Acuity of illness

## 2025-02-26 NOTE — ASSESSMENT & PLAN NOTE
BP Readings from Last 3 Encounters:   02/26/25 109/70   02/26/25 (!) 142/78   01/06/25 110/80   Asymptomatic 142/78- did not take medications yet   Follow low sodium diet, < 2 gm/day (avoid high salty foods such as processed meats/ sausage/hurtado/ sandwich meat, chips, pickles, cheese, crackers and soft drinks/ electrolyte replacement drinks).  Avoid tobacco/ alcohol use  Educated on health benefits of at least 5 days/ week of 30 minutes moderate intensity exercise (brisk walking) and 2 or more days/ week of muscle strength activities  Daily ASA 81 mg for CV prevention  Continue current medication regimen

## 2025-02-27 ENCOUNTER — TELEPHONE (OUTPATIENT)
Dept: ADMINISTRATIVE | Facility: CLINIC | Age: 70
End: 2025-02-27
Payer: MEDICARE

## 2025-03-20 ENCOUNTER — TELEPHONE (OUTPATIENT)
Dept: ADMINISTRATIVE | Facility: CLINIC | Age: 70
End: 2025-03-20
Payer: MEDICARE

## 2025-03-24 ENCOUNTER — EXTERNAL HOME HEALTH (OUTPATIENT)
Dept: HOME HEALTH SERVICES | Facility: HOSPITAL | Age: 70
End: 2025-03-24
Payer: MEDICARE

## 2025-04-03 ENCOUNTER — TELEPHONE (OUTPATIENT)
Dept: ADMINISTRATIVE | Facility: CLINIC | Age: 70
End: 2025-04-03
Payer: MEDICARE

## 2025-04-09 ENCOUNTER — OFFICE VISIT (OUTPATIENT)
Dept: INTERNAL MEDICINE | Facility: CLINIC | Age: 70
End: 2025-04-09
Payer: MEDICARE

## 2025-04-09 VITALS
OXYGEN SATURATION: 96 % | WEIGHT: 253.88 LBS | TEMPERATURE: 98 F | HEART RATE: 77 BPM | BODY MASS INDEX: 36.35 KG/M2 | DIASTOLIC BLOOD PRESSURE: 73 MMHG | RESPIRATION RATE: 18 BRPM | SYSTOLIC BLOOD PRESSURE: 136 MMHG | HEIGHT: 70 IN

## 2025-04-09 DIAGNOSIS — Z79.4 TYPE 2 DIABETES MELLITUS WITH HYPERGLYCEMIA, WITH LONG-TERM CURRENT USE OF INSULIN: Primary | ICD-10-CM

## 2025-04-09 DIAGNOSIS — D22.9 NEVUS: ICD-10-CM

## 2025-04-09 DIAGNOSIS — E11.40 TYPE 2 DIABETES MELLITUS WITH DIABETIC NEUROPATHY, WITH LONG-TERM CURRENT USE OF INSULIN: ICD-10-CM

## 2025-04-09 DIAGNOSIS — E11.65 TYPE 2 DIABETES MELLITUS WITH HYPERGLYCEMIA, WITH LONG-TERM CURRENT USE OF INSULIN: Primary | ICD-10-CM

## 2025-04-09 DIAGNOSIS — E66.01 SEVERE OBESITY (BMI 35.0-39.9) WITH COMORBIDITY: ICD-10-CM

## 2025-04-09 DIAGNOSIS — I10 PRIMARY HYPERTENSION: ICD-10-CM

## 2025-04-09 DIAGNOSIS — Z79.4 TYPE 2 DIABETES MELLITUS WITH DIABETIC NEUROPATHY, WITH LONG-TERM CURRENT USE OF INSULIN: ICD-10-CM

## 2025-04-09 LAB — HBA1C MFR BLD: 12.6 %

## 2025-04-09 PROCEDURE — 99215 OFFICE O/P EST HI 40 MIN: CPT | Mod: PBBFAC | Performed by: NURSE PRACTITIONER

## 2025-04-09 PROCEDURE — 83036 HEMOGLOBIN GLYCOSYLATED A1C: CPT | Mod: PBBFAC | Performed by: NURSE PRACTITIONER

## 2025-04-09 PROCEDURE — G2211 COMPLEX E/M VISIT ADD ON: HCPCS | Mod: S$PBB,,, | Performed by: NURSE PRACTITIONER

## 2025-04-09 PROCEDURE — 99214 OFFICE O/P EST MOD 30 MIN: CPT | Mod: S$PBB,,, | Performed by: NURSE PRACTITIONER

## 2025-04-09 RX ORDER — METOPROLOL TARTRATE 50 MG/1
50 TABLET ORAL 2 TIMES DAILY
Qty: 180 TABLET | Refills: 3 | Status: SHIPPED | OUTPATIENT
Start: 2025-04-09 | End: 2026-04-09

## 2025-04-09 RX ORDER — FLASH GLUCOSE SENSOR
KIT MISCELLANEOUS
Qty: 2 KIT | Refills: 6 | Status: SHIPPED | OUTPATIENT
Start: 2025-04-09

## 2025-04-09 RX ORDER — FLASH GLUCOSE SCANNING READER
EACH MISCELLANEOUS
Qty: 2 EACH | Refills: 6 | Status: SHIPPED | OUTPATIENT
Start: 2025-04-09

## 2025-04-09 RX ORDER — INSULIN DEGLUDEC 200 U/ML
64 INJECTION, SOLUTION SUBCUTANEOUS NIGHTLY
Qty: 10 ML | Refills: 3 | Status: SHIPPED | OUTPATIENT
Start: 2025-04-09

## 2025-04-09 RX ORDER — TIRZEPATIDE 2.5 MG/.5ML
2.5 INJECTION, SOLUTION SUBCUTANEOUS
Qty: 4 PEN | Refills: 1 | Status: SHIPPED | OUTPATIENT
Start: 2025-04-09

## 2025-04-09 NOTE — ASSESSMENT & PLAN NOTE
BMI 36.43  See HTN, DM, HLD- needs better control of chronic disease processes, dietary modifications and compliance with medications / lifestyle modifications   Educated on increased risk of disease s/t obesity.  Educated on health benefits of at least 5 days/ week of 30 minutes moderate intensity exercise (brisk walking) and 2 or more days/ week of muscle strength activities (as tolerated).  Eat well balanced diet of fresh fruits/ vegetables, whole grains, lean meats and limit high carbohydrate foods.

## 2025-04-09 NOTE — ASSESSMENT & PLAN NOTE
BP Readings from Last 3 Encounters:   04/09/25 136/73   02/26/25 109/70   02/26/25 (!) 142/78     Follow low sodium diet, < 2 gm/day (avoid high salty foods such as processed meats/ sausage/hurtado/ sandwich meat, chips, pickles, cheese, crackers and soft drinks/ electrolyte replacement drinks).  Avoid tobacco/ alcohol use  Educated on health benefits of at least 5 days/ week of 30 minutes moderate intensity exercise (brisk walking) and 2 or more days/ week of muscle strength activities  Daily ASA 81 mg for CV prevention  Continue current medication regimen

## 2025-04-09 NOTE — ASSESSMENT & PLAN NOTE
>>ASSESSMENT AND PLAN FOR DIABETES MELLITUS WRITTEN ON 2/26/2025  2:39 PM BY LEONARD BERNAL NP    Lab Results   Component Value Date    HGBA1C 11.6 (H) 12/02/2024     Lab Results   Component Value Date    IHLEWDR7U 12.6 04/09/2025     A1c worsened.   Pt remains non compliant with dietary/ lifestyle modifications.   Reports compliance with medication  - daughter administers injections  Pt does not check CBGs. HH recently discharged based on non compliance.   Will try for approval for CGM   CBGs: unknown   Hypoglycemia episodes: denies symptoms ; unknown- does not check  Microalbumin:   Lab Results   Component Value Date    MICALBCREAT 538.4 (H) 12/02/2024     Educated on ADA diet: eliminate/decrease high carbohydrate foods (rice, pasta, bread, potatoes (french fries, chips), candy, sweets, fruit juices, canned fruit, junk food, crackers, carbonated beverages)  Educated on health benefits of at least 5 days/ week of 30 minutes moderate intensity exercise (brisk walking) and 2 or more days/ week of muscle strength activities  Avoid alcohol or tobacco use  Eye exam: referred to [facility] eye clinic  Foot exam: 8/27/24  Per recommendations, continue with ACEI/ARB, Daily ASA 81 mg for CV prevention, Statin therapy  Pt refuses to check cbgs and insulin to self  DC Trulicity as pt is on max dosage with continued uncontrolled A1c   Rx Mounjaro 2.5 mg SC q7d and CGM   Referral back to Endo   F/u 4 weeks

## 2025-04-09 NOTE — PATIENT INSTRUCTIONS
Stop Trulicity   New medication sent to replace Trulicity. You can start taking Mounjaro 2.5 mg SC every 7 days instead.     You are being referred to Family [facility] for examination of mole to your neck.

## 2025-04-09 NOTE — ASSESSMENT & PLAN NOTE
Pt reports noticing over the last few days a bump to back side of left neck. Scratched it and did bleed. Unsure how long has been present. Using mupirocin.   See image. Referral to FM and due to history of no show/ no answer for phone calls encouraged and reminded him to please have family member answer phone to get scheduled.

## 2025-04-09 NOTE — PROGRESS NOTES
Cammy L Sammy, NP   [facility]  [address] [facility] [address]      PATIENT NAME: John Addison  : 1955  DATE: 25  MRN: [id number]        History of Present Illness / Problem Focused Workflow     John Addison presents to the clinic with Diabetes (Did not do labs)     71 yo male unaccompanied for 4 wk follow up for HTN and DM with lab review (labs not completed) Last seen 25. PMH includes CAD/NSTEMI (2016) s/p PCI of left circumflex, inferior wall STEMI s/p PTCA/BHAKTI of distal RCA 2017, yasmin gangrene of scrotum, alcohol abuse. Active diagnosis include HTN, HLD, PAD, COPD, pulmonary nodule, type 2 DM, elevated LFTs, BPH, hearing loss, abd lymphadenopathy, onychomycosis, tobacco abuse, obesity and medical non compliance. He presents unaccompanied. He states his daughter's friend brought him for visit today. Labs not completed. Admits ate large meal this morning. POC A1c remains elevated and worsened at 12.6%. He admits to non compliance with diet but states compliance with medications. /73. He is c/o left neck hard bump. Using mupirocin on it. Wants it looked at. Denies any CP, SOB, abd pain.     Notified by home health - discharged due to non compliance.   Was referred to [facility] wound care 25- referral status - unable to contact.    Other providers   [facility] Ophthalmology - referred 2024  [facility] Wound Care- missed appointments  [facility] Vascular- multiple missed appointments   [facility] Cardiology 9/10/24  [facility] GI   [facility] Pulmonology- last visit 21  [facility] ENT - 25  Dr. Romero/ Dr. Juarez Podiatry- unable to confirm pt received appts ?   [facility] Endo- former pt; re-referred 25  Former: Dr. Smith Lay- General Surgeon  Renal     He missed nephrology appointment 25  He had vascular appointment 10/1/24 and 10/29/24-  missed.   He had wound care appointment 10/29/24- missed.   He was scheduled for vascular US  10/23/24- missed.    Review of Systems     Review of Systems   Constitutional: Negative.    HENT: Negative.     Eyes: Negative.    Respiratory: Negative.     Cardiovascular: Negative.    Gastrointestinal: Negative.    Endocrine: Negative.    Genitourinary: Negative.    Musculoskeletal: Negative.    Skin:  Positive for rash (bump to neck).   Allergic/Immunologic: Negative.    Neurological: Negative.    Hematological: Negative.    Psychiatric/Behavioral: Negative.         Medical / Social / Family History     -------------------------------------    Abscess    Abscess of groin, left    COPD (chronic obstructive pulmonary disease)    Coronary artery disease    Diabetes mellitus    Yesi's gangrene    Yesi's gangrene of scrotum    hyperlipidemia    Hypertension        Past Surgical History:   Procedure Laterality Date    CORONARY ANGIOPLASTY      EXCISION, LESION, LOWER EXTREMITY Left 5/25/2023    Procedure: EXCISION, LESION, LOWER EXTREMITY;  Surgeon: South Terry MD;  Location: [facility] OR;  Service: General;  Laterality: Left;    TONSILLECTOMY  07/2018    WOUND DEBRIDEMENT N/A 5/10/2023    Procedure: DEBRIDEMENT, WOUND;  Surgeon: Smith Lay MD;  Location: [facility];  Service: General;  Laterality: N/A;  perineum debridement, lithotomy       Social History[1]     Family History   Problem Relation Name Age of Onset    Hypertension Mother      Heart failure Mother      Heart attack Mother      Coronary artery disease Mother      Cancer Father      Cancer Sister          Medications and Allergies     Medications  Current Outpatient Medications   Medication Instructions    albuterol (PROVENTIL/VENTOLIN HFA) 90 mcg/actuation inhaler 2 puffs, Inhalation, Every 6 hours PRN    aspirin 81 mg, Oral, Daily    atorvastatin (LIPITOR) 80 mg, Oral, Daily    blood sugar diagnostic Strp To check BG three times daily, to use with insurance preferred meter    blood-glucose meter kit To check BG three times daily, to use  "with insurance preferred meter    clopidogreL (PLAVIX) 75 mg, Oral, Daily    docusate sodium (COLACE) 100 mg, As needed (PRN)    famotidine (PEPCID) 20 mg, Oral, 2 times daily    ferrous sulfate 324 mg, Oral, Daily    finasteride (PROSCAR) 5 mg, Oral, Daily    flash glucose scanning reader (FREESTYLE JOHNNY 14 DAY READER) Misc Use daily for continuous glucose monitoring.    flash glucose sensor (FREESTYLE JOHNNY 14 DAY SENSOR) Kit Use daily for continuous glucose monitoring.    fluticasone-salmeterol diskus inhaler 250-50 mcg 1 puff, Inhalation, 2 times daily, Controller    folic acid (FOLVITE) 1 mg, Oral, Daily    gabapentin (NEURONTIN) 300 mg, Oral, 3 times daily PRN    glipiZIDE (GLUCOTROL) 10 mg, Oral, 2 times daily    insulin degludec (TRESIBA FLEXTOUCH U-200) 64 Units, Subcutaneous, Nightly    lancets Misc To check BG three times daily, to use with insurance preferred meter    lisinopriL 10 mg, Oral, Daily    metFORMIN (GLUCOPHAGE) 1,000 mg, Oral, 2 times daily with meals    metoprolol tartrate (LOPRESSOR) 50 mg, Oral, 2 times daily    MOUNJARO 2.5 mg, Subcutaneous, Every 7 days    mupirocin (BACTROBAN) 2 % ointment Topical (Top), 2 times daily    pen needle, diabetic (BD ULTRA-FINE GOOD PEN NEEDLE) 32 gauge x 5/32" Ndle USE 1 SYRINGE DAILY     sodium bicarbonate 650 mg, Oral, 2 times daily    tamsulosin (FLOMAX) 0.8 mg, Oral, Daily       Allergies  Review of patient's allergies indicates:  No Known Allergies    Physical Examination     Visit Vitals  /73 (BP Location: Right arm, Patient Position: Sitting)   Pulse 77   Temp 97.7 °F (36.5 °C) (Oral)   Resp 18   Ht 5' 10" (1.778 m)   Wt 115.2 kg (253 lb 14.4 oz)   SpO2 96%   BMI 36.43 kg/m²       Physical Exam  Constitutional:       General: He is not in acute distress.     Appearance: Normal appearance. He is obese. He is not ill-appearing, toxic-appearing or diaphoretic.   HENT:      Head: Normocephalic.   Cardiovascular:      Rate and Rhythm: Normal rate " and regular rhythm.   Pulmonary:      Effort: Pulmonary effort is normal. No respiratory distress.      Breath sounds: Normal breath sounds. No stridor. No wheezing, rhonchi or rales.   Skin:     General: Skin is warm and dry.      Findings: Lesion (left lateral neck with hyperpigmented papular lesion with central scab noted and ulcerated) present.   Neurological:      General: No focal deficit present.      Mental Status: He is alert and oriented to person, place, and time. Mental status is at baseline.      Motor: No weakness.      Coordination: Coordination normal.      Gait: Gait normal.   Psychiatric:         Mood and Affect: Mood normal.         Behavior: Behavior normal.         Thought Content: Thought content normal.         Judgment: Judgment normal.           Results     Lab Results   Component Value Date    WBC 8.44 01/03/2025    RBC 5.94 01/03/2025    HGB 15.6 01/03/2025    HCT 46.9 01/03/2025    MCV 79.0 (L) 01/03/2025    MCH 26.3 (L) 01/03/2025    MCHC 33.3 01/03/2025    RDW 18.9 (H) 01/03/2025     01/03/2025    MPV 10.5 (H) 01/03/2025     Sodium   Date Value Ref Range Status   01/03/2025 138 136 - 145 mmol/L Final     Potassium   Date Value Ref Range Status   01/03/2025 4.5 3.5 - 5.1 mmol/L Final     Chloride   Date Value Ref Range Status   01/03/2025 104 98 - 107 mmol/L Final     CO2   Date Value Ref Range Status   01/03/2025 28 23 - 31 mmol/L Final     Blood Urea Nitrogen   Date Value Ref Range Status   01/03/2025 17.8 8.4 - 25.7 mg/dL Final     Creatinine   Date Value Ref Range Status   01/03/2025 1.13 0.72 - 1.25 mg/dL Final     Calcium   Date Value Ref Range Status   01/03/2025 9.4 8.8 - 10.0 mg/dL Final     Albumin   Date Value Ref Range Status   01/03/2025 3.2 (L) 3.4 - 4.8 g/dL Final     Bilirubin Total   Date Value Ref Range Status   01/03/2025 0.6 <=1.5 mg/dL Final     ALP   Date Value Ref Range Status   01/03/2025 138 40 - 150 unit/L Final     AST   Date Value Ref Range Status    01/03/2025 32 5 - 34 unit/L Final     ALT   Date Value Ref Range Status   01/03/2025 38 0 - 55 unit/L Final     Estimated GFR-Non    Date Value Ref Range Status   06/26/2018 65 (L) >>=90 mL/min Final     Lab Results   Component Value Date    CHOL 135 12/02/2024     Lab Results   Component Value Date    HDL 35 12/02/2024     Lab Results   Component Value Date    TRIG 246 (H) 12/02/2024     Lab Results   Component Value Date    LDL 51.00 12/02/2024     Lab Results   Component Value Date    TSH 1.883 12/02/2024     Lab Results   Component Value Date    PHUR 6.0 01/03/2025    SPECGRAV 1.030 07/17/2023    PROTEINUA 2+ (A) 01/03/2025    GLUCUA 4+ (A) 01/03/2025    KETONESU neg 07/17/2023    OCCULTUA Trace (A) 01/03/2025    NITRITE 2+ (A) 01/03/2025    LEUKOCYTESUR 75 (A) 01/03/2025     Lab Results   Component Value Date    HGBA1C 11.6 (H) 12/02/2024    HGBA1C 13.9 (H) 03/12/2024    HGBA1C 9.4 (H) 08/28/2023     Lab Results   Component Value Date    MICALBCREAT 538.4 (H) 12/02/2024        Assessment       ICD-10-CM ICD-9-CM   1. Type 2 diabetes mellitus with hyperglycemia, with long-term current use of insulin  E11.65 250.00    Z79.4 790.29     V58.67   2. Primary hypertension  I10 401.9   3. Nevus  D22.9 216.9   4. Type 2 diabetes mellitus with diabetic neuropathy, with long-term current use of insulin  E11.40 250.60    Z79.4 357.2     V58.67   5. Severe obesity (BMI 35.0-39.9) with comorbidity  E66.01 278.01       Plan       Problem List Items Addressed This Visit          Derm    Nevus    Current Assessment & Plan   Pt reports noticing over the last few days a bump to back side of left neck. Scratched it and did bleed. Unsure how long has been present. Using mupirocin.   See image. Referral to FM and due to history of no show/ no answer for phone calls encouraged and reminded him to please have family member answer phone to get scheduled.          Relevant Orders    Ambulatory referral/consult to  Family Practice       Cardiac/Vascular    Hypertension    Current Assessment & Plan   BP Readings from Last 3 Encounters:   04/09/25 136/73   02/26/25 109/70   02/26/25 (!) 142/78     Follow low sodium diet, < 2 gm/day (avoid high salty foods such as processed meats/ sausage/hurtado/ sandwich meat, chips, pickles, cheese, crackers and soft drinks/ electrolyte replacement drinks).  Avoid tobacco/ alcohol use  Educated on health benefits of at least 5 days/ week of 30 minutes moderate intensity exercise (brisk walking) and 2 or more days/ week of muscle strength activities  Daily ASA 81 mg for CV prevention  Continue current medication regimen           Relevant Medications    metoprolol tartrate (LOPRESSOR) 50 MG tablet       Endocrine    Severe obesity (BMI 35.0-39.9) with comorbidity    Current Assessment & Plan   BMI 36.43  See HTN, DM, HLD- needs better control of chronic disease processes, dietary modifications and compliance with medications / lifestyle modifications   Educated on increased risk of disease s/t obesity.  Educated on health benefits of at least 5 days/ week of 30 minutes moderate intensity exercise (brisk walking) and 2 or more days/ week of muscle strength activities (as tolerated).  Eat well balanced diet of fresh fruits/ vegetables, whole grains, lean meats and limit high carbohydrate foods.            Type 2 diabetes mellitus with diabetic neuropathy, with long-term current use of insulin    Current Assessment & Plan   See DM with hyperglycemia         Relevant Medications    insulin degludec (TRESIBA FLEXTOUCH U-200) 200 unit/mL (3 mL) insulin pen    Type 2 diabetes mellitus with hyperglycemia, with long-term current use of insulin - Primary    Current Assessment & Plan    >>ASSESSMENT AND PLAN FOR DIABETES MELLITUS WRITTEN ON 2/26/2025  2:39 PM BY LEONARD BERNAL NP    Lab Results   Component Value Date    HGBA1C 11.6 (H) 12/02/2024     Lab Results   Component Value Date    YPELQMO1K 12.6  04/09/2025     A1c worsened.   Pt remains non compliant with dietary/ lifestyle modifications.   Reports compliance with medication  - daughter administers injections  Pt does not check CBGs. HH recently discharged based on non compliance.   Will try for approval for CGM   CBGs: unknown   Hypoglycemia episodes: denies symptoms ; unknown- does not check  Microalbumin:   Lab Results   Component Value Date    MICALBCREAT 538.4 (H) 12/02/2024     Educated on ADA diet: eliminate/decrease high carbohydrate foods (rice, pasta, bread, potatoes (french fries, chips), candy, sweets, fruit juices, canned fruit, junk food, crackers, carbonated beverages)  Educated on health benefits of at least 5 days/ week of 30 minutes moderate intensity exercise (brisk walking) and 2 or more days/ week of muscle strength activities  Avoid alcohol or tobacco use  Eye exam: referred to [facility]  Foot exam: 8/27/24  Per recommendations, continue with ACEI/ARB, Daily ASA 81 mg for CV prevention, Statin therapy  Pt refuses to check cbgs and insulin to self  DC Trulicity as pt is on max dosage with continued uncontrolled A1c   Rx Mounjaro 2.5 mg SC q7d and CGM   Referral back to Endo   F/u 4 weeks           Relevant Medications    tirzepatide (MOUNJARO) 2.5 mg/0.5 mL PnIj    insulin degludec (TRESIBA FLEXTOUCH U-200) 200 unit/mL (3 mL) insulin pen    flash glucose scanning reader (FREESTYLE JOHNNY 14 DAY READER) Misc    flash glucose sensor (FREESTYLE JOHNNY 14 DAY SENSOR) Kit    Other Relevant Orders    POCT HEMOGLOBIN A1C (Completed)    Ambulatory referral/consult to Endocrinology    Comprehensive Metabolic Panel       Future Appointments   Date Time Provider Department Center   5/13/2025  9:00 AM Cammy Christianson NP [facility]TM[facility] [place]   5/21/2025 10:30 AM Jeanette Wells FNP [facility] ENT [place]   7/30/2025  1:30 PM Nissa De La Rosa, WENDY [facility] CARD [place]        Follow up in about 4 weeks (around 5/7/2025) for DM/ Mounjaro  follow with up fasting labs before visit .    Signature:  Cammy Christianson NP  [facility]  [address] [facility] [address]    Date of encounter: 4/9/25         [1]   Social History  Socioeconomic History    Marital status: Single   Tobacco Use    Smoking status: Every Day     Current packs/day: 0.50     Average packs/day: 0.5 packs/day for 60.9 years (30.5 ttl pk-yrs)     Types: Cigarettes     Start date: 5/8/1964     Passive exposure: Current    Smokeless tobacco: Never   Substance and Sexual Activity    Alcohol use: Yes     Comment: beer 2x/month    Drug use: Yes     Types: Marijuana    Sexual activity: Not Currently     Social Drivers of Health     Financial Resource Strain: Low Risk  (5/19/2023)    Overall Financial Resource Strain (CARDIA)     Difficulty of Paying Living Expenses: Not hard at all   Food Insecurity: No Food Insecurity (5/19/2023)    Hunger Vital Sign     Worried About Running Out of Food in the Last Year: Never true     Ran Out of Food in the Last Year: Never true   Transportation Needs: No Transportation Needs (5/19/2023)    PRAPARE - Transportation     Lack of Transportation (Medical): No     Lack of Transportation (Non-Medical): No   Physical Activity: Inactive (5/19/2023)    Exercise Vital Sign     Days of Exercise per Week: 0 days     Minutes of Exercise per Session: 0 min   Stress: No Stress Concern Present (5/19/2023)    [employer] - Occupational Stress Questionnaire     Feeling of Stress : Not at all   Housing Stability: Low Risk  (5/19/2023)    Housing Stability Vital Sign     Unable to Pay for Housing in the Last Year: No     Number of Places Lived in the Last Year: 1     Unstable Housing in the Last Year: No

## 2025-04-17 ENCOUNTER — PATIENT OUTREACH (OUTPATIENT)
Facility: CLINIC | Age: 70
End: 2025-04-17
Payer: MEDICARE

## 2025-04-17 NOTE — PROGRESS NOTES
Health Maintenance Topic(s) Outreach Outcomes & Actions Taken:     Additional Notes:  Attempt made to contact patient. No answer. Message left with name and phone number for callback. Letter mailed.     Annual Wellness Visit: Due     Next PCP F/U: 5/13/2025  SDOH Incomplete- financial, transportation & food   Health Maintenance Topics Overdue:  Colon Screening- declined    HTN: uncontrolled    DM: Last A1c 12.6.     Chronic disease medication non compliance per dispense history.        Care Management, Digital Medicine, and/or Education Referrals      Next Steps - Referral Actions: Last A1c 12.6. Pt may benefit from DM Education referral. Letter mailed.

## 2025-04-17 NOTE — LETTER
Dear John Ferguson morning, My name is Radha, I am a nurse care coordinator for Cammy Christianson NP    [facility] is committed to your overall health. Periodically we review the health information in your chart to make sure you are up to date on all of your recommended tests and/or procedures.       Our review of your chart shows that you have diabetes and may benefit from our Diabetes Care and Education program.      I have enclosed some information about the program. If you are interested, and would like a referral, I can help get this ordered and scheduled for you and also see if there is anything else we can do to help you. You may replyto this message in your patient portal or give me a call at [phone].  I look forward to speaking with you.    This is a friendly reminder that you have an appointment scheduled with Cammy Christianson NP on 5/13/2025. If you have any questions, you may discuss them at that time if you choose to do so.    Thank you for choosing [facility] & have a great day!    YAMIL Garcia Care Coordinator  Cammy Christianson NP and your [facility] Team

## 2025-04-25 ENCOUNTER — LAB VISIT (OUTPATIENT)
Dept: LAB | Facility: HOSPITAL | Age: 70
End: 2025-04-25
Attending: NURSE PRACTITIONER
Payer: MEDICARE

## 2025-04-25 ENCOUNTER — OFFICE VISIT (OUTPATIENT)
Dept: ENDOCRINOLOGY | Facility: CLINIC | Age: 70
End: 2025-04-25
Payer: MEDICARE

## 2025-04-25 VITALS
WEIGHT: 255.5 LBS | HEIGHT: 70 IN | RESPIRATION RATE: 12 BRPM | TEMPERATURE: 98 F | DIASTOLIC BLOOD PRESSURE: 73 MMHG | HEART RATE: 80 BPM | BODY MASS INDEX: 36.58 KG/M2 | SYSTOLIC BLOOD PRESSURE: 118 MMHG

## 2025-04-25 DIAGNOSIS — Z79.4 TYPE 2 DIABETES MELLITUS WITH HYPERGLYCEMIA, WITH LONG-TERM CURRENT USE OF INSULIN: Primary | ICD-10-CM

## 2025-04-25 DIAGNOSIS — E11.65 TYPE 2 DIABETES MELLITUS WITH HYPERGLYCEMIA, WITH LONG-TERM CURRENT USE OF INSULIN: Primary | ICD-10-CM

## 2025-04-25 DIAGNOSIS — E11.65 TYPE 2 DIABETES MELLITUS WITH HYPERGLYCEMIA, WITH LONG-TERM CURRENT USE OF INSULIN: ICD-10-CM

## 2025-04-25 DIAGNOSIS — Z79.4 TYPE 2 DIABETES MELLITUS WITH HYPERGLYCEMIA, WITH LONG-TERM CURRENT USE OF INSULIN: ICD-10-CM

## 2025-04-25 LAB
ALBUMIN SERPL-MCNC: 3.2 G/DL (ref 3.4–4.8)
BUN SERPL-MCNC: 27 MG/DL (ref 8.4–25.7)
CALCIUM SERPL-MCNC: 9.2 MG/DL (ref 8.8–10)
CHLORIDE SERPL-SCNC: 104 MMOL/L (ref 98–107)
CO2 SERPL-SCNC: 27 MMOL/L (ref 23–31)
CREAT SERPL-MCNC: 0.99 MG/DL (ref 0.72–1.25)
GFR SERPLBLD CREATININE-BSD FMLA CKD-EPI: >60 ML/MIN/1.73/M2
GLUCOSE SERPL-MCNC: 188 MG/DL (ref 82–115)
PHOSPHATE SERPL-MCNC: 2.9 MG/DL (ref 2.3–4.7)
POTASSIUM SERPL-SCNC: 4.5 MMOL/L (ref 3.5–5.1)
SODIUM SERPL-SCNC: 137 MMOL/L (ref 136–145)

## 2025-04-25 PROCEDURE — 84681 ASSAY OF C-PEPTIDE: CPT

## 2025-04-25 PROCEDURE — 99215 OFFICE O/P EST HI 40 MIN: CPT | Mod: PBBFAC | Performed by: NURSE PRACTITIONER

## 2025-04-25 PROCEDURE — 86341 ISLET CELL ANTIBODY: CPT

## 2025-04-25 PROCEDURE — 36415 COLL VENOUS BLD VENIPUNCTURE: CPT

## 2025-04-25 PROCEDURE — 80069 RENAL FUNCTION PANEL: CPT

## 2025-04-25 RX ORDER — BLOOD-GLUCOSE,RECEIVER,CONT
EACH MISCELLANEOUS
Qty: 1 EACH | Refills: 0 | Status: SHIPPED | OUTPATIENT
Start: 2025-04-25

## 2025-04-25 RX ORDER — BLOOD-GLUCOSE SENSOR
EACH MISCELLANEOUS
Qty: 3 EACH | Refills: 11 | Status: SHIPPED | OUTPATIENT
Start: 2025-04-25

## 2025-04-25 NOTE — ASSESSMENT & PLAN NOTE
Labs today C-peptide, florencia 65, renal function panel  Discussed with patient the importance of compliance of taking medications as directed.  Discussed for patient to put a sign somewhat in the house he can see that remind him to take his medications.  Discussed results of hemoglobin A1c and its impact on his health.  Patient started on Mounjaro by his PCP, patient state he does not feel the impact of the medication yet.  Quit smoking.  Patient declines  Discussed following diabetic diet meal planning and healthy eating.  Discussed drinking enough water at least 6-8 cups a day.  Follow low-fat, low-cholesterol diet.  Keep fiber intake between 25-30 g per day.  Patient agreed to start Dexcom G7 monitoring.  Patient to return to clinic when he gets supplies to initiate.  Patient to return to clinic in 5 weeks or sooner if needed.  Continue medications as prescribed.  Questions solicited and answered, patient verbalized understanding and agreed to plan of care

## 2025-04-25 NOTE — PROGRESS NOTES
Patient Name: John Addison   : 1955  MRN: [id number]     SUBJECTIVE DATA:    CHIEF COMPLAINT:   John Addison is a 70 y.o. male who presents to clinic today with Type 2 diabetes        HPI:  70-year-old male presents to endocrine clinic to establish care and for diabetes management.  Past medical history pulmonary nodules, COPD, peripheral arterial disease, hypertension, hyperlipidemia, smoker    Current medications Mounjaro 2.5 mg subQ every 7 days, metformin a 1000 mg p.o. b.i.d., insulin Tresiba 64 units subQ nightly, glipizide 10 mg extended release twice daily with meals    2025 POC hemoglobin A1c 12.6  Patient endorses not compliant with diabetic medications.  Patient state he misses doses every once in awhile.  Patient state his daughter gives him his night injection of insulin.  Patient does not check CBG's  Patient denies hypoglycemic episodes.  Patient gave permission to speak to his daughter over the phone or in person so she can help manage his care.    Care plan:  Labs today C-peptide, florencia 65, renal function panel  Discussed with patient the importance of compliance of taking medications as directed.  Continue Mounjaro 2.5 mg subQ every 7 days, metformin a 1000 mg p.o. b.i.d., insulin Tresiba 64 units subQ nightly, glipizide 10 mg extended release twice daily with meals  Discussed for patient to put a sign somewhat in the house he can see that remind him to take his medications.  Discussed results of hemoglobin A1c and its impact on his health.  Patient started on Mounjaro by his PCP, patient state he does not feel the impact of the medication yet.  Quit smoking.  Patient declines  Discussed following diabetic diet meal planning and healthy eating.  Discussed drinking enough water at least 6-8 cups a day.  Follow low-fat, low-cholesterol diet.  Keep fiber intake between 25-30 g per day.  Patient agreed to start Dexcom G7 monitoring.  Patient to return to clinic when he gets supplies to  "initiate.  Patient to return to clinic in 5 weeks or sooner if needed.  Continue medications as prescribed.  Questions solicited and answered, patient verbalized understanding and agreed to plan of care      Patient denies chest pain, shortness of breath, dyspnea on exertion, palpitations, peripheral edema, abdominal pain, nausea, vomiting, diarrhea, constipation, fatigue, fever, chills, dysuria,  hematuria, dark stools or bloody stools          ALLERGIES: Review of patient's allergies indicates:  No Known Allergies      ROS:  Review of Systems   All other systems reviewed and are negative.        OBJECTIVE DATA:  Vital signs  Vitals:    04/25/25 0822   BP: 118/73   BP Location: Left arm   Patient Position: Sitting   Pulse: 80   Resp: 12   Temp: 97.6 °F (36.4 °C)   TempSrc: Oral   Weight: 115.9 kg (255 lb 7.5 oz)   Height: 5' 10" (1.778 m)      Body mass index is 36.66 kg/m².    PHYSICAL EXAM:   Physical Exam  Vitals and nursing note reviewed.   Constitutional:       General: He is awake. He is not in acute distress.     Appearance: Normal appearance. He is well-developed and well-groomed. He is obese. He is not ill-appearing, toxic-appearing or diaphoretic.   HENT:      Head: Normocephalic and atraumatic.      Right Ear: External ear normal.      Left Ear: External ear normal.      Nose: Nose normal.      Mouth/Throat:      Lips: Pink.      Mouth: Mucous membranes are moist.      Pharynx: Oropharynx is clear.   Eyes:      General: Lids are normal.      Extraocular Movements: Extraocular movements intact.      Pupils: Pupils are equal, round, and reactive to light.   Neck:      Thyroid: No thyroid mass, thyromegaly or thyroid tenderness.      Trachea: Trachea and phonation normal.   Cardiovascular:      Rate and Rhythm: Normal rate and regular rhythm.      Pulses: Normal pulses.           Radial pulses are 2+ on the right side and 2+ on the left side.      Heart sounds: Normal heart sounds. No murmur " heard.  Pulmonary:      Effort: Pulmonary effort is normal.      Breath sounds: Normal breath sounds and air entry. No wheezing.   Abdominal:      Palpations: Abdomen is soft.   Genitourinary:     Comments: Denies any urinary symptoms or bowel habit changes  Musculoskeletal:         General: Normal range of motion.      Cervical back: Normal range of motion.   Lymphadenopathy:      Cervical: No cervical adenopathy.   Skin:     General: Skin is warm.      Capillary Refill: Capillary refill takes less than 2 seconds.   Neurological:      General: No focal deficit present.      Mental Status: He is alert and oriented to person, place, and time. Mental status is at baseline.      GCS: GCS eye subscore is 4. GCS verbal subscore is 5. GCS motor subscore is 6.      Cranial Nerves: No cranial nerve deficit.      Sensory: No sensory deficit.      Motor: No weakness.      Coordination: Coordination normal.      Gait: Gait abnormal (Utilizing a cane to ambulate).   Psychiatric:         Mood and Affect: Mood normal.         Behavior: Behavior normal. Behavior is cooperative.         Thought Content: Thought content normal.         Judgment: Judgment normal.          ASSESSMENT/PLAN:  1. Type 2 diabetes mellitus with hyperglycemia, with long-term current use of insulin  Assessment & Plan:  Labs today C-peptide, florencia 65, renal function panel  Discussed with patient the importance of compliance of taking medications as directed.  Discussed for patient to put a sign somewhat in the house he can see that remind him to take his medications.  Discussed results of hemoglobin A1c and its impact on his health.  Patient started on Mounjaro by his PCP, patient state he does not feel the impact of the medication yet.  Quit smoking.  Patient declines  Discussed following diabetic diet meal planning and healthy eating.  Discussed drinking enough water at least 6-8 cups a day.  Follow low-fat, low-cholesterol diet.  Keep fiber intake between  25-30 g per day.  Patient agreed to start Dexcom G7 monitoring.  Patient to return to clinic when he gets supplies to initiate.  Patient to return to clinic in 5 weeks or sooner if needed.  Continue medications as prescribed.  Questions solicited and answered, patient verbalized understanding and agreed to plan of care    Orders:  -     Ambulatory referral/consult to Endocrinology  -     blood-glucose sensor (DEXCOM G7 SENSOR) Alysha; Change every 10 days  Dispense: 3 each; Refill: 11  -     blood-glucose,,cont (DEXCOM G7 ) Misc; Dexcom G7  uses directed  Dispense: 1 each; Refill: 0  -     C-Peptide; Future; Expected date: 04/25/2025  -     Glutamic Acid Decarboxylase; Future; Expected date: 04/25/2025  -     Renal Function Panel; Future; Expected date: 04/25/2025           RESULTS:  Recent Results (from the past 6 weeks)   POCT HEMOGLOBIN A1C    Collection Time: 04/09/25 12:06 PM   Result Value Ref Range    Hemoglobin A1C, POC 12.6 %         Follow Up:  Follow up in about 5 weeks (around 5/30/2025).      Previous medical history/lab work/radiology reviewed and considered during medical management decisions.   Medication list reviewed and medication reconciliation performed.  Patient was provided  and care about his/her current diagnosis (es) and medications including risk/benefit and side effects/adverse events, over the counter medication uses/doses, home self-care and contact precautions,  and red flags and indications for when to seek immediate medical attention.   Patient was advised to continue compliance with current medication list and medical recommendations.  Patient dvised continued compliance with recommended eating habits/ diets for medical conditions and exercise 150 minutes/ week (if possible) for medical condition (s).  Educational handouts and instructions on selected disease management in AVS (After Visit Summary).    All of the patient's questions were answered to  patient's satisfaction.   The patient was receptive, expressed verbal understanding and agreement the above plan.          This note was created with the assistance of a voice recognition software or phone dictation. There may be transcription errors as a result of using this technology however minimal. Effort has been made to assure accuracy of transcription but any obvious errors or omissions should be clarified with the author of the document

## 2025-04-29 LAB — GAD65 AB SER-SCNC: 0 NMOL/L

## 2025-04-30 ENCOUNTER — RESULTS FOLLOW-UP (OUTPATIENT)
Dept: ENDOCRINOLOGY | Facility: CLINIC | Age: 70
End: 2025-04-30

## 2025-04-30 LAB — C PEPTIDE P FAST SERPL-MCNC: 3.6 NG/ML (ref 1.1–4.4)

## 2025-04-30 NOTE — PROGRESS NOTES
Please notify patient that he is type 2 diabetic  C-peptide 3.6   A C-peptide test measures the amount of C-peptide in the blood . This test helps determine how much insulin the body is producing   Continue medications as prescribed.

## 2025-05-13 ENCOUNTER — LAB VISIT (OUTPATIENT)
Dept: LAB | Facility: HOSPITAL | Age: 70
End: 2025-05-13
Attending: NURSE PRACTITIONER
Payer: MEDICARE

## 2025-05-13 ENCOUNTER — OFFICE VISIT (OUTPATIENT)
Dept: INTERNAL MEDICINE | Facility: CLINIC | Age: 70
End: 2025-05-13
Payer: MEDICARE

## 2025-05-13 VITALS
OXYGEN SATURATION: 100 % | BODY MASS INDEX: 36.03 KG/M2 | SYSTOLIC BLOOD PRESSURE: 138 MMHG | RESPIRATION RATE: 20 BRPM | TEMPERATURE: 98 F | DIASTOLIC BLOOD PRESSURE: 81 MMHG | HEIGHT: 70 IN | WEIGHT: 251.69 LBS | HEART RATE: 80 BPM

## 2025-05-13 DIAGNOSIS — Z79.4 TYPE 2 DIABETES MELLITUS WITH DIABETIC NEUROPATHY, WITH LONG-TERM CURRENT USE OF INSULIN: Primary | ICD-10-CM

## 2025-05-13 DIAGNOSIS — E66.01 SEVERE OBESITY (BMI 35.0-39.9) WITH COMORBIDITY: ICD-10-CM

## 2025-05-13 DIAGNOSIS — D64.9 ANEMIA, UNSPECIFIED TYPE: ICD-10-CM

## 2025-05-13 DIAGNOSIS — E11.65 TYPE 2 DIABETES MELLITUS WITH HYPERGLYCEMIA, WITH LONG-TERM CURRENT USE OF INSULIN: ICD-10-CM

## 2025-05-13 DIAGNOSIS — Z79.4 TYPE 2 DIABETES MELLITUS WITH HYPERGLYCEMIA, WITH LONG-TERM CURRENT USE OF INSULIN: ICD-10-CM

## 2025-05-13 DIAGNOSIS — J44.9 CHRONIC OBSTRUCTIVE PULMONARY DISEASE, UNSPECIFIED COPD TYPE: ICD-10-CM

## 2025-05-13 DIAGNOSIS — E11.40 TYPE 2 DIABETES MELLITUS WITH DIABETIC NEUROPATHY, WITH LONG-TERM CURRENT USE OF INSULIN: Primary | ICD-10-CM

## 2025-05-13 DIAGNOSIS — E78.5 HYPERLIPIDEMIA LDL GOAL <70: ICD-10-CM

## 2025-05-13 DIAGNOSIS — E11.65 HYPERGLYCEMIA DUE TO DIABETES MELLITUS: ICD-10-CM

## 2025-05-13 DIAGNOSIS — N40.0 BENIGN PROSTATIC HYPERPLASIA, UNSPECIFIED WHETHER LOWER URINARY TRACT SYMPTOMS PRESENT: ICD-10-CM

## 2025-05-13 DIAGNOSIS — I25.10 CORONARY ARTERY DISEASE, UNSPECIFIED VESSEL OR LESION TYPE, UNSPECIFIED WHETHER ANGINA PRESENT, UNSPECIFIED WHETHER NATIVE OR TRANSPLANTED HEART: ICD-10-CM

## 2025-05-13 DIAGNOSIS — I10 PRIMARY HYPERTENSION: ICD-10-CM

## 2025-05-13 LAB
ALBUMIN SERPL-MCNC: 3.1 G/DL (ref 3.4–4.8)
ALBUMIN/GLOB SERPL: 0.7 RATIO (ref 1.1–2)
ALP SERPL-CCNC: 123 UNIT/L (ref 40–150)
ALT SERPL-CCNC: 36 UNIT/L (ref 0–55)
ANION GAP SERPL CALC-SCNC: 6 MEQ/L
AST SERPL-CCNC: 22 UNIT/L (ref 11–45)
BILIRUB SERPL-MCNC: 0.5 MG/DL
BUN SERPL-MCNC: 29 MG/DL (ref 8.4–25.7)
CALCIUM SERPL-MCNC: 9.1 MG/DL (ref 8.8–10)
CHLORIDE SERPL-SCNC: 107 MMOL/L (ref 98–107)
CO2 SERPL-SCNC: 27 MMOL/L (ref 23–31)
CREAT SERPL-MCNC: 1.21 MG/DL (ref 0.72–1.25)
CREAT/UREA NIT SERPL: 24
GFR SERPLBLD CREATININE-BSD FMLA CKD-EPI: >60 ML/MIN/1.73/M2
GLOBULIN SER-MCNC: 4.5 GM/DL (ref 2.4–3.5)
GLUCOSE SERPL-MCNC: 191 MG/DL (ref 82–115)
POTASSIUM SERPL-SCNC: 4.7 MMOL/L (ref 3.5–5.1)
PROT SERPL-MCNC: 7.6 GM/DL (ref 5.8–7.6)
SODIUM SERPL-SCNC: 140 MMOL/L (ref 136–145)

## 2025-05-13 PROCEDURE — 99215 OFFICE O/P EST HI 40 MIN: CPT | Mod: PBBFAC | Performed by: NURSE PRACTITIONER

## 2025-05-13 PROCEDURE — 99214 OFFICE O/P EST MOD 30 MIN: CPT | Mod: S$PBB,,, | Performed by: NURSE PRACTITIONER

## 2025-05-13 PROCEDURE — 80053 COMPREHEN METABOLIC PANEL: CPT

## 2025-05-13 PROCEDURE — 36415 COLL VENOUS BLD VENIPUNCTURE: CPT

## 2025-05-13 RX ORDER — FINASTERIDE 5 MG/1
5 TABLET, FILM COATED ORAL DAILY
Qty: 90 TABLET | Refills: 1 | Status: SHIPPED | OUTPATIENT
Start: 2025-05-13 | End: 2026-05-13

## 2025-05-13 RX ORDER — FAMOTIDINE 40 MG/1
20 TABLET, FILM COATED ORAL 2 TIMES DAILY
Qty: 180 TABLET | Refills: 1 | Status: SHIPPED | OUTPATIENT
Start: 2025-05-13

## 2025-05-13 RX ORDER — ATORVASTATIN CALCIUM 80 MG/1
80 TABLET, FILM COATED ORAL DAILY
Qty: 90 TABLET | Refills: 1 | Status: SHIPPED | OUTPATIENT
Start: 2025-05-13 | End: 2026-05-08

## 2025-05-13 RX ORDER — FLUTICASONE PROPIONATE AND SALMETEROL 250; 50 UG/1; UG/1
1 POWDER RESPIRATORY (INHALATION) 2 TIMES DAILY
Qty: 180 EACH | Refills: 1 | Status: SHIPPED | OUTPATIENT
Start: 2025-05-13 | End: 2026-05-13

## 2025-05-13 RX ORDER — FOLIC ACID 1 MG/1
1 TABLET ORAL DAILY
Qty: 90 TABLET | Refills: 1 | Status: SHIPPED | OUTPATIENT
Start: 2025-05-13

## 2025-05-13 RX ORDER — TAMSULOSIN HYDROCHLORIDE 0.4 MG/1
0.8 CAPSULE ORAL DAILY
Qty: 180 CAPSULE | Refills: 1 | Status: SHIPPED | OUTPATIENT
Start: 2025-05-13 | End: 2026-05-13

## 2025-05-13 RX ORDER — DULAGLUTIDE 4.5 MG/.5ML
INJECTION, SOLUTION SUBCUTANEOUS
COMMUNITY
Start: 2025-05-07 | End: 2025-05-13

## 2025-05-13 RX ORDER — ALBUTEROL SULFATE 90 UG/1
2 INHALANT RESPIRATORY (INHALATION) EVERY 6 HOURS PRN
Qty: 18 G | Refills: 5 | Status: SHIPPED | OUTPATIENT
Start: 2025-05-13

## 2025-05-13 NOTE — ASSESSMENT & PLAN NOTE
BMI 36.12  Pt needs better control of DM  Educated on increased risk of disease s/t obesity.  Educated on health benefits of at least 5 days/ week of 30 minutes moderate intensity exercise (brisk walking) and 2 or more days/ week of muscle strength activities (as tolerated).  Eat well balanced diet of fresh fruits/ vegetables, whole grains, lean meats and limit high carbohydrate foods.

## 2025-05-13 NOTE — ASSESSMENT & PLAN NOTE
BP Readings from Last 3 Encounters:   05/13/25 138/81   04/25/25 118/73   04/09/25 136/73     Follow low sodium diet, < 2 gm/day (avoid high salty foods such as processed meats/ sausage/hurtado/ sandwich meat, chips, pickles, cheese, crackers and soft drinks/ electrolyte replacement drinks).  Avoid tobacco/ alcohol use  Educated on health benefits of at least 5 days/ week of 30 minutes moderate intensity exercise (brisk walking) and 2 or more days/ week of muscle strength activities  Daily ASA 81 mg for CV prevention  Continue current medication regimen

## 2025-05-13 NOTE — PATIENT INSTRUCTIONS
Keep all upcoming appointments.     Please call to reschedule missed appointments with the following:   Nephrology   Vascular   Eye clinic

## 2025-05-13 NOTE — PROGRESS NOTES
Cammy L Sammy, NP   [facility]  [address] [address]      PATIENT NAME: John Addison  : 1955  DATE: 25  MRN: [id number]        History of Present Illness / Problem Focused Workflow     John Addison presents to the clinic with Follow-up and Diabetes     71 yo male unaccompanied for follow up.  Last seen 25. PMH includes CAD/NSTEMI (2016) s/p PCI of left circumflex, inferior wall STEMI s/p PTCA/BHAKTI of distal RCA 2017, yasmin gangrene of scrotum, alcohol abuse. Active diagnosis include HTN, HLD, PAD, COPD, pulmonary nodule, type 2 DM, elevated LFTs, BPH, hearing loss, abd lymphadenopathy, onychomycosis, tobacco abuse, obesity and medical non compliance. Labs this morning, pending. /81. He has since established with Endocrinology. He states he is taking Mounjaro and no longer taking Trulicity. He is awaiting receipt of Dexcom. He has not been seen by any other providers/ clinics since last visit. Mentions he fell at [facility] about 2 weeks ago and landed on his knees. States he tripped over his feet. Denies any dizziness, weakness, or injuries. He states he needs to leave soon because he has an appointment with social security office and was late for his visit this morning. He denies any CP, SOB, coughing, abd pain, poor appetite, n/v/d, bloody stools.     Other providers   [facility] Ophthalmology - referred 2024  [facility] Wound Care- missed appointments  [facility] Vascular- multiple missed appointments   [facility] Cardiology 9/10/24  [facility] GI   [facility] Pulmonology- last visit 21  [facility] ENT - 25  Dr. Romero/ Dr. Juarez Podiatry- unable to confirm pt received appts ?   [facility] Endo  Former: Dr. Smith Lay- General Surgeon  Renal     He missed nephrology appointment 25  He had vascular appointment 10/1/24 and 10/29/24-  missed.   He had wound care appointment 10/29/24- missed.   He was scheduled for vascular US  10/23/24- missed.    Review of Systems     Review of Systems   Constitutional: Negative.    HENT: Negative.     Eyes: Negative.    Respiratory: Negative.     Cardiovascular: Negative.    Gastrointestinal: Negative.    Endocrine: Negative.    Genitourinary: Negative.    Musculoskeletal: Negative.    Skin: Negative.    Allergic/Immunologic: Negative.    Neurological: Negative.    Hematological: Negative.    Psychiatric/Behavioral: Negative.         Medical / Social / Family History     -------------------------------------    Abscess    Abscess of groin, left    COPD (chronic obstructive pulmonary disease)    Coronary artery disease    Diabetes mellitus    Yesi's gangrene    Yesi's gangrene of scrotum    hyperlipidemia    Hypertension        Past Surgical History:   Procedure Laterality Date    CORONARY ANGIOPLASTY      EXCISION, LESION, LOWER EXTREMITY Left 5/25/2023    Procedure: EXCISION, LESION, LOWER EXTREMITY;  Surgeon: South Terry MD;  Location: [facility];  Service: General;  Laterality: Left;    TONSILLECTOMY  07/2018    WOUND DEBRIDEMENT N/A 5/10/2023    Procedure: DEBRIDEMENT, WOUND;  Surgeon: Smith Lay MD;  Location: [facility];  Service: General;  Laterality: N/A;  perineum debridement, lithotomy       Social History[1]     Family History   Problem Relation Name Age of Onset    Hypertension Mother      Heart failure Mother      Heart attack Mother      Coronary artery disease Mother      Cancer Father      Cancer Sister          Medications and Allergies     Medications  Current Outpatient Medications   Medication Instructions    albuterol (PROVENTIL/VENTOLIN HFA) 90 mcg/actuation inhaler 2 puffs, Inhalation, Every 6 hours PRN    aspirin 81 mg, Oral, Daily    atorvastatin (LIPITOR) 80 mg, Oral, Daily    blood sugar diagnostic Strp To check BG three times daily, to use with insurance preferred meter    blood-glucose meter kit To check BG three times daily, to use with insurance preferred  "meter    blood-glucose sensor (DEXCOM G7 SENSOR) Alysha Change every 10 days    blood-glucose,,cont (DEXCOM G7 ) Misc Dexcom G7  uses directed    clopidogreL (PLAVIX) 75 mg, Oral, Daily    docusate sodium (COLACE) 100 mg, As needed (PRN)    famotidine (PEPCID) 20 mg, Oral, 2 times daily    ferrous sulfate 324 mg, Oral, Daily    finasteride (PROSCAR) 5 mg, Oral, Daily    flash glucose scanning reader (FREESTYLE JOHNNY 14 DAY READER) Misc Use daily for continuous glucose monitoring.    flash glucose sensor (FREESTYLE JOHNNY 14 DAY SENSOR) Kit Use daily for continuous glucose monitoring.    fluticasone-salmeterol diskus inhaler 250-50 mcg 1 puff, Inhalation, 2 times daily, Controller    folic acid (FOLVITE) 1 mg, Oral, Daily    gabapentin (NEURONTIN) 300 mg, Oral, 3 times daily PRN    glipiZIDE (GLUCOTROL) 10 mg, Oral, 2 times daily    insulin degludec (TRESIBA FLEXTOUCH U-200) 64 Units, Subcutaneous, Nightly    lancets Misc To check BG three times daily, to use with insurance preferred meter    lisinopriL 10 mg, Oral, Daily    metFORMIN (GLUCOPHAGE) 1,000 mg, Oral, 2 times daily with meals    metoprolol tartrate (LOPRESSOR) 50 mg, Oral, 2 times daily    MOUNJARO 2.5 mg, Subcutaneous, Every 7 days    mupirocin (BACTROBAN) 2 % ointment Topical (Top), 2 times daily    pen needle, diabetic (BD ULTRA-FINE GOOD PEN NEEDLE) 32 gauge x 5/32" Ndle USE 1 SYRINGE DAILY 100 DAY SUPPLY    sodium bicarbonate 650 mg, Oral, 2 times daily    tamsulosin (FLOMAX) 0.8 mg, Oral, Daily       Allergies  Review of patient's allergies indicates:  No Known Allergies    Physical Examination     Visit Vitals  /81 (BP Location: Left arm, Patient Position: Sitting)   Pulse 80   Temp 97.7 °F (36.5 °C) (Oral)   Resp 20   Ht 5' 10" (1.778 m)   Wt 114.2 kg (251 lb 11.2 oz)   SpO2 100%   BMI 36.12 kg/m²       Physical Exam  Constitutional:       General: He is not in acute distress.     Appearance: Normal appearance. He is " normal weight. He is not ill-appearing, toxic-appearing or diaphoretic.   HENT:      Head: Normocephalic.   Cardiovascular:      Rate and Rhythm: Normal rate and regular rhythm.      Pulses:           Dorsalis pedis pulses are 2+ on the right side and 2+ on the left side.        Posterior tibial pulses are 2+ on the right side and 2+ on the left side.   Pulmonary:      Effort: Pulmonary effort is normal. No respiratory distress.      Breath sounds: Normal breath sounds. No stridor. No wheezing, rhonchi or rales.   Musculoskeletal:      Right lower leg: No edema.      Left lower leg: No edema.      Comments: Ambulating with cane   Skin:     General: Skin is warm and dry.   Neurological:      General: No focal deficit present.      Mental Status: He is alert and oriented to person, place, and time. Mental status is at baseline.      Motor: No weakness.      Coordination: Coordination normal.      Gait: Gait normal.   Psychiatric:         Mood and Affect: Mood normal.         Behavior: Behavior normal.         Thought Content: Thought content normal.         Judgment: Judgment normal.           Results     Lab Results   Component Value Date    WBC 8.44 01/03/2025    RBC 5.94 01/03/2025    HGB 15.6 01/03/2025    HCT 46.9 01/03/2025    MCV 79.0 (L) 01/03/2025    MCH 26.3 (L) 01/03/2025    MCHC 33.3 01/03/2025    RDW 18.9 (H) 01/03/2025     01/03/2025    MPV 10.5 (H) 01/03/2025     Sodium   Date Value Ref Range Status   05/13/2025 140 136 - 145 mmol/L Final     Potassium   Date Value Ref Range Status   05/13/2025 4.7 3.5 - 5.1 mmol/L Final     Chloride   Date Value Ref Range Status   05/13/2025 107 98 - 107 mmol/L Final     CO2   Date Value Ref Range Status   05/13/2025 27 23 - 31 mmol/L Final     Glucose   Date Value Ref Range Status   05/13/2025 191 (H) 82 - 115 mg/dL Final     Blood Urea Nitrogen   Date Value Ref Range Status   05/13/2025 29.0 (H) 8.4 - 25.7 mg/dL Final     Creatinine   Date Value Ref Range  Status   05/13/2025 1.21 0.72 - 1.25 mg/dL Final     Calcium   Date Value Ref Range Status   05/13/2025 9.1 8.8 - 10.0 mg/dL Final     Protein Total   Date Value Ref Range Status   05/13/2025 7.6 5.8 - 7.6 gm/dL Final     Albumin   Date Value Ref Range Status   05/13/2025 3.1 (L) 3.4 - 4.8 g/dL Final     Bilirubin Total   Date Value Ref Range Status   05/13/2025 0.5 <=1.5 mg/dL Final     ALP   Date Value Ref Range Status   05/13/2025 123 40 - 150 unit/L Final     AST   Date Value Ref Range Status   05/13/2025 22 11 - 45 unit/L Final     ALT   Date Value Ref Range Status   05/13/2025 36 0 - 55 unit/L Final     Estimated GFR-Non    Date Value Ref Range Status   06/26/2018 65 (L) >>=90 mL/min Final     Lab Results   Component Value Date    CHOL 135 12/02/2024     Lab Results   Component Value Date    HDL 35 12/02/2024     Lab Results   Component Value Date    TRIG 246 (H) 12/02/2024     Lab Results   Component Value Date    LDL 51.00 12/02/2024     Lab Results   Component Value Date    TSH 1.883 12/02/2024     Lab Results   Component Value Date    PHUR 6.0 01/03/2025    SPECGRAV 1.030 07/17/2023    PROTEINUA 2+ (A) 01/03/2025    GLUCUA 4+ (A) 01/03/2025    KETONESU neg 07/17/2023    OCCULTUA Trace (A) 01/03/2025    NITRITE 2+ (A) 01/03/2025    LEUKOCYTESUR 75 (A) 01/03/2025     Lab Results   Component Value Date    HGBA1C 11.6 (H) 12/02/2024    HGBA1C 13.9 (H) 03/12/2024    HGBA1C 9.4 (H) 08/28/2023     Lab Results   Component Value Date    MICALBCREAT 538.4 (H) 12/02/2024        Assessment       ICD-10-CM ICD-9-CM   1. Type 2 diabetes mellitus with diabetic neuropathy, with long-term current use of insulin  E11.40 250.60    Z79.4 357.2     V58.67   2. Type 2 diabetes mellitus with hyperglycemia, with long-term current use of insulin  E11.65 250.00    Z79.4 790.29     V58.67   3. Hyperglycemia due to diabetes mellitus  E11.65 250.02   4. Chronic obstructive pulmonary disease, unspecified COPD type   J44.9 496   5. Coronary artery disease, unspecified vessel or lesion type, unspecified whether angina present, unspecified whether native or transplanted heart  I25.10 414.00   6. Hyperlipidemia LDL goal <70  E78.5 272.4   7. Primary hypertension  I10 401.9   8. Benign prostatic hyperplasia, unspecified whether lower urinary tract symptoms present  N40.0 600.00   9. Anemia, unspecified type  D64.9 285.9   10. Severe obesity (BMI 35.0-39.9) with comorbidity  E66.01 278.01   11. Body mass index (BMI) 36.0-36.9, adult  Z68.36 V85.36       Plan       Problem List Items Addressed This Visit          Pulmonary    Chronic obstructive pulmonary disease    Current Assessment & Plan   Stable. Asymptomatic. Continue rx.         Relevant Medications    albuterol (PROVENTIL/VENTOLIN HFA) 90 mcg/actuation inhaler    fluticasone-salmeterol diskus inhaler 250-50 mcg       Cardiac/Vascular    Coronary artery disease    Current Assessment & Plan   Stable. Asymptomatic. Continue meds and f/u with Card         Relevant Medications    atorvastatin (LIPITOR) 80 MG tablet    Other Relevant Orders    CBC Auto Differential    Comprehensive Metabolic Panel    Lipid Panel    Microalbumin/Creatinine Ratio, Urine    Vitamin D    Hyperlipidemia LDL goal <70    Current Assessment & Plan   Stable. Labs with f/u. Continue atorvastatin 80 mg         Relevant Medications    atorvastatin (LIPITOR) 80 MG tablet    Other Relevant Orders    CBC Auto Differential    Comprehensive Metabolic Panel    Lipid Panel    Microalbumin/Creatinine Ratio, Urine    Vitamin D    Hypertension    Current Assessment & Plan   BP Readings from Last 3 Encounters:   05/13/25 138/81   04/25/25 118/73   04/09/25 136/73     Follow low sodium diet, < 2 gm/day (avoid high salty foods such as processed meats/ sausage/hurtado/ sandwich meat, chips, pickles, cheese, crackers and soft drinks/ electrolyte replacement drinks).  Avoid tobacco/ alcohol use  Educated on health benefits of at  least 5 days/ week of 30 minutes moderate intensity exercise (brisk walking) and 2 or more days/ week of muscle strength activities  Daily ASA 81 mg for CV prevention  Continue current medication regimen           Relevant Medications    atorvastatin (LIPITOR) 80 MG tablet    Other Relevant Orders    CBC Auto Differential    Comprehensive Metabolic Panel    Lipid Panel    Microalbumin/Creatinine Ratio, Urine    Vitamin D       Renal/    BPH (benign prostatic hyperplasia)    Current Assessment & Plan   Stable. Continue medications  Lab Results   Component Value Date    PSA 1.44 12/02/2024              Relevant Medications    tamsulosin (FLOMAX) 0.4 mg Cap    finasteride (PROSCAR) 5 mg tablet       Oncology    Anemia    Current Assessment & Plan   Stable last CBC 1/2025. Continue ferrous sulfate, folic acid, f/u with labs          Relevant Medications    folic acid (FOLVITE) 1 MG tablet    Other Relevant Orders    CBC Auto Differential    Comprehensive Metabolic Panel    Lipid Panel    Microalbumin/Creatinine Ratio, Urine    Vitamin D       Endocrine    Hyperglycemia due to diabetes mellitus    Current Assessment & Plan   See DM  Glucose trending downward per CMP review this morning  Continue medications, f/u with Endo as scheduled 5/30/25         Relevant Orders    CBC Auto Differential    Comprehensive Metabolic Panel    Lipid Panel    Microalbumin/Creatinine Ratio, Urine    Vitamin D    Severe obesity (BMI 35.0-39.9) with comorbidity    Current Assessment & Plan   BMI 36.12  Pt needs better control of DM  Educated on increased risk of disease s/t obesity.  Educated on health benefits of at least 5 days/ week of 30 minutes moderate intensity exercise (brisk walking) and 2 or more days/ week of muscle strength activities (as tolerated).  Eat well balanced diet of fresh fruits/ vegetables, whole grains, lean meats and limit high carbohydrate foods.            Relevant Orders    CBC Auto Differential    Comprehensive  Metabolic Panel    Lipid Panel    Microalbumin/Creatinine Ratio, Urine    Vitamin D    Type 2 diabetes mellitus with diabetic neuropathy, with long-term current use of insulin - Primary    Relevant Orders    CBC Auto Differential    Comprehensive Metabolic Panel    Lipid Panel    Microalbumin/Creatinine Ratio, Urine    Vitamin D    Type 2 diabetes mellitus with hyperglycemia, with long-term current use of insulin    Current Assessment & Plan   Lab Results   Component Value Date    HGBA1C 11.6 (H) 12/02/2024     Lab Results   Component Value Date    QWQMGMD5T 12.6 04/09/2025     CBGs: denies checking; awaiting Dexcom  Hypoglycemia episodes:  Microalbumin:   Lab Results   Component Value Date    MICALBCREAT 538.4 (H) 12/02/2024     Educated on ADA diet: eliminate/decrease high carbohydrate foods (rice, pasta, bread, potatoes (french fries, chips), candy, sweets, fruit juices, canned fruit, junk food, crackers, carbonated beverages)  Educated on health benefits of at least 5 days/ week of 30 minutes moderate intensity exercise (brisk walking) and 2 or more days/ week of muscle strength activities  Avoid alcohol or tobacco use  Eye exam: UTD  Foot exam: UTD  Per recommendations, continue with ACEI/ARB, Daily ASA 81 mg for CV prevention, Statin therapy  Continue with current regimen-- defer further mgmt to [facility] Endo- keep appointments as scheduled please            Relevant Medications    atorvastatin (LIPITOR) 80 MG tablet    Other Relevant Orders    CBC Auto Differential    Comprehensive Metabolic Panel    Lipid Panel    Microalbumin/Creatinine Ratio, Urine    Vitamin D     Other Visit Diagnoses         Body mass index (BMI) 36.0-36.9, adult        Relevant Orders    Vitamin D            Future Appointments   Date Time Provider Department Center   5/21/2025 10:30 AM Jeanette Wells FNP [facility] ENT [place]   5/30/2025  8:00 AM Martina Strange FNP [facility] ENDOCR [place]   7/30/2025  1:30 PM Nissa De La Rosa PA-C  [facility] SCOTT [place]   11/11/2025  9:20 AM Cammy Christianson NP [facility] [place]        Follow up in about 6 months (around 11/13/2025) for with fasting labs before visit.    Signature:  Cammy Christianson NP  [facility]  [address]  [place] [address]    Date of encounter: 5/13/25         [1]   Social History  Socioeconomic History    Marital status: Single   Tobacco Use    Smoking status: Every Day     Current packs/day: 0.50     Average packs/day: 0.5 packs/day for 61.0 years (30.5 ttl pk-yrs)     Types: Cigarettes     Start date: 5/8/1964     Passive exposure: Current    Smokeless tobacco: Never   Substance and Sexual Activity    Alcohol use: Yes     Comment: beer 2x/month    Drug use: Yes     Types: Marijuana    Sexual activity: Not Currently     Social Drivers of Health     Financial Resource Strain: Low Risk  (5/19/2023)    Overall Financial Resource Strain (CARDIA)     Difficulty of Paying Living Expenses: Not hard at all   Food Insecurity: No Food Insecurity (5/19/2023)    Hunger Vital Sign     Worried About Running Out of Food in the Last Year: Never true     Ran Out of Food in the Last Year: Never true   Transportation Needs: No Transportation Needs (5/19/2023)    PRAPARE - Transportation     Lack of Transportation (Medical): No     Lack of Transportation (Non-Medical): No   Physical Activity: Inactive (5/19/2023)    Exercise Vital Sign     Days of Exercise per Week: 0 days     Minutes of Exercise per Session: 0 min   Stress: No Stress Concern Present (5/19/2023)    [employer] [employer] - Occupational Stress Questionnaire     Feeling of Stress : Not at all   Housing Stability: Low Risk  (5/19/2023)    Housing Stability Vital Sign     Unable to Pay for Housing in the Last Year: No     Number of Places Lived in the Last Year: 1     Unstable Housing in the Last Year: No

## 2025-05-13 NOTE — ASSESSMENT & PLAN NOTE
Lab Results   Component Value Date    HGBA1C 11.6 (H) 12/02/2024     Lab Results   Component Value Date    [id number] 12.6 04/09/2025     CBGs: denies checking; awaiting Dexcom  Hypoglycemia episodes:  Microalbumin:   Lab Results   Component Value Date    MICALBCREAT 538.4 (H) 12/02/2024     Educated on ADA diet: eliminate/decrease high carbohydrate foods (rice, pasta, bread, potatoes (french fries, chips), candy, sweets, fruit juices, canned fruit, junk food, crackers, carbonated beverages)  Educated on health benefits of at least 5 days/ week of 30 minutes moderate intensity exercise (brisk walking) and 2 or more days/ week of muscle strength activities  Avoid alcohol or tobacco use  Eye exam: UTD  Foot exam: UTD  Per recommendations, continue with ACEI/ARB, Daily ASA 81 mg for CV prevention, Statin therapy  Continue with current regimen-- defer further mgmt to [facility] Endo- keep appointments as scheduled please

## 2025-05-13 NOTE — ASSESSMENT & PLAN NOTE
See DM  Glucose trending downward per CMP review this morning  Continue medications, f/u with Endo as scheduled 5/30/25

## 2025-05-30 ENCOUNTER — OFFICE VISIT (OUTPATIENT)
Dept: ENDOCRINOLOGY | Facility: CLINIC | Age: 70
End: 2025-05-30
Payer: MEDICARE

## 2025-05-30 VITALS
BODY MASS INDEX: 36.37 KG/M2 | HEART RATE: 102 BPM | TEMPERATURE: 98 F | WEIGHT: 254.06 LBS | HEIGHT: 70 IN | SYSTOLIC BLOOD PRESSURE: 131 MMHG | RESPIRATION RATE: 18 BRPM | DIASTOLIC BLOOD PRESSURE: 80 MMHG

## 2025-05-30 DIAGNOSIS — E11.65 TYPE 2 DIABETES MELLITUS WITH HYPERGLYCEMIA, WITH LONG-TERM CURRENT USE OF INSULIN: Primary | ICD-10-CM

## 2025-05-30 DIAGNOSIS — Z79.4 TYPE 2 DIABETES MELLITUS WITH HYPERGLYCEMIA, WITH LONG-TERM CURRENT USE OF INSULIN: Primary | ICD-10-CM

## 2025-05-30 LAB — GLUCOSE SERPL-MCNC: 100 MG/DL (ref 70–110)

## 2025-05-30 PROCEDURE — 99215 OFFICE O/P EST HI 40 MIN: CPT | Mod: PBBFAC | Performed by: NURSE PRACTITIONER

## 2025-05-30 RX ORDER — TIRZEPATIDE 2.5 MG/.5ML
2.5 INJECTION, SOLUTION SUBCUTANEOUS
Qty: 4 PEN | Refills: 1 | Status: SHIPPED | OUTPATIENT
Start: 2025-05-30

## 2025-05-30 NOTE — ASSESSMENT & PLAN NOTE
Dexcom interpretation 05/17/2025 - 05/30/2025, Days with CGM data 11/14 days.  GM I 9.1  %.  Average glucose 240 mg/dL.  Time in range 18 %, very high 45%,  high 37 % ,  low 0 %, <0 % very low.  On patient's best day average glucose 197 mg/dL with 42 % in range.  Insulin data is lacking limiting interpretation at times.  Patient not on prandial insulin only on long acting insulin.  Patient currently on glipizide 10 mg extended release p.o. twice daily.  Patient does have prandials spikes above 250 mg/dL because he endorses eating large amount of carbohydrates.  Also patient endorses eating early mornings as well consuming soft drinks and sugary drinks all day.  At times blood glucose response well when he takes his medications.  Patient endorses at times he forget to take his medicine.         Care Plan:   Hemoglobin A1c blood goal is less than 7%  Will increase Tresiba to 70 units subQ daily.  Continue glipizide 10 mg extended release p.o. twice daily with meals.  Continue metformin a 1000 mg p.o. b.i.d. daily.  Restart Mounjaro 2.5 mg subQ every 7 days.  Educated patient fasting blood glucose goal should be between   Blood glucose 2 hours after eating should be less than 180  Educated patient carbohydrate maximum amount should be no more than 50 g per meal and snacks should be less than 15 g per snack but no more than 2 snacks a day  Educated on serving size and reading food labels  Start exercising at least 30 minutes a day up to 5 days a week as simple as brisk walking.  Continue Dexcom G7   Stay hydrated with water.  Avoid sugary drinks.  Get plenty of sleep at least 7-8 hours a night.    Follow low-fat, low-cholesterol diet   Keep fiber intake between 25-30 g per day  Patient to read discharge education materials.  Offered Home health to help patient organize medications and set up reminders.  Patient to think about it.  Patient to return to clinic on July 10, 2025 for continuum of care and Dexcom  reading.  Questions solicited and answered, patient verbalized understanding and agreed to plan of care

## 2025-05-30 NOTE — PROGRESS NOTES
Patient Name: John Addison   : 1955  MRN: [id number]     SUBJECTIVE DATA:    CHIEF COMPLAINT:   John Addison is a 70 y.o. male who presents to clinic today with Follow-up (Type 2 diabetes )        HPI: 70-year-old male presents to endocrine clinic for diabetes management.  Past medical history pulmonary nodules, COPD, peripheral arterial disease, hypertension, hyperlipidemia, smoker  Patient accompanied by his daughter-in-law.     Current medications Mounjaro 2.5 mg subQ every 7 days, metformin a 1000 mg p.o. b.i.d., insulin Tresiba 64 units subQ nightly, glipizide 10 mg extended release twice daily with meals        2025 C-peptide 3.6, florencia 65 0.00, BUN 27 creatinine 0.99, glucose 188,e GFR >60    In clinic 2025  mg/dL              Dexcom interpretation 2025 - 2025, Days with CGM data  days.  GM I 9.1  %.  Average glucose 240 mg/dL.  Time in range 18 %, very high 45%,  high 37 % ,  low 0 %, <0 % very low.  On patient's best day average glucose 197 mg/dL with 42 % in range.  Insulin data is lacking limiting interpretation at times.  Patient not on prandial insulin only on long acting insulin.  Patient currently on glipizide 10 mg extended release p.o. twice daily.  Patient does have prandials spikes above 250 mg/dL because he endorses eating large amount of carbohydrates.  Also patient endorses eating early mornings as well consuming soft drinks and sugary drinks all day.  At times blood glucose response well when he takes his medications.  Patient endorses at times he forget to take his medicine.         Care Plan:   Hemoglobin A1c blood goal is less than 7%  Will increase Tresiba to 70 units subQ daily.  Continue glipizide 10 mg extended release p.o. twice daily with meals.  Continue metformin a 1000 mg p.o. b.i.d. daily.  Restart Mounjaro 2.5 mg subQ every 7 days.  Educated patient fasting blood glucose goal should be between   Blood glucose 2 hours after  "eating should be less than 180  Educated patient carbohydrate maximum amount should be no more than 50 g per meal and snacks should be less than 15 g per snack but no more than 2 snacks a day  Educated on serving size and reading food labels  Start exercising at least 30 minutes a day up to 5 days a week as simple as brisk walking.  Continue Dexcom G7   Stay hydrated with water.  Avoid sugary drinks.  Get plenty of sleep at least 7-8 hours a night.    Follow low-fat, low-cholesterol diet   Keep fiber intake between 25-30 g per day  Patient to read discharge education materials.  Offered Home health to help patient organize medications and set up reminders.  Patient to think about it.  Patient to return to clinic on July 10, 2025 for continuum of care and Dexcom reading.  Questions solicited and answered, patient verbalized understanding and agreed to plan of care        Patient denies chest pain, shortness of breath, dyspnea on exertion, palpitations, peripheral edema, abdominal pain, nausea, vomiting, diarrhea, constipation, fatigue, fever, chills, dysuria,  hematuria, dark stools or bloody stools        ALLERGIES: Review of patient's allergies indicates:  No Known Allergies      ROS:  Review of Systems   All other systems reviewed and are negative.        OBJECTIVE DATA:  Vital signs  Vitals:    05/30/25 0807   BP: 131/80   BP Location: Right arm   Patient Position: Sitting   Pulse: 102   Resp: 18   Temp: 97.9 °F (36.6 °C)   TempSrc: Oral   Weight: 115.2 kg (254 lb 1.3 oz)   Height: 5' 10" (1.778 m)      Body mass index is 36.46 kg/m².    PHYSICAL EXAM:   Physical Exam  Vitals and nursing note reviewed.   Constitutional:       General: He is awake. He is not in acute distress.     Appearance: Normal appearance. He is well-developed and well-groomed. He is obese. He is not ill-appearing, toxic-appearing or diaphoretic.   HENT:      Head: Normocephalic and atraumatic.      Right Ear: External ear normal.      Left " Ear: External ear normal.      Nose: Nose normal.      Mouth/Throat:      Lips: Pink.      Mouth: Mucous membranes are moist.   Eyes:      General: Lids are normal. Gaze aligned appropriately.      Extraocular Movements: Extraocular movements intact.      Pupils: Pupils are equal, round, and reactive to light.   Cardiovascular:      Rate and Rhythm: Normal rate and regular rhythm.      Pulses: Normal pulses.           Radial pulses are 2+ on the right side and 2+ on the left side.      Heart sounds: Normal heart sounds.   Pulmonary:      Effort: Pulmonary effort is normal.      Breath sounds: Normal breath sounds and air entry.   Abdominal:      Palpations: Abdomen is soft.   Musculoskeletal:         General: Normal range of motion.      Cervical back: Normal range of motion.   Skin:     General: Skin is warm.      Capillary Refill: Capillary refill takes less than 2 seconds.   Neurological:      General: No focal deficit present.      Mental Status: He is alert and oriented to person, place, and time. Mental status is at baseline.      GCS: GCS eye subscore is 4. GCS verbal subscore is 5. GCS motor subscore is 6.      Cranial Nerves: No cranial nerve deficit.      Sensory: No sensory deficit.      Motor: No weakness.      Coordination: Coordination normal.      Gait: Gait (Utilizing a cane to ambulate/diabetic neuropathy) normal.   Psychiatric:         Mood and Affect: Mood normal.         Behavior: Behavior normal. Behavior is cooperative.         Thought Content: Thought content normal.         Judgment: Judgment normal.          ASSESSMENT/PLAN:  1. Type 2 diabetes mellitus with hyperglycemia, with long-term current use of insulin  Assessment & Plan:  Dexcom interpretation 05/17/2025 - 05/30/2025, Days with CGM data 11/14 days.  GM I 9.1  %.  Average glucose 240 mg/dL.  Time in range 18 %, very high 45%,  high 37 % ,  low 0 %, <0 % very low.  On patient's best day average glucose 197 mg/dL with 42 % in range.   Insulin data is lacking limiting interpretation at times.  Patient not on prandial insulin only on long acting insulin.  Patient currently on glipizide 10 mg extended release p.o. twice daily.  Patient does have prandials spikes above 250 mg/dL because he endorses eating large amount of carbohydrates.  Also patient endorses eating early mornings as well consuming soft drinks and sugary drinks all day.  At times blood glucose response well when he takes his medications.  Patient endorses at times he forget to take his medicine.         Care Plan:   Hemoglobin A1c blood goal is less than 7%  Will increase Tresiba to 70 units subQ daily.  Continue glipizide 10 mg extended release p.o. twice daily with meals.  Continue metformin a 1000 mg p.o. b.i.d. daily.  Restart Mounjaro 2.5 mg subQ every 7 days.  Educated patient fasting blood glucose goal should be between   Blood glucose 2 hours after eating should be less than 180  Educated patient carbohydrate maximum amount should be no more than 50 g per meal and snacks should be less than 15 g per snack but no more than 2 snacks a day  Educated on serving size and reading food labels  Start exercising at least 30 minutes a day up to 5 days a week as simple as brisk walking.  Continue Dexcom G7   Stay hydrated with water.  Avoid sugary drinks.  Get plenty of sleep at least 7-8 hours a night.    Follow low-fat, low-cholesterol diet   Keep fiber intake between 25-30 g per day  Patient to read discharge education materials.  Offered Home health to help patient organize medications and set up reminders.  Patient to think about it.  Patient to return to clinic on July 10, 2025 for continuum of care and Dexcom reading.  Questions solicited and answered, patient verbalized understanding and agreed to plan of care      Orders:  -     POCT Glucose, Hand-Held Device  -     tirzepatide (MOUNJARO) 2.5 mg/0.5 mL PnIj; Inject 2.5 mg into the skin every 7 days.  Dispense: 4 Pen;  Refill: 1           RESULTS:  Recent Results (from the past 6 weeks)   C-Peptide    Collection Time: 04/25/25  9:12 AM   Result Value Ref Range    C-Peptide, S 3.6 1.1 - 4.4 ng/mL   Glutamic Acid Decarboxylase    Collection Time: 04/25/25  9:12 AM   Result Value Ref Range    GAD65 Ab Assay, S 0.00 <=0.02 nmol/L   Renal Function Panel    Collection Time: 04/25/25  9:12 AM   Result Value Ref Range    Sodium 137 136 - 145 mmol/L    Potassium 4.5 3.5 - 5.1 mmol/L    Chloride 104 98 - 107 mmol/L    CO2 27 23 - 31 mmol/L    Glucose 188 (H) 82 - 115 mg/dL    Blood Urea Nitrogen 27.0 (H) 8.4 - 25.7 mg/dL    Creatinine 0.99 0.72 - 1.25 mg/dL    Calcium 9.2 8.8 - 10.0 mg/dL    Albumin 3.2 (L) 3.4 - 4.8 g/dL    Phosphorus Level 2.9 2.3 - 4.7 mg/dL    eGFR >60 mL/min/1.73/m2   Comprehensive Metabolic Panel    Collection Time: 05/13/25  9:23 AM   Result Value Ref Range    Sodium 140 136 - 145 mmol/L    Potassium 4.7 3.5 - 5.1 mmol/L    Chloride 107 98 - 107 mmol/L    CO2 27 23 - 31 mmol/L    Glucose 191 (H) 82 - 115 mg/dL    Blood Urea Nitrogen 29.0 (H) 8.4 - 25.7 mg/dL    Creatinine 1.21 0.72 - 1.25 mg/dL    Calcium 9.1 8.8 - 10.0 mg/dL    Protein Total 7.6 5.8 - 7.6 gm/dL    Albumin 3.1 (L) 3.4 - 4.8 g/dL    Globulin 4.5 (H) 2.4 - 3.5 gm/dL    Albumin/Globulin Ratio 0.7 (L) 1.1 - 2.0 ratio    Bilirubin Total 0.5 <=1.5 mg/dL     40 - 150 unit/L    ALT 36 0 - 55 unit/L    AST 22 11 - 45 unit/L    eGFR >60 mL/min/1.73/m2    Anion Gap 6.0 mEq/L    BUN/Creatinine Ratio 24    POCT Glucose, Hand-Held Device    Collection Time: 05/30/25  8:25 AM   Result Value Ref Range    POC Glucose 100 70 - 110 MG/DL         Follow Up:  Follow up in about 6 weeks (around 7/10/2025).     42 minutes of total time spent on the encounter, which includes face to face time and non-face to face time preparing to see the patient (eg, review of tests), Obtaining and/or reviewing separately obtained history, Documenting clinical information in the  electronic or other health record, Independently interpreting results (not separately reported) and communicating results to the patient/family/caregiver, or Care coordination (not separately reported).      Previous medical history/lab work/radiology reviewed and considered during medical management decisions.   Medication list reviewed and medication reconciliation performed.  Patient was provided  and care about his/her current diagnosis (es) and medications including risk/benefit and side effects/adverse events, over the counter medication uses/doses, home self-care and contact precautions,  and red flags and indications for when to seek immediate medical attention.   Patient was advised to continue compliance with current medication list and medical recommendations.  Patient dvised continued compliance with recommended eating habits/ diets for medical conditions and exercise 150 minutes/ week (if possible) for medical condition (s).  Educational handouts and instructions on selected disease management in AVS (After Visit Summary).    All of the patient's questions were answered to patient's satisfaction.   The patient was receptive, expressed verbal understanding and agreement the above plan.          This note was created with the assistance of a voice recognition software or phone dictation. There may be transcription errors as a result of using this technology however minimal. Effort has been made to assure accuracy of transcription but any obvious errors or omissions should be clarified with the author of the document

## 2025-06-17 ENCOUNTER — TELEPHONE (OUTPATIENT)
Dept: ENDOCRINOLOGY | Facility: CLINIC | Age: 70
End: 2025-06-17
Payer: MEDICARE

## 2025-06-17 DIAGNOSIS — Z79.4 TYPE 2 DIABETES MELLITUS WITH HYPERGLYCEMIA, WITH LONG-TERM CURRENT USE OF INSULIN: ICD-10-CM

## 2025-06-17 DIAGNOSIS — E11.65 TYPE 2 DIABETES MELLITUS WITH HYPERGLYCEMIA, WITH LONG-TERM CURRENT USE OF INSULIN: ICD-10-CM

## 2025-06-17 RX ORDER — INSULIN DEGLUDEC 200 U/ML
70 INJECTION, SOLUTION SUBCUTANEOUS NIGHTLY
Qty: 10 ML | Refills: 1 | Status: SHIPPED | OUTPATIENT
Start: 2025-06-17

## 2025-06-17 NOTE — TELEPHONE ENCOUNTER
Daughter came into clinic requesting a new prescription for Tresiba, since it was increased to 70 Units. New prescription sent to preferred pharmacy, per Ms. Rosas.     Mr. Addison's daughter stated that Mr. Addison received both Trulicity and Mounjaro and was told to stop Trulicity and start Mounjaro by provider. She requested to cancel  prescription for Trulicity. Called pharmacy and spoke with [facility] regarding canceling prescription for Trulicity. Prescription for Trulicity discontinued, per Ms. Londono.

## 2025-07-10 ENCOUNTER — OFFICE VISIT (OUTPATIENT)
Dept: ENDOCRINOLOGY | Facility: CLINIC | Age: 70
End: 2025-07-10
Payer: MEDICARE

## 2025-07-10 VITALS
DIASTOLIC BLOOD PRESSURE: 87 MMHG | TEMPERATURE: 98 F | HEART RATE: 97 BPM | SYSTOLIC BLOOD PRESSURE: 144 MMHG | BODY MASS INDEX: 36.97 KG/M2 | HEIGHT: 70 IN | WEIGHT: 258.25 LBS | RESPIRATION RATE: 18 BRPM

## 2025-07-10 DIAGNOSIS — Z79.4 TYPE 2 DIABETES MELLITUS WITH HYPERGLYCEMIA, WITH LONG-TERM CURRENT USE OF INSULIN: Primary | ICD-10-CM

## 2025-07-10 DIAGNOSIS — E11.65 TYPE 2 DIABETES MELLITUS WITH HYPERGLYCEMIA, WITH LONG-TERM CURRENT USE OF INSULIN: Primary | ICD-10-CM

## 2025-07-10 DIAGNOSIS — E11.40 TYPE 2 DIABETES MELLITUS WITH DIABETIC NEUROPATHY, WITHOUT LONG-TERM CURRENT USE OF INSULIN: ICD-10-CM

## 2025-07-10 LAB — HBA1C MFR BLD: 11.4 %

## 2025-07-10 PROCEDURE — 99215 OFFICE O/P EST HI 40 MIN: CPT | Mod: PBBFAC | Performed by: NURSE PRACTITIONER

## 2025-07-10 PROCEDURE — 83036 HEMOGLOBIN GLYCOSYLATED A1C: CPT | Mod: PBBFAC | Performed by: NURSE PRACTITIONER

## 2025-07-10 RX ORDER — METFORMIN HYDROCHLORIDE 1000 MG/1
1000 TABLET ORAL 2 TIMES DAILY WITH MEALS
Qty: 180 TABLET | Refills: 1 | Status: SHIPPED | OUTPATIENT
Start: 2025-07-10

## 2025-07-10 RX ORDER — DULAGLUTIDE 4.5 MG/.5ML
INJECTION, SOLUTION SUBCUTANEOUS
COMMUNITY
Start: 2025-06-13 | End: 2025-07-10

## 2025-07-10 RX ORDER — GLIPIZIDE 10 MG/1
10 TABLET, FILM COATED, EXTENDED RELEASE ORAL 2 TIMES DAILY WITH MEALS
Qty: 180 TABLET | Refills: 1 | Status: SHIPPED | OUTPATIENT
Start: 2025-07-10

## 2025-07-10 RX ORDER — INSULIN DEGLUDEC 200 U/ML
70 INJECTION, SOLUTION SUBCUTANEOUS NIGHTLY
Qty: 15 ML | Refills: 2 | Status: SHIPPED | OUTPATIENT
Start: 2025-07-10

## 2025-07-10 RX ORDER — TIRZEPATIDE 5 MG/.5ML
5 INJECTION, SOLUTION SUBCUTANEOUS
Qty: 2 ML | Refills: 2 | Status: SHIPPED | OUTPATIENT
Start: 2025-07-10

## 2025-07-10 NOTE — ASSESSMENT & PLAN NOTE
Dexcom interpretation 06/20/2025 - 07/03/2025. Days with CGM data 5 /14 days.  GM I N/A %.  Average glucose 289 mg/dL.  Time in range 5 %, very high 72 %,  high 23% ,  low 0 %, < 1% very low.  On patient's best day average glucose 276  mg/dL with 9 % in range.  Insulin data is lacking limiting interpretation at times.  Patient blood glucose continue to be elevated above 180 mg/dL throughout the day. Patient state usually put 5-6 packs of sugar in his coffee.  He has sandwich consists of 3 slices of bread.  Likes to eat a long rice and potatoes almost every day.  Denies any physical activity.       Care Plan:  Hemoglobin A1c blood goal is less than 7%, current hemoglobin A1c at 11.4% slight improvement from last visit in May.    Will increase Mounjaro 2 5 mg subQ every 7 days.  Patient to continue with:  metformin a 1000 mg p.o. b.i.d., insulin Tresiba 70 units subQ Daily, glipizide 10 mg extended release twice daily with meals    Educated patient fasting blood glucose goal should be between   Blood glucose 2 hours after eating should be less than 180    Cut carbohydrate intake into half.  Eat 2 slices of bread instead of 3.  Eat Less rice , less potatoes eat more fiber.    Educated patient carbohydrate  amount should be no more than 50 g per meal and snacks should be less than 15 g per snack but no more than 2 snacks a day help with blood glucose management and weight management.  Educated on serving size and reading food labels  Continue Dexcom G7   Start exercising at least 30 minutes a day up to 5 days a week as simple as brisk walking.  Advance as tolerated  Control high blood pressure, goal blood pressures less than 130/80  avoid excessive use of NSAIDs (ibuprofen, naproxen, Aleve, Advil, Toradol, Mobic), take Tylenol as needed for headache or mild pain   take cholesterol lowering medications if prescribed (LDL goal less than 100).   Stay hydrated with water.  Avoid sugary drinks.  Get plenty of sleep at  least 7-8 hours a night.    Follow low-fat, low-cholesterol diet   Keep fiber intake between 25-30 g per day  Patient to read discharge education materials.  Return to September 2025  Questions solicited and answered, patient verbalized understanding and agreed to plan of care

## 2025-07-10 NOTE — PROGRESS NOTES
Patient Name: John Addison   : 1955  MRN: [id number]     SUBJECTIVE DATA:    CHIEF COMPLAINT:   John Addison is a 70 y.o. male who presents to clinic today with Follow-up (Type 2 diabetes )        HPI: 70-year-old male presents to endocrine clinic for diabetes management.  Past medical history pulmonary nodules, COPD, peripheral arterial disease, hypertension, hyperlipidemia, smoker  Patient accompanied by his daughter-in-law.     Current medications Mounjaro 2.5 mg subQ every 7 days, metformin a 1000 mg p.o. b.i.d., insulin Tresiba 64 units subQ nightly, glipizide 10 mg extended release twice daily with meals           Endocrinology clinic visit note 2025:      2025 C-peptide 3.6, florencia 65 0.00, BUN 27 creatinine 0.99, glucose 188,e GFR >60     In clinic 2025  mg/dL                   Dexcom interpretation 2025 - 2025, Days with CGM data  days.  GM I 9.1  %.  Average glucose 240 mg/dL.  Time in range 18 %, very high 45%,  high 37 % ,  low 0 %, <0 % very low.  On patient's best day average glucose 197 mg/dL with 42 % in range.  Insulin data is lacking limiting interpretation at times.  Patient not on prandial insulin only on long acting insulin.  Patient currently on glipizide 10 mg extended release p.o. twice daily.  Patient does have prandials spikes above 250 mg/dL because he endorses eating large amount of carbohydrates.  Also patient endorses eating early mornings as well consuming soft drinks and sugary drinks all day.  At times blood glucose response well when he takes his medications.  Patient endorses at times he forget to take his medicine.           Care Plan:   Hemoglobin A1c blood goal is less than 7%, current 12.6%  Will increase Tresiba to 70 units subQ daily.  Continue glipizide 10 mg extended release p.o. twice daily with meals.  Continue metformin a 1000 mg p.o. b.i.d. daily.  Restart Mounjaro 2.5 mg subQ every 7 days.  Educated patient fasting  blood glucose goal should be between   Blood glucose 2 hours after eating should be less than 180  Educated patient carbohydrate maximum amount should be no more than 50 g per meal and snacks should be less than 20 g per snack but no more than 2 snacks a day  Educated on serving size and reading food labels  Start exercising at least 30 minutes a day up to 5 days a week as simple as brisk walking.  Continue Dexcom G7   Stay hydrated with water.  Avoid sugary drinks.  Get plenty of sleep at least 7-8 hours a night.    Follow low-fat, low-cholesterol diet   Keep fiber intake between 25-30 g per day  Patient to read discharge education materials.  Offered Home health to help patient organize medications and set up reminders.  Patient to think about it.  Patient to return to clinic on July 10, 2025 for continuum of care and Dexcom reading.  Questions solicited and answered, patient verbalized understanding and agreed to plan of care            Endocrinology clinic visit note 07/10/2025: 70-year-old male presents to endocrine clinic for diabetes management.  Past medical history pulmonary nodules, COPD, peripheral arterial disease, hypertension, hyperlipidemia, smoker      Current medications:  Mounjaro 2.5 mg subQ every 7 days, metformin a 1000 mg p.o. b.i.d., insulin Tresiba 70 units subQ Daily, glipizide 10 mg extended release twice daily with meals    04/25/2025 C-peptide 3.6, florencia 65 0.00, BUN 27 creatinine 0.99, glucose 188,e GFR >60              Dexcom interpretation 06/20/2025 - 07/03/2025. Days with CGM data 5 /14 days.  GM I N/A %.  Average glucose 289 mg/dL.  Time in range 5 %, very high 72 %,  high 23% ,  low 0 %, < 1% very low.  On patient's best day average glucose 276  mg/dL with 9 % in range.  Insulin data is lacking limiting interpretation at times.  Patient blood glucose continue to be elevated above 180 mg/dL throughout the day. Patient state usually put 5-6 packs of sugar in his coffee.  He  usually a sandwich consists of 3 slices of bread.  Likes to eat a lot of rice and potatoes almost every day.  Denies any physical activity.       Care Plan:  Hemoglobin A1c blood goal is less than 7%, current hemoglobin A1c at 11.4% slight improvement from last visit in May.    Will increase Mounjaro to 5 mg subQ every 7 days.  Patient to continue with:  metformin a 1000 mg p.o. b.i.d.,  insulin Tresiba 70 units subQ Daily  glipizide 10 mg extended release twice daily with meals    Educated patient fasting blood glucose goal should be between   Blood glucose 2 hours after eating should be less than 180    Cut carbohydrate intake into half.  Eat 2 slices of bread instead of 3.  Eat Less rice , less potatoes eat more fiber.    Educated patient carbohydrate  amount should be no more than 50 g per meal and snacks should be less than 20 g per snack but no more than 2 snacks a day help with blood glucose management and weight management.  Educated on serving size and reading food labels  Continue Dexcom G7   Start exercising at least 30 minutes a day up to 5 days a week as simple as brisk walking.  Advance as tolerated  Control high blood pressure, goal blood pressures less than 130/80  avoid excessive use of NSAIDs (ibuprofen, naproxen, Aleve, Advil, Toradol, Mobic), take Tylenol as needed for headache or mild pain   take cholesterol lowering medications if prescribed (LDL goal less than 100).   Stay hydrated with water.  Avoid sugary drinks.  Get plenty of sleep at least 7-8 hours a night.    Follow low-fat, low-cholesterol diet   Keep fiber intake between 25-30 g per day  Patient to read discharge education materials.  Return to September 2025  Questions solicited and answered, patient verbalized understanding and agreed to plan of care    Patient denies chest pain, shortness of breath, dyspnea on exertion, palpitations, peripheral edema, abdominal pain, nausea, vomiting, diarrhea, constipation, fatigue, fever,  "chills, dysuria,  hematuria.        ALLERGIES: Review of patient's allergies indicates:  No Known Allergies      ROS:  Review of Systems   All other systems reviewed and are negative.        OBJECTIVE DATA:  Vital signs  Vitals:    07/10/25 0811 07/10/25 0851   BP: (!) 150/87 (!) 144/87   BP Location: Right arm    Patient Position: Sitting    Pulse: 97    Resp: 18    Temp: 97.8 °F (36.6 °C)    TempSrc: Oral    Weight: 117.1 kg (258 lb 4.3 oz)    Height: 5' 10" (1.778 m)       Body mass index is 37.06 kg/m².    PHYSICAL EXAM:   Physical Exam  Vitals and nursing note reviewed.   Constitutional:       General: He is awake. He is not in acute distress.     Appearance: Normal appearance. He is well-developed and well-groomed. He is obese. He is not ill-appearing, toxic-appearing or diaphoretic.   HENT:      Head: Normocephalic and atraumatic.      Right Ear: External ear normal.      Left Ear: External ear normal.      Nose: Nose normal.      Mouth/Throat:      Lips: Pink.      Mouth: Mucous membranes are moist.   Eyes:      General: Gaze aligned appropriately.      Extraocular Movements: Extraocular movements intact.      Pupils: Pupils are equal, round, and reactive to light.   Cardiovascular:      Rate and Rhythm: Normal rate and regular rhythm.      Pulses: Normal pulses.           Radial pulses are 2+ on the right side and 2+ on the left side.      Heart sounds: Normal heart sounds.   Pulmonary:      Effort: Pulmonary effort is normal.      Breath sounds: Normal breath sounds and air entry.   Abdominal:      General: Abdomen is flat.      Palpations: Abdomen is soft.   Musculoskeletal:         General: Normal range of motion.      Cervical back: Normal range of motion.   Skin:     General: Skin is warm.      Capillary Refill: Capillary refill takes less than 2 seconds.   Neurological:      General: No focal deficit present.      Mental Status: He is alert and oriented to person, place, and time. Mental status is at " baseline.      GCS: GCS eye subscore is 4. GCS verbal subscore is 5. GCS motor subscore is 6.      Cranial Nerves: No cranial nerve deficit.      Sensory: No sensory deficit.      Motor: No weakness.      Coordination: Coordination normal.      Gait: Gait normal.   Psychiatric:         Mood and Affect: Mood normal.         Behavior: Behavior normal. Behavior is cooperative.         Thought Content: Thought content normal.         Judgment: Judgment normal.          ASSESSMENT/PLAN:  1. Type 2 diabetes mellitus with hyperglycemia, with long-term current use of insulin  Assessment & Plan:  Dexcom interpretation 06/20/2025 - 07/03/2025. Days with CGM data 5 /14 days.  GM I N/A %.  Average glucose 289 mg/dL.  Time in range 5 %, very high 72 %,  high 23% ,  low 0 %, < 1% very low.  On patient's best day average glucose 276  mg/dL with 9 % in range.  Insulin data is lacking limiting interpretation at times.  Patient blood glucose continue to be elevated above 180 mg/dL throughout the day. Patient state usually put 5-6 packs of sugar in his coffee.  He has sandwich consists of 3 slices of bread.  Likes to eat a long rice and potatoes almost every day.  Denies any physical activity.       Care Plan:  Hemoglobin A1c blood goal is less than 7%, current hemoglobin A1c at 11.4% slight improvement from last visit in May.    Will increase Mounjaro 2 5 mg subQ every 7 days.  Patient to continue with:  metformin a 1000 mg p.o. b.i.d., insulin Tresiba 70 units subQ Daily, glipizide 10 mg extended release twice daily with meals    Educated patient fasting blood glucose goal should be between   Blood glucose 2 hours after eating should be less than 180    Cut carbohydrate intake into half.  Eat 2 slices of bread instead of 3.  Eat Less rice , less potatoes eat more fiber.    Educated patient carbohydrate  amount should be no more than 50 g per meal and snacks should be less than 15 g per snack but no more than 2 snacks a day  - Continue with po analgesia  - Increase ambulation  - Continue regular diet  - IV lock  - No labs  - Patient desires micronor for contraception postpartum    [facility] help with blood glucose management and weight management.  Educated on serving size and reading food labels  Continue Dexcom G7   Start exercising at least 30 minutes a day up to 5 days a week as simple as brisk walking.  Advance as tolerated  Control high blood pressure, goal blood pressures less than 130/80  avoid excessive use of NSAIDs (ibuprofen, naproxen, Aleve, Advil, Toradol, Mobic), take Tylenol as needed for headache or mild pain   take cholesterol lowering medications if prescribed (LDL goal less than 100).   Stay hydrated with water.  Avoid sugary drinks.  Get plenty of sleep at least 7-8 hours a night.    Follow low-fat, low-cholesterol diet   Keep fiber intake between 25-30 g per day  Patient to read discharge education materials.  Return to September 2025  Questions solicited and answered, patient verbalized understanding and agreed to plan of care    Orders:  -     Hemoglobin A1C, POCT  -     tirzepatide (MOUNJARO) 5 mg/0.5 mL PnIj; Inject 5 mg into the skin every 7 days.  Dispense: 2 mL; Refill: 2  -     insulin degludec (TRESIBA FLEXTOUCH U-200) 200 unit/mL (3 mL) insulin pen; Inject 70 Units into the skin every evening.  Dispense: 15 mL; Refill: 2  -     glipiZIDE (GLUCOTROL) 10 MG TR24; Take 1 tablet (10 mg total) by mouth 2 (two) times daily with meals.  Dispense: 180 tablet; Refill: 1  -     metFORMIN (GLUCOPHAGE) 1000 MG tablet; Take 1 tablet (1,000 mg total) by mouth 2 (two) times daily with meals.  Dispense: 180 tablet; Refill: 1    2. Type 2 diabetes mellitus with diabetic neuropathy, without long-term current use of insulin  -     glipiZIDE (GLUCOTROL) 10 MG TR24; Take 1 tablet (10 mg total) by mouth 2 (two) times daily with meals.  Dispense: 180 tablet; Refill: 1  -     metFORMIN (GLUCOPHAGE) 1000 MG tablet; Take 1 tablet (1,000 mg total) by mouth 2 (two) times daily with meals.  Dispense: 180 tablet; Refill: 1           RESULTS:  Recent Results (from the past 6 weeks)   POCT Glucose,  Hand-Held Device    Collection Time: 05/30/25  8:25 AM   Result Value Ref Range    POC Glucose 100 70 - 110 MG/DL   Hemoglobin A1C, POCT    Collection Time: 07/10/25  8:26 AM   Result Value Ref Range    Hemoglobin A1C, POC 11.4 %         Follow Up:  Follow up in about 10 weeks (around 9/18/2025).      Previous medical history/lab work/radiology reviewed and considered during medical management decisions.   Medication list reviewed and medication reconciliation performed.  Patient was provided  and care about his/her current diagnosis (es) and medications including risk/benefit and side effects/adverse events, over the counter medication uses/doses, home self-care and contact precautions,  and red flags and indications for when to seek immediate medical attention.   Patient was advised to continue compliance with current medication list and medical recommendations.  Patient dvised continued compliance with recommended eating habits/ diets for medical conditions and exercise 150 minutes/ week (if possible) for medical condition (s).  Educational handouts and instructions on selected disease management in AVS (After Visit Summary).    All of the patient's questions were answered to patient's satisfaction.   The patient was receptive, expressed verbal understanding and agreement the above plan.          This note was created with the assistance of a voice recognition software or phone dictation. There may be transcription errors as a result of using this technology however minimal. Effort has been made to assure accuracy of transcription but any obvious errors or omissions should be clarified with the author of the document

## 2025-07-15 ENCOUNTER — PATIENT MESSAGE (OUTPATIENT)
Facility: CLINIC | Age: 70
End: 2025-07-15
Payer: MEDICARE

## 2025-07-30 ENCOUNTER — OFFICE VISIT (OUTPATIENT)
Dept: CARDIOLOGY | Facility: CLINIC | Age: 70
End: 2025-07-30
Payer: MEDICARE

## 2025-07-30 VITALS
DIASTOLIC BLOOD PRESSURE: 70 MMHG | WEIGHT: 249.81 LBS | RESPIRATION RATE: 18 BRPM | BODY MASS INDEX: 35.76 KG/M2 | HEART RATE: 88 BPM | HEIGHT: 70 IN | OXYGEN SATURATION: 96 % | SYSTOLIC BLOOD PRESSURE: 135 MMHG

## 2025-07-30 DIAGNOSIS — I73.9 PERIPHERAL ARTERY DISEASE: Primary | ICD-10-CM

## 2025-07-30 DIAGNOSIS — F17.219 CIGARETTE NICOTINE DEPENDENCE WITH NICOTINE-INDUCED DISORDER: ICD-10-CM

## 2025-07-30 DIAGNOSIS — I10 PRIMARY HYPERTENSION: ICD-10-CM

## 2025-07-30 DIAGNOSIS — I25.10 CORONARY ARTERY DISEASE, UNSPECIFIED VESSEL OR LESION TYPE, UNSPECIFIED WHETHER ANGINA PRESENT, UNSPECIFIED WHETHER NATIVE OR TRANSPLANTED HEART: ICD-10-CM

## 2025-07-30 DIAGNOSIS — E78.5 HYPERLIPIDEMIA LDL GOAL <70: ICD-10-CM

## 2025-07-30 PROCEDURE — 99215 OFFICE O/P EST HI 40 MIN: CPT | Mod: PBBFAC

## 2025-07-30 NOTE — PROGRESS NOTES
CHIEF COMPLAINT:   Chief Complaint   Patient presents with    Follow-up     6 mos f/u denies cardiac targets                                                  HPI:  John Addison 70 y.o. male with a PMH significant for CAD/NSTEMI 2/2016 s/p PCI of left circumflex, inferior wall STEMI s/p PTCA/BHAKTI of distal RCA in 9/2017, hypertension, hypercholesterolemia, diabetes, and chronic tobacco who presents to cardiology clinic for follow up and ongoing care.    Today the patient states that he feels well from cardiac standpoint.  He complains of ongoing bilateral lower extremity pain.  It is unclear if this is neuropathic pain secondary to his severely uncontrolled diabetes or if this is claudication pain.  He was previously scheduled to follow up with vascular surgery, but never made it to appointment.  We will re referred today.  He has occasional light headedness and dizziness but denies any near-syncope or actual syncopal episodes.  He states that it typically occurs with orthostasis and changing positions too quickly.  He has occasional SOB/BRYAN that occurs when moving too fast.  Otherwise he denies any chest pain, palpitations, PND, orthopnea.  He is able to complete his ADLs without any issues or ischemic symptoms.  He reports compliance with all his current medications that he states that he is tolerating them well.  He continues to smoke approximately 1/2 pack of cigarettes per day and is not ready to quit.                                                                                                                                                                                                                                                                                                                                                                                                                                                                          CARDIAC TESTING:  Lexiscan Stress Test October 2018:  ECG  portion of stress test is negative for ischemia by diagnostic criteria.  Negative for ischemia. Normal myocardial perfusion study on stress imaging. Normal LVEF 57%.  Recommendation:  Recommend medical management of coronary disease if clinically indicated.    SANIA testing November 2018:  Normal resting SANIA  Normal stress Doppler-study limited by patient's complaint of shortness of breath    Echocardiogram September 20, 2017:  Ejection fraction is visually estimated at 55%  Mild mitral regurgitation  No evidence of pericardial effusion          Patient Active Problem List   Diagnosis    Coronary artery disease    Hypertension    Hyperlipidemia LDL goal <70    Pain in both lower extremities    Abdominal swelling    Pulmonary nodules    Abnormal liver function tests    Anemia    Chronic allergic conjunctivitis    Chronic obstructive pulmonary disease    Current drinker of alcohol    Intra-abdominal lymphadenopathy    Tegmen defect of base of skull    Cigarette nicotine dependence with nicotine-induced disorder    Atypical chest pain    Xerosis of skin    Dystrophic nail    Onychomycosis of multiple toenails with type 2 diabetes mellitus    Avulsion of toenail of left foot    Severe obesity (BMI 35.0-39.9) with comorbidity    Peripheral artery disease    Hyperglycemia due to diabetes mellitus    Type 2 diabetes mellitus with hyperglycemia, with long-term current use of insulin    Hearing loss    BPH (benign prostatic hyperplasia)    Chronic kidney disease, stage 3a    Dependent for transportation    Illiteracy and low-level literacy    Medical non-compliance    Nevus    Type 2 diabetes mellitus with diabetic neuropathy, with long-term current use of insulin     Past Surgical History:   Procedure Laterality Date    CORONARY ANGIOPLASTY      EXCISION, LESION, LOWER EXTREMITY Left 5/25/2023    Procedure: EXCISION, LESION, LOWER EXTREMITY;  Surgeon: South Terry MD;  Location: [facility];  Service: General;  Laterality:  Left;    TONSILLECTOMY  07/2018    WOUND DEBRIDEMENT N/A 5/10/2023    Procedure: DEBRIDEMENT, WOUND;  Surgeon: Smith Lay MD;  Location: [facility];  Service: General;  Laterality: N/A;  perineum debridement, lithotomy     Social History     Socioeconomic History    Marital status: Single   Tobacco Use    Smoking status: Every Day     Current packs/day: 0.50     Average packs/day: 0.5 packs/day for 61.2 years (30.6 ttl pk-yrs)     Types: Cigarettes     Start date: 5/8/1964     Passive exposure: Current    Smokeless tobacco: Never   Substance and Sexual Activity    Alcohol use: Yes     Comment: beer 2x/month    Drug use: Yes     Types: Marijuana    Sexual activity: Not Currently     Social Drivers of Health     Financial Resource Strain: Low Risk  (5/19/2023)    Overall Financial Resource Strain (CARDIA)     Difficulty of Paying Living Expenses: Not hard at all   Food Insecurity: No Food Insecurity (5/19/2023)    Hunger Vital Sign     Worried About Running Out of Food in the Last Year: Never true     Ran Out of Food in the Last Year: Never true   Transportation Needs: No Transportation Needs (5/19/2023)    PRAPARE - Transportation     Lack of Transportation (Medical): No     Lack of Transportation (Non-Medical): No   Physical Activity: Inactive (5/19/2023)    Exercise Vital Sign     Days of Exercise per Week: 0 days     Minutes of Exercise per Session: 0 min   Stress: No Stress Concern Present (5/19/2023)    [employer] - Occupational Stress Questionnaire     Feeling of Stress : Not at all   Housing Stability: Low Risk  (5/19/2023)    Housing Stability Vital Sign     Unable to Pay for Housing in the Last Year: No     Number of Places Lived in the Last Year: 1     Unstable Housing in the Last Year: No        Family History   Problem Relation Name Age of Onset    Hypertension Mother      Heart failure Mother      Heart attack Mother      Coronary artery disease Mother      Cancer  "Father      Cancer Sister       Review of patient's allergies indicates:  No Known Allergies      ROS:  Review of Systems   Constitutional:  Negative for malaise/fatigue.   HENT: Negative.     Eyes: Negative.    Respiratory:  Positive for shortness of breath.    Cardiovascular:  Positive for claudication. Negative for chest pain, palpitations, orthopnea, leg swelling and PND.   Gastrointestinal: Negative.    Genitourinary: Negative.    Musculoskeletal: Negative.    Skin: Negative.    Neurological: Negative.  Negative for dizziness and weakness.   Endo/Heme/Allergies: Negative.    Psychiatric/Behavioral: Negative.  The patient is not nervous/anxious.    All other systems reviewed and are negative.                                                                                                                                                                               Negative except as stated in the history of present illness. See HPI for details.    PHYSICAL EXAM:  Visit Vitals  /70 (BP Location: Right arm, Patient Position: Sitting)   Pulse 88   Resp 18   Ht 5' 10" (1.778 m)   Wt 113.3 kg (249 lb 12.8 oz)   SpO2 96%   BMI 35.84 kg/m²         Physical Exam  Vitals reviewed.   Constitutional:       Appearance: Normal appearance. He is obese. He is not ill-appearing.   HENT:      Head: Normocephalic.      Mouth/Throat:      Mouth: Mucous membranes are moist.   Eyes:      Extraocular Movements: Extraocular movements intact.   Neck:      Vascular: No carotid bruit.   Cardiovascular:      Rate and Rhythm: Normal rate and regular rhythm.      Pulses: Normal pulses.      Heart sounds: Normal heart sounds.   Pulmonary:      Effort: Pulmonary effort is normal.   Abdominal:      General: There is no distension.   Musculoskeletal:         General: Normal range of motion.      Right lower leg: No edema.      Left lower leg: No edema.   Skin:     General: Skin is warm and dry.   Neurological:      General: No focal " "deficit present.      Mental Status: He is alert and oriented to person, place, and time.   Psychiatric:         Mood and Affect: Mood normal.         Behavior: Behavior normal.         Current Outpatient Medications   Medication Instructions    albuterol (PROVENTIL/VENTOLIN HFA) 90 mcg/actuation inhaler 2 puffs, Inhalation, Every 6 hours PRN    aspirin 81 mg, Oral, Daily    atorvastatin (LIPITOR) 80 mg, Oral, Daily    blood sugar diagnostic Strp To check BG three times daily, to use with insurance preferred meter    blood-glucose meter kit To check BG three times daily, to use with insurance preferred meter    blood-glucose sensor (DEXCOM G7 SENSOR) Alysha Change every 10 days    blood-glucose,,cont (DEXCOM G7 ) Misc Dexcom G7  uses directed    clopidogreL (PLAVIX) 75 mg, Oral, Daily    famotidine (PEPCID) 20 mg, Oral, 2 times daily    ferrous sulfate 324 mg, Oral, Daily    finasteride (PROSCAR) 5 mg, Oral, Daily    fluticasone-salmeterol diskus inhaler 250-50 mcg 1 puff, Inhalation, 2 times daily, Controller    folic acid (FOLVITE) 1 mg, Oral, Daily    gabapentin (NEURONTIN) 300 mg, Oral, 3 times daily PRN    glipiZIDE (GLUCOTROL) 10 mg, Oral, 2 times daily with meals    insulin degludec (TRESIBA FLEXTOUCH U-200) 70 Units, Subcutaneous, Nightly    lancets Misc To check BG three times daily, to use with insurance preferred meter    lisinopriL 10 mg, Oral, Daily    metFORMIN (GLUCOPHAGE) 1,000 mg, Oral, 2 times daily with meals    metoprolol tartrate (LOPRESSOR) 50 mg, Oral, 2 times daily    MOUNJARO 5 mg, Subcutaneous, Every 7 days    mupirocin (BACTROBAN) 2 % ointment Topical (Top), 2 times daily    pen needle, diabetic (BD ULTRA-FINE GOOD PEN NEEDLE) 32 gauge x 5/32" Ndle USE 1 SYRINGE DAILY 100 DAY SUPPLY    sodium bicarbonate 650 mg, Oral, 2 times daily    tamsulosin (FLOMAX) 0.8 mg, Oral, Daily        All medications, laboratory studies, cardiac diagnostic imaging reviewed.     Lab " Results   Component Value Date    TRIG 246 (H) 12/02/2024    TRIG 70 08/28/2023    CREATININE 1.21 05/13/2025    MG 1.80 07/03/2023    K 4.7 05/13/2025        ASSESSMENT/PLAN:    CAD (coronary artery disease)  CAD symptoms have not progressed  Endorses ongoing stable SOB/BRYAN that is his baseline, no worsening or progression since his last visit   Denies CP, heaviness, tightness, dizziness, or syncope  Has occasional lightheadedness and dizziness- appear to be orthostatic   NSTEMI s/p PCI left circumflex in 2/2016  STEMI s/p PTCA/BHAKTI distal RCA on 9/2017   Lexiscan Stress Test October 2018 - negative for ischemia  Continue baby aspirin, plavix, lisinopril, metoprolol, and atorvastatin   Counseled on lifestyle modifications with diet, exercise, and smoking cessation  No indication for repeat testing at this time given lack of symptoms     Left leg pain  Claudication  Ongoing claudication symptoms in bilateral lower extremities   Reports claudication with ambulation. Describes it as a burning, heaviness pain, numbness and tingling   Reports intermittent pain with ambulation. Also has sharp shooting pain at rest that appears to be related to neuropathy (unclear if symptoms related to diabetic neuropathy versus peripheral artery disease)  Arterial Insufficiency US on 10.10.22 reveled moderate arterial flow reduction in right lower extremity and moderately severe arterial flow reduction in the left lower extremity.  Repeat arterial ultrasounds were ordered at prior office visit, however patient did not complete   Patient was also referred to vascular surgery per PCP in September of 2023, however patient did not go to visit   Strongly encouraged patient to complete the arterial insufficiency ultrasounds and to reach out to vascular surgery to make appointment    HTN   Controlled - /70 today   Continue current medications as listed above   Counseled on a low sodium, low cholesterol diet    HLD   LDL at goal - 51 per  labs December 2024  Continue Atorvastatin 80mg by mouth daily  Counseled patient on low-cholesterol, low-fat diet, and encourage exercise as tolerated.    Diabetes  Uncontrolled - last A1c 11.6  Management per PCP    Tobacco Abuse  Continues to smoke approximately 1/2 PPD and is not ready to quit   Counseled on smoking cessation       Follow up in cardiology clinic in 6 months or sooner if needed   Follow up with PCP as directed   Please notify clinic if any new concerns or any change in symptoms

## 2025-08-20 DIAGNOSIS — Z79.4 TYPE 2 DIABETES MELLITUS WITH HYPERGLYCEMIA, WITH LONG-TERM CURRENT USE OF INSULIN: ICD-10-CM

## 2025-08-20 DIAGNOSIS — E11.65 TYPE 2 DIABETES MELLITUS WITH HYPERGLYCEMIA, WITH LONG-TERM CURRENT USE OF INSULIN: ICD-10-CM

## 2025-08-20 DIAGNOSIS — J44.9 CHRONIC OBSTRUCTIVE PULMONARY DISEASE, UNSPECIFIED COPD TYPE: ICD-10-CM

## 2025-08-20 DIAGNOSIS — I10 PRIMARY HYPERTENSION: ICD-10-CM

## 2025-08-20 DIAGNOSIS — E11.40 TYPE 2 DIABETES MELLITUS WITH DIABETIC NEUROPATHY, WITHOUT LONG-TERM CURRENT USE OF INSULIN: ICD-10-CM

## 2025-08-20 DIAGNOSIS — N40.0 BENIGN PROSTATIC HYPERPLASIA, UNSPECIFIED WHETHER LOWER URINARY TRACT SYMPTOMS PRESENT: ICD-10-CM

## 2025-08-20 DIAGNOSIS — E78.5 HYPERLIPIDEMIA LDL GOAL <70: ICD-10-CM

## 2025-08-20 DIAGNOSIS — I25.10 CORONARY ARTERY DISEASE, UNSPECIFIED VESSEL OR LESION TYPE, UNSPECIFIED WHETHER ANGINA PRESENT, UNSPECIFIED WHETHER NATIVE OR TRANSPLANTED HEART: ICD-10-CM

## 2025-08-20 DIAGNOSIS — T30.0 BURN: ICD-10-CM

## 2025-08-20 DIAGNOSIS — D64.9 ANEMIA, UNSPECIFIED TYPE: ICD-10-CM

## 2025-08-26 RX ORDER — GABAPENTIN 300 MG/1
300 CAPSULE ORAL 3 TIMES DAILY PRN
Qty: 30 CAPSULE | Refills: 6 | Status: SHIPPED | OUTPATIENT
Start: 2025-08-26

## 2025-08-26 RX ORDER — CLOPIDOGREL BISULFATE 75 MG/1
75 TABLET ORAL DAILY
Qty: 90 TABLET | Refills: 1 | Status: SHIPPED | OUTPATIENT
Start: 2025-08-26 | End: 2026-08-26

## 2025-08-26 RX ORDER — ATORVASTATIN CALCIUM 80 MG/1
80 TABLET, FILM COATED ORAL DAILY
Qty: 90 TABLET | Refills: 1 | Status: SHIPPED | OUTPATIENT
Start: 2025-08-26 | End: 2026-08-21

## 2025-08-26 RX ORDER — MUPIROCIN 20 MG/G
OINTMENT TOPICAL 2 TIMES DAILY
Qty: 30 G | Refills: 0 | Status: SHIPPED | OUTPATIENT
Start: 2025-08-26

## 2025-08-26 RX ORDER — ALBUTEROL SULFATE 90 UG/1
2 INHALANT RESPIRATORY (INHALATION) EVERY 6 HOURS PRN
Qty: 18 G | Refills: 5 | OUTPATIENT
Start: 2025-08-26

## 2025-08-26 RX ORDER — TAMSULOSIN HYDROCHLORIDE 0.4 MG/1
0.8 CAPSULE ORAL DAILY
Qty: 180 CAPSULE | Refills: 1 | Status: SHIPPED | OUTPATIENT
Start: 2025-08-26 | End: 2026-08-26

## 2025-08-26 RX ORDER — SODIUM BICARBONATE 650 MG/1
650 TABLET ORAL 2 TIMES DAILY
Qty: 60 TABLET | Refills: 0 | OUTPATIENT
Start: 2025-08-26 | End: 2025-09-25

## 2025-08-26 RX ORDER — FLUTICASONE PROPIONATE AND SALMETEROL 250; 50 UG/1; UG/1
1 POWDER RESPIRATORY (INHALATION) 2 TIMES DAILY
Qty: 180 EACH | Refills: 1 | Status: SHIPPED | OUTPATIENT
Start: 2025-08-26 | End: 2026-08-26

## 2025-08-26 RX ORDER — METOPROLOL TARTRATE 50 MG/1
50 TABLET ORAL 2 TIMES DAILY
Qty: 180 TABLET | Refills: 3 | OUTPATIENT
Start: 2025-08-26 | End: 2026-08-26

## 2025-08-26 RX ORDER — BLOOD-GLUCOSE,RECEIVER,CONT
EACH MISCELLANEOUS
Qty: 1 EACH | Refills: 0 | OUTPATIENT
Start: 2025-08-26

## 2025-08-26 RX ORDER — FERROUS SULFATE 324(65)MG
324 TABLET, DELAYED RELEASE (ENTERIC COATED) ORAL DAILY
Qty: 90 TABLET | Refills: 3 | Status: SHIPPED | OUTPATIENT
Start: 2025-08-26

## 2025-08-26 RX ORDER — LISINOPRIL 10 MG/1
10 TABLET ORAL DAILY
Qty: 90 TABLET | Refills: 1 | Status: SHIPPED | OUTPATIENT
Start: 2025-08-26 | End: 2026-08-26

## 2025-08-26 RX ORDER — PEN NEEDLE, DIABETIC 30 GX3/16"
NEEDLE, DISPOSABLE MISCELLANEOUS
Qty: 100 EACH | Refills: 3 | OUTPATIENT
Start: 2025-08-26

## 2025-08-26 RX ORDER — GLIPIZIDE 10 MG/1
10 TABLET, FILM COATED, EXTENDED RELEASE ORAL 2 TIMES DAILY WITH MEALS
Qty: 180 TABLET | Refills: 1 | OUTPATIENT
Start: 2025-08-26

## 2025-08-26 RX ORDER — FOLIC ACID 1 MG/1
1 TABLET ORAL DAILY
Qty: 90 TABLET | Refills: 1 | Status: SHIPPED | OUTPATIENT
Start: 2025-08-26

## 2025-08-26 RX ORDER — LANCETS
EACH MISCELLANEOUS
Qty: 200 EACH | Refills: 5 | Status: SHIPPED | OUTPATIENT
Start: 2025-08-26

## 2025-08-26 RX ORDER — TIRZEPATIDE 5 MG/.5ML
5 INJECTION, SOLUTION SUBCUTANEOUS
Qty: 2 ML | Refills: 2 | OUTPATIENT
Start: 2025-08-26

## 2025-08-26 RX ORDER — BLOOD-GLUCOSE SENSOR
EACH MISCELLANEOUS
Qty: 3 EACH | Refills: 11 | Status: SHIPPED | OUTPATIENT
Start: 2025-08-26

## 2025-08-26 RX ORDER — FINASTERIDE 5 MG/1
5 TABLET, FILM COATED ORAL DAILY
Qty: 90 TABLET | Refills: 1 | Status: SHIPPED | OUTPATIENT
Start: 2025-08-26 | End: 2026-08-26

## 2025-08-26 RX ORDER — INSULIN DEGLUDEC 200 U/ML
70 INJECTION, SOLUTION SUBCUTANEOUS NIGHTLY
Qty: 15 ML | Refills: 2 | OUTPATIENT
Start: 2025-08-26

## 2025-08-26 RX ORDER — FAMOTIDINE 40 MG/1
20 TABLET, FILM COATED ORAL 2 TIMES DAILY
Qty: 180 TABLET | Refills: 1 | Status: SHIPPED | OUTPATIENT
Start: 2025-08-26

## 2025-08-26 RX ORDER — NAPROXEN SODIUM 220 MG/1
81 TABLET, FILM COATED ORAL DAILY
Qty: 90 TABLET | Refills: 3 | Status: SHIPPED | OUTPATIENT
Start: 2025-08-26 | End: 2026-08-26

## 2025-08-26 RX ORDER — METFORMIN HYDROCHLORIDE 1000 MG/1
1000 TABLET ORAL 2 TIMES DAILY WITH MEALS
Qty: 180 TABLET | Refills: 1 | OUTPATIENT
Start: 2025-08-26

## 2025-08-26 RX ORDER — INSULIN PUMP SYRINGE, 3 ML
EACH MISCELLANEOUS
Qty: 1 EACH | Refills: 0 | Status: SHIPPED | OUTPATIENT
Start: 2025-08-26

## 2025-08-27 ENCOUNTER — TELEPHONE (OUTPATIENT)
Dept: ENDOCRINOLOGY | Facility: CLINIC | Age: 70
End: 2025-08-27
Payer: MEDICARE

## (undated) DEVICE — ELECTRODE PATIENT RETURN DISP

## (undated) DEVICE — GLOVE PROTEXIS BLUE LATEX 7

## (undated) DEVICE — DRAPE ORTH SPLIT 77X108IN

## (undated) DEVICE — NDL 27G X 1 1/4

## (undated) DEVICE — NDL SYR 10ML 18X1.5 LL BLUNT

## (undated) DEVICE — BANDAGE ROLL COTTN 4.5INX4.1YD

## (undated) DEVICE — SOL BETADINE 5%

## (undated) DEVICE — YANKAUER FLEX NO VENT REG CAP

## (undated) DEVICE — MANIFOLD 4 PORT

## (undated) DEVICE — DRAPE LEGGINGS CUFF 31X48IN

## (undated) DEVICE — SOLIDIFIER BOTTLE 1500CC

## (undated) DEVICE — TRAY SKIN SCRUB WET PREMIUM

## (undated) DEVICE — CLOSURE SKIN STERI STRIP 1/2X4

## (undated) DEVICE — GLOVE PROTEXIS LTX MICRO 6.5

## (undated) DEVICE — CONTAINER SPECIMEN 4.5OZ

## (undated) DEVICE — Device

## (undated) DEVICE — GLOVE PROTEXIS NEOPRENE 7.5

## (undated) DEVICE — PAD ABDOMINAL STERILE 8X10IN

## (undated) DEVICE — KIT SURGICAL TURNOVER

## (undated) DEVICE — GLOVE PROTEXIS BLUE LATEX 8.5

## (undated) DEVICE — SOL NACL IRR 1000ML BTL

## (undated) DEVICE — DRAPE UNDERBUTTOCKS PCH STRL

## (undated) DEVICE — BENZOIN TINCTURE 0.66ML

## (undated) DEVICE — TRAY CATH FOL SIL URIMTR 16FR

## (undated) DEVICE — HANDLE DEVON RIGID OR LIGHT

## (undated) DEVICE — GLOVE PROTEXIS HYDROGEL SZ6.5

## (undated) DEVICE — GLOVE PROTEXIS HYDROGEL SZ7.5

## (undated) DEVICE — GLOVE PROTEXIS BLUE LATEX 8

## (undated) DEVICE — SPONGE LAP STRL 18X18IN

## (undated) DEVICE — BANDAGE ACE ELASTIC 6"

## (undated) DEVICE — SPONGE GAUZE 16PLY 4X4

## (undated) DEVICE — GLOVE SURG BIOGEL LATEX SZ 7.5

## (undated) DEVICE — GLOVE PROTEXIS HYDROGEL SZ7